# Patient Record
Sex: FEMALE | Race: WHITE | NOT HISPANIC OR LATINO | URBAN - METROPOLITAN AREA
[De-identification: names, ages, dates, MRNs, and addresses within clinical notes are randomized per-mention and may not be internally consistent; named-entity substitution may affect disease eponyms.]

---

## 2017-12-12 ENCOUNTER — TELEPHONE (OUTPATIENT)
Dept: ENDOSCOPY | Facility: HOSPITAL | Age: 72
End: 2017-12-12

## 2017-12-12 ENCOUNTER — TELEPHONE (OUTPATIENT)
Dept: GASTROENTEROLOGY | Facility: CLINIC | Age: 72
End: 2017-12-12

## 2017-12-12 DIAGNOSIS — R93.3 ABNORMAL FINDINGS ON DIAGNOSTIC IMAGING OF DIGESTIVE SYSTEM: Primary | ICD-10-CM

## 2017-12-12 RX ORDER — ALPRAZOLAM 0.5 MG/1
0.5 TABLET ORAL 3 TIMES DAILY PRN
COMMUNITY

## 2017-12-12 RX ORDER — FUROSEMIDE 20 MG/1
20 TABLET ORAL
COMMUNITY

## 2017-12-12 RX ORDER — HYDRALAZINE HYDROCHLORIDE 10 MG/1
10 TABLET, FILM COATED ORAL 3 TIMES DAILY
COMMUNITY

## 2017-12-12 RX ORDER — VERAPAMIL HYDROCHLORIDE 80 MG/1
80 TABLET ORAL DAILY
COMMUNITY

## 2017-12-12 RX ORDER — ASPIRIN 81 MG/1
81 TABLET ORAL DAILY
COMMUNITY

## 2017-12-12 RX ORDER — FLECAINIDE ACETATE 50 MG/1
50 TABLET ORAL 2 TIMES DAILY
COMMUNITY

## 2017-12-12 RX ORDER — CYCLOBENZAPRINE HCL 10 MG
10 TABLET ORAL 3 TIMES DAILY PRN
COMMUNITY

## 2017-12-12 RX ORDER — ESTRADIOL 0.5 MG/1
0.5 TABLET ORAL DAILY
COMMUNITY

## 2017-12-12 RX ORDER — IRBESARTAN 300 MG/1
150 TABLET ORAL 2 TIMES DAILY
COMMUNITY

## 2017-12-12 RX ORDER — MAGNESIUM 250 MG
1 TABLET ORAL 2 TIMES DAILY
COMMUNITY

## 2017-12-12 RX ORDER — OMEPRAZOLE 40 MG/1
40 CAPSULE, DELAYED RELEASE ORAL DAILY
COMMUNITY

## 2017-12-12 NOTE — TELEPHONE ENCOUNTER
Spoke with patient. EUS/ERCP scheduled for 12/14 for 11a. Reviewed prep instructions. Ms Lees verbalized understanding.

## 2017-12-12 NOTE — TELEPHONE ENCOUNTER
Spoke with patient about instructions for UEUS/ERCP scheduled 12/14/17 at 1100.  Instructions emailed.

## 2017-12-14 ENCOUNTER — ANESTHESIA EVENT (OUTPATIENT)
Dept: ENDOSCOPY | Facility: HOSPITAL | Age: 72
End: 2017-12-14
Payer: MEDICARE

## 2017-12-14 ENCOUNTER — HOSPITAL ENCOUNTER (OUTPATIENT)
Facility: HOSPITAL | Age: 72
Discharge: HOME | End: 2017-12-14
Attending: INTERNAL MEDICINE | Admitting: INTERNAL MEDICINE
Payer: MEDICARE

## 2017-12-14 ENCOUNTER — SURGERY (OUTPATIENT)
Age: 72
End: 2017-12-14

## 2017-12-14 ENCOUNTER — ANESTHESIA (OUTPATIENT)
Dept: ENDOSCOPY | Facility: HOSPITAL | Age: 72
End: 2017-12-14
Payer: MEDICARE

## 2017-12-14 VITALS
HEART RATE: 58 BPM | SYSTOLIC BLOOD PRESSURE: 155 MMHG | RESPIRATION RATE: 16 BRPM | TEMPERATURE: 98 F | BODY MASS INDEX: 29.06 KG/M2 | DIASTOLIC BLOOD PRESSURE: 60 MMHG | HEIGHT: 63 IN | OXYGEN SATURATION: 100 % | WEIGHT: 164 LBS

## 2017-12-14 DIAGNOSIS — K83.8 AMPULLA OF VATER MASS: Primary | ICD-10-CM

## 2017-12-14 PROCEDURE — 74328 X-RAY BILE DUCT ENDOSCOPY: CPT | Mod: 26,,, | Performed by: INTERNAL MEDICINE

## 2017-12-14 PROCEDURE — 27201012 HC FORCEPS, HOT/COLD, DISP: Performed by: INTERNAL MEDICINE

## 2017-12-14 PROCEDURE — 27202125 HC BALLOON, EXTRACTION (ANY): Performed by: INTERNAL MEDICINE

## 2017-12-14 PROCEDURE — D9220A PRA ANESTHESIA: Mod: CRNA,,, | Performed by: NURSE ANESTHETIST, CERTIFIED REGISTERED

## 2017-12-14 PROCEDURE — 43259 EGD US EXAM DUODENUM/JEJUNUM: CPT | Performed by: INTERNAL MEDICINE

## 2017-12-14 PROCEDURE — 37000008 HC ANESTHESIA 1ST 15 MINUTES: Performed by: INTERNAL MEDICINE

## 2017-12-14 PROCEDURE — 27201674 HC SPHINCTERTOME: Performed by: INTERNAL MEDICINE

## 2017-12-14 PROCEDURE — 27202304 HC CANNULA, ERCP: Performed by: INTERNAL MEDICINE

## 2017-12-14 PROCEDURE — 25000003 PHARM REV CODE 250: Performed by: NURSE ANESTHETIST, CERTIFIED REGISTERED

## 2017-12-14 PROCEDURE — 37000009 HC ANESTHESIA EA ADD 15 MINS: Performed by: INTERNAL MEDICINE

## 2017-12-14 PROCEDURE — 25000003 PHARM REV CODE 250: Performed by: INTERNAL MEDICINE

## 2017-12-14 PROCEDURE — C1769 GUIDE WIRE: HCPCS | Performed by: INTERNAL MEDICINE

## 2017-12-14 PROCEDURE — D9220A PRA ANESTHESIA: Mod: ANES,,, | Performed by: ANESTHESIOLOGY

## 2017-12-14 PROCEDURE — 43274 ERCP DUCT STENT PLACEMENT: CPT | Mod: ,,, | Performed by: INTERNAL MEDICINE

## 2017-12-14 PROCEDURE — 43274 ERCP DUCT STENT PLACEMENT: CPT | Performed by: INTERNAL MEDICINE

## 2017-12-14 PROCEDURE — 63600175 PHARM REV CODE 636 W HCPCS

## 2017-12-14 PROCEDURE — 63600175 PHARM REV CODE 636 W HCPCS: Performed by: NURSE ANESTHETIST, CERTIFIED REGISTERED

## 2017-12-14 PROCEDURE — 74328 X-RAY BILE DUCT ENDOSCOPY: CPT | Performed by: INTERNAL MEDICINE

## 2017-12-14 PROCEDURE — 43259 EGD US EXAM DUODENUM/JEJUNUM: CPT | Mod: ,,, | Performed by: INTERNAL MEDICINE

## 2017-12-14 PROCEDURE — 88305 TISSUE EXAM BY PATHOLOGIST: CPT | Performed by: PATHOLOGY

## 2017-12-14 PROCEDURE — 43261 ENDO CHOLANGIOPANCREATOGRAPH: CPT | Mod: 51,,, | Performed by: INTERNAL MEDICINE

## 2017-12-14 PROCEDURE — 43261 ENDO CHOLANGIOPANCREATOGRAPH: CPT | Performed by: INTERNAL MEDICINE

## 2017-12-14 RX ORDER — SODIUM CHLORIDE 9 MG/ML
INJECTION, SOLUTION INTRAVENOUS CONTINUOUS
Status: DISCONTINUED | OUTPATIENT
Start: 2017-12-14 | End: 2017-12-14 | Stop reason: HOSPADM

## 2017-12-14 RX ORDER — KETAMINE HCL IN 0.9 % NACL 50 MG/5 ML
SYRINGE (ML) INTRAVENOUS
Status: DISCONTINUED | OUTPATIENT
Start: 2017-12-14 | End: 2017-12-14

## 2017-12-14 RX ORDER — PROPOFOL 10 MG/ML
VIAL (ML) INTRAVENOUS CONTINUOUS PRN
Status: DISCONTINUED | OUTPATIENT
Start: 2017-12-14 | End: 2017-12-14

## 2017-12-14 RX ORDER — HYDRALAZINE HYDROCHLORIDE 20 MG/ML
INJECTION INTRAMUSCULAR; INTRAVENOUS
Status: COMPLETED
Start: 2017-12-14 | End: 2017-12-14

## 2017-12-14 RX ORDER — LIDOCAINE HCL/PF 100 MG/5ML
SYRINGE (ML) INTRAVENOUS
Status: DISCONTINUED | OUTPATIENT
Start: 2017-12-14 | End: 2017-12-14

## 2017-12-14 RX ORDER — SODIUM CHLORIDE 0.9 % (FLUSH) 0.9 %
3 SYRINGE (ML) INJECTION
Status: DISCONTINUED | OUTPATIENT
Start: 2017-12-14 | End: 2017-12-14 | Stop reason: HOSPADM

## 2017-12-14 RX ORDER — HYDRALAZINE HYDROCHLORIDE 20 MG/ML
10 INJECTION INTRAMUSCULAR; INTRAVENOUS ONCE
Status: COMPLETED | OUTPATIENT
Start: 2017-12-14 | End: 2017-12-14

## 2017-12-14 RX ORDER — INDOMETHACIN 50 MG/1
SUPPOSITORY RECTAL
Status: COMPLETED | OUTPATIENT
Start: 2017-12-14 | End: 2017-12-14

## 2017-12-14 RX ORDER — PROPOFOL 10 MG/ML
VIAL (ML) INTRAVENOUS
Status: DISCONTINUED | OUTPATIENT
Start: 2017-12-14 | End: 2017-12-14

## 2017-12-14 RX ADMIN — HYDRALAZINE HYDROCHLORIDE 10 MG: 20 INJECTION INTRAMUSCULAR; INTRAVENOUS at 02:12

## 2017-12-14 RX ADMIN — INDOMETHACIN 100 MG: 50 SUPPOSITORY RECTAL at 01:12

## 2017-12-14 RX ADMIN — PROPOFOL 200 MCG/KG/MIN: 10 INJECTION, EMULSION INTRAVENOUS at 12:12

## 2017-12-14 RX ADMIN — Medication 30 MG: at 12:12

## 2017-12-14 RX ADMIN — PROPOFOL 100 MG: 10 INJECTION, EMULSION INTRAVENOUS at 12:12

## 2017-12-14 RX ADMIN — SODIUM CHLORIDE: 0.9 INJECTION, SOLUTION INTRAVENOUS at 11:12

## 2017-12-14 RX ADMIN — PROPOFOL 300 MCG/KG/MIN: 10 INJECTION, EMULSION INTRAVENOUS at 12:12

## 2017-12-14 RX ADMIN — LIDOCAINE HYDROCHLORIDE 100 MG: 20 INJECTION, SOLUTION INTRAVENOUS at 12:12

## 2017-12-14 RX ADMIN — Medication 20 MG: at 12:12

## 2017-12-14 NOTE — ANESTHESIA PREPROCEDURE EVALUATION
12/14/2017  Vonnie Lees is a 72 y.o., female.    Anesthesia Evaluation    I have reviewed the Patient Summary Reports.     I have reviewed the Medications.     Review of Systems  Anesthesia Hx:  History of prior surgery of interest to airway management or planning:  Denies Personal Hx of Anesthesia complications.   Social:  Non-Smoker, No Alcohol Use    Hematology/Oncology:  Hematology Normal   Oncology Normal     EENT/Dental:EENT/Dental Normal   Cardiovascular:   Hypertension Dysrhythmias (controlled bigeminy)    Pulmonary:  Pulmonary Normal    Renal/:  Renal/ Normal     Hepatic/GI:   Biliary mass - occasional aftn nausea with meals, denies vomiting/fullness   Musculoskeletal:  Musculoskeletal Normal    Neurological:   TIA,    Dermatological:  Skin Normal    Psych:   anxiety          Physical Exam  General:  Well nourished    Airway/Jaw/Neck:  Airway Findings: Mouth Opening: Normal Tongue: Normal  General Airway Assessment: Adult, Good  Mallampati: III  TM Distance: Normal, at least 6 cm     Eyes/Ears/Nose:  EYES/EARS/NOSE FINDINGS: Normal   Dental:  Dental Findings: In tact   Chest/Lungs:  Chest/Lungs Findings: Clear to auscultation, Normal Respiratory Rate     Heart/Vascular:  Heart Findings: Rate: Normal  Rhythm: Regular Rhythm  Sounds: Normal  Heart murmur: negative       Mental Status:  Mental Status Findings:  Cooperative, Alert and Oriented         Anesthesia Plan  Type of Anesthesia, risks & benefits discussed:  Anesthesia Type:  general  Patient's Preference:   Intra-op Monitoring Plan:   Intra-op Monitoring Plan Comments:   Post Op Pain Control Plan:   Post Op Pain Control Plan Comments:   Induction:   IV  Beta Blocker:  Patient is not currently on a Beta-Blocker (No further documentation required).       Informed Consent: Patient understands risks and agrees with Anesthesia plan.   Questions answered. Anesthesia consent signed with patient.  ASA Score: 2     Day of Surgery Review of History & Physical:    H&P update referred to the provider.     Anesthesia Plan Notes:   72F HTN, anxiety, biliary mass for EUS/ERCP/poss stent under GA NC TIVA        Ready For Surgery From Anesthesia Perspective.

## 2017-12-14 NOTE — TRANSFER OF CARE
"Anesthesia Transfer of Care Note    Patient: Vonnie Lees    Procedure(s) Performed: Procedure(s) (LRB):  ULTRASOUND-ENDOSCOPIC-UPPER (N/A)  ERCP (N/A)    Patient location: PACU    Anesthesia Type: general    Transport from OR: Transported from OR on 2-3 L/min O2 by NC with adequate spontaneous ventilation    Post pain: adequate analgesia    Post assessment: no apparent anesthetic complications    Post vital signs: stable    Level of consciousness: sedated    Nausea/Vomiting: no nausea/vomiting    Complications: none    Transfer of care protocol was followed      Last vitals:   Visit Vitals  BP (!) 108/51 (BP Location: Left arm, Patient Position: Lying)   Pulse 61   Temp 36.4 °C (97.5 °F) (Temporal)   Resp 15   Ht 5' 3" (1.6 m)   Wt 74.4 kg (164 lb)   SpO2 97%   Breastfeeding? No   BMI 29.05 kg/m²     "

## 2017-12-14 NOTE — H&P
Short Stay Endoscopy History and Physical    PCP - Brenton Castro MD  Referring Physician - Freddy Turcios MD  [address] [place] [address]    Procedure - EUS/ERCP  ASA - per anesthesia  Mallampati - per anesthesia  History of Anesthesia problems - no  Family history Anesthesia problems -  no   Plan of anesthesia - General    HPI:  This is a 72 y.o. female here for evaluation of: ampullary mass    Reflux - no  Dysphagia - no  Abdominal pain - no  Diarrhea - no    ROS:  Constitutional: No fevers, chills, No weight loss  CV: No chest pain  Pulm: No cough, No shortness of breath  Ophtho: No vision changes  GI: see HPI  Derm: No rash    Medical History:  has a past medical history of Anxiety; Arrhythmia; Biliary obstruction; Elevated liver enzymes; Endometriosis; GERD (gastroesophageal reflux disease); Hepatitis; HTN (hypertension); IBS (irritable bowel syndrome); and TIA (transient ischemic attack).    Surgical History:  has a past surgical history that includes Colonoscopy; Cholecystectomy; Hysterectomy; Tonsillectomy; and Cataract extraction, extracapsular w/ intraocular lens implant (Bilateral).    Family History: family history is not on file..    Social History:  reports that she has never smoked. She has never used smokeless tobacco. She reports that she does not drink alcohol or use drugs.    Review of patient's allergies indicates:   Allergen Reactions    Aldoril d30 [methyldopa-hydrochlorothiazide] Other (See Comments)     Itching eyeball.    Flagyl [metronidazole hcl] Other (See Comments)     Yeast infection.    Hydrochlorothiazide Other (See Comments)     Itching eyeball.    Keflex [cephalexin] Diarrhea    Zithromax [azithromycin] Diarrhea       Medications:   Prescriptions Prior to Admission   Medication Sig Dispense Refill Last Dose    flecainide (TAMBOCOR) 50 MG Tab Take 50 mg by mouth 2 (two) times daily.    12/14/2017 at Unknown time    furosemide (LASIX) 20 MG tablet Take  20 mg by mouth every other day.   Past Month at Unknown time    hydrALAZINE (APRESOLINE) 10 MG tablet Take 10 mg by mouth 3 (three) times daily.   12/14/2017 at Unknown time    irbesartan (AVAPRO) 300 MG tablet Take 150 mg by mouth 2 (two) times daily.    12/13/2017 at Unknown time    magnesium 250 mg Tab Take 1 tablet by mouth 2 (two) times daily.    Past Week at Unknown time    omeprazole (PRILOSEC) 40 MG capsule Take 40 mg by mouth once daily.   Past Week at Unknown time    verapamil (CALAN) 80 MG tablet Take 80 mg by mouth once daily.   12/13/2017 at Unknown time    ALPRAZolam (XANAX) 0.5 MG tablet Take 0.5 mg by mouth 3 (three) times daily as needed for Anxiety.   More than a month at Unknown time    aspirin (ECOTRIN) 81 MG EC tablet Take 81 mg by mouth once daily.   More than a month at Unknown time    cyclobenzaprine (FLEXERIL) 10 MG tablet Take 10 mg by mouth 3 (three) times daily as needed for Muscle spasms.   More than a month at Unknown time    estradiol (ESTRACE) 0.5 MG tablet Take 0.5 mg by mouth once daily.   More than a month at Unknown time       Physical Exam:    Vital Signs:   Vitals:    12/14/17 1113   BP: (!) 168/71   Pulse: 68   Resp: 16   Temp: 98.2 °F (36.8 °C)       General Appearance: Well appearing in no acute distress  Eyes:    No scleral icterus  ENT: Neck supple  Lungs: CTA anteriorly  Heart:  Regular rate  Abdomen: Soft, non tender, non distended with normal bowel sounds.  Extremities: No edema  Skin: No rash    Labs:  No results found for: WBC, HGB, HCT, PLT, CHOL, TRIG, HDL, LDLDIRECT, ALT, AST, NA, K, CL, CREATININE, BUN, CO2, TSH, PSA, INR, GLUF, HGBA1C, MICROALBUR    I have explained the risks and benefits of this endoscopic procedure to the patient including but not limited to bleeding, inflammation, infection, perforation, and death.      Tesfaye Barraza MD

## 2017-12-14 NOTE — PROVATION PATIENT INSTRUCTIONS
Discharge Summary/Instructions after an Endoscopic Procedure  Patient Name: Vonnie Lees  Patient MRN: [id number]  Patient YOB: 1945 Thursday, December 14, 2017  Tesfaye Barraza MD  RESTRICTIONS:  During your procedure today, you received medications for sedation.  These   medications may affect your judgment, balance and coordination.  Therefore,   for 24 hours, you have the following restrictions:   - DO NOT drive a car, operate machinery, make legal/financial decisions,   sign important papers or drink alcohol.    ACTIVITY:  The following day: return to full activity including work, except no heavy   lifting, straining or running for 3 days if polyps were removed.  DIET:  Eat and drink normally unless instructed otherwise.     TREATMENT FOR COMMON SIDE EFFECTS:  - Mild abdominal pain, belching, bloating or excessive gas: rest, eat   lightly and use a heating pad.  - Sore Throat: treat with throat lozenges and/or gargle with warm salt   water.  SYMPTOMS TO WATCH FOR AND REPORT TO YOUR PHYSICIAN:  1. Abdominal pain or bloating, other than gas cramps.  2. Chest pain.  3. Back pain.  4. Chills or fever occurring within 24 hours after the procedure.  5. Rectal bleeding, which would show as bright red, maroon, or black stools.   (A tablespoon of blood from the rectum is not serious, especially if   hemorrhoids are present.)  6. Vomiting.  7. Weakness or dizziness.  8. Because air was used during the procedure, expelling large amounts of air   from your rectum or belching is normal.  9. If a bowel prep was taken, you may not have a bowel movement for 1-3   days.  This is normal.  GO DIRECTLY TO THE NEAREST EMERGENCY ROOM IF YOU HAVE ANY OF THE FOLLOWING:      Difficulty breathing  Chills and/or fever over 101 F   Persistent vomiting and/or vomiting blood   Severe abdominal pain   Severe chest pain   Black, tarry stools   Bleeding- more than one tablespoon   Any other symptom or condition that you may feel  needs urgent attention  Your doctor recommends these additional instructions:  If any biopsies were taken, your doctor s clinic will contact you in 1 to 2   weeks with any results.  You are being discharged to home.   Your physician has recommended an ERCP today.  For questions, problems or results please call your physician - Tesfaye Barraza MD at Work:  [phone].  [facility], EMERGENCY ROOM PHONE NUMBER: [phone]  IF A COMPLICATION OR EMERGENCY SITUATION ARISES AND YOU ARE UNABLE TO REACH   YOUR PHYSICIAN - GO DIRECTLY TO THE EMERGENCY ROOM.  Tesfaye Barraza MD  12/14/2017 1:50:24 PM  This report has been verified and signed electronically.

## 2017-12-14 NOTE — PROVATION PATIENT INSTRUCTIONS
Discharge Summary/Instructions after an Endoscopic Procedure  Patient Name: Vonnie Lees  Patient MRN: [id number]  Patient YOB: 1945 Thursday, December 14, 2017  Tesfaye Barraza MD  RESTRICTIONS:  During your procedure today, you received medications for sedation.  These   medications may affect your judgment, balance and coordination.  Therefore,   for 24 hours, you have the following restrictions:   - DO NOT drive a car, operate machinery, make legal/financial decisions,   sign important papers or drink alcohol.    ACTIVITY:  The following day: return to full activity including work, except no heavy   lifting, straining or running for 3 days if polyps were removed.  DIET:  Eat and drink normally unless instructed otherwise.     TREATMENT FOR COMMON SIDE EFFECTS:  - Mild abdominal pain, belching, bloating or excessive gas: rest, eat   lightly and use a heating pad.  - Sore Throat: treat with throat lozenges and/or gargle with warm salt   water.  SYMPTOMS TO WATCH FOR AND REPORT TO YOUR PHYSICIAN:  1. Abdominal pain or bloating, other than gas cramps.  2. Chest pain.  3. Back pain.  4. Chills or fever occurring within 24 hours after the procedure.  5. Rectal bleeding, which would show as bright red, maroon, or black stools.   (A tablespoon of blood from the rectum is not serious, especially if   hemorrhoids are present.)  6. Vomiting.  7. Weakness or dizziness.  8. Because air was used during the procedure, expelling large amounts of air   from your rectum or belching is normal.  9. If a bowel prep was taken, you may not have a bowel movement for 1-3   days.  This is normal.  GO DIRECTLY TO THE NEAREST EMERGENCY ROOM IF YOU HAVE ANY OF THE FOLLOWING:      Difficulty breathing  Chills and/or fever over 101 F   Persistent vomiting and/or vomiting blood   Severe abdominal pain   Severe chest pain   Black, tarry stools   Bleeding- more than one tablespoon   Any other symptom or condition that you may feel  needs urgent attention  Your doctor recommends these additional instructions:  If any biopsies were taken, your doctor s clinic will contact you in 1 to 2   weeks with any results.  You are being discharged to home.   We are waiting for pathology results.   An appointment has been made (or make an appointment) to see a surgeon at   appointment to be scheduled.   Return to your primary care physician as previously scheduled.  For questions, problems or results please call your physician - Tesfaye Barraza MD at Work:  [phone].  [facility], EMERGENCY ROOM PHONE NUMBER: [phone]  IF A COMPLICATION OR EMERGENCY SITUATION ARISES AND YOU ARE UNABLE TO REACH   YOUR PHYSICIAN - GO DIRECTLY TO THE EMERGENCY ROOM.  Tesfaye Barraza MD  12/14/2017 1:58:52 PM  This report has been verified and signed electronically.

## 2017-12-14 NOTE — PLAN OF CARE
Pt is AAOx4. VSS. NAD. IV discontinued. Discharge instructions given, verbalized  Understanding. Discharged home with family.

## 2017-12-15 NOTE — ANESTHESIA POSTPROCEDURE EVALUATION
"Anesthesia Post Evaluation    Patient: Vonnie Lees    Procedure(s) Performed: Procedure(s) (LRB):  ULTRASOUND-ENDOSCOPIC-UPPER (N/A)  ERCP (N/A)    Final Anesthesia Type: general  Patient location during evaluation: PACU  Patient participation: Yes- Able to Participate  Level of consciousness: awake and alert  Pain management: adequate  Airway patency: patent  PONV status at discharge: No PONV  Anesthetic complications: no      Cardiovascular status: blood pressure returned to baseline  Respiratory status: unassisted, spontaneous ventilation and room air  Hydration status: euvolemic  Follow-up not needed.        Visit Vitals  BP (!) 155/60   Pulse (!) 58   Temp 36.7 °C (98.1 °F) (Temporal)   Resp 16   Ht 5' 3" (1.6 m)   Wt 74.4 kg (164 lb)   SpO2 100%   Breastfeeding? No   BMI 29.05 kg/m²       Pain/Gilda Score: Pain Assessment Performed: Yes (12/14/2017  3:05 PM)  Presence of Pain: denies (12/14/2017  3:05 PM)  Gilda Score: 10 (12/14/2017  3:05 PM)  Modified Gilda Score: 19 (12/14/2017  3:05 PM)      "

## 2017-12-19 ENCOUNTER — TELEPHONE (OUTPATIENT)
Dept: SURGERY | Facility: CLINIC | Age: 72
End: 2017-12-19

## 2017-12-20 ENCOUNTER — TELEPHONE (OUTPATIENT)
Dept: SURGERY | Facility: CLINIC | Age: 72
End: 2017-12-20

## 2017-12-20 DIAGNOSIS — C80.1 ADENOCARCINOMA: Primary | ICD-10-CM

## 2017-12-20 NOTE — TELEPHONE ENCOUNTER
----- Message from Shelia Vivas RN sent at 12/20/2017  1:53 PM CST -----  Contact: self       ----- Message -----  From: Ilene Rod  Sent: 12/20/2017   1:30 PM  To: Eddi [facility] Staff    Vonnie States that she needs a call returned by a nurse in reference to needing an consultation, Please call Vonnie @ [phone] . Thanks :)

## 2017-12-22 ENCOUNTER — HOSPITAL ENCOUNTER (OUTPATIENT)
Dept: RADIOLOGY | Facility: HOSPITAL | Age: 72
Discharge: HOME | End: 2017-12-22
Attending: SURGERY
Payer: MEDICARE

## 2017-12-22 ENCOUNTER — INITIAL CONSULT (OUTPATIENT)
Dept: SURGERY | Facility: CLINIC | Age: 72
End: 2017-12-22
Payer: MEDICARE

## 2017-12-22 VITALS
BODY MASS INDEX: 28.66 KG/M2 | DIASTOLIC BLOOD PRESSURE: 78 MMHG | SYSTOLIC BLOOD PRESSURE: 166 MMHG | TEMPERATURE: 97 F | HEART RATE: 68 BPM | RESPIRATION RATE: 18 BRPM | WEIGHT: 161.81 LBS

## 2017-12-22 DIAGNOSIS — R63.4 WEIGHT LOSS: ICD-10-CM

## 2017-12-22 DIAGNOSIS — C80.1 ADENOCARCINOMA: ICD-10-CM

## 2017-12-22 DIAGNOSIS — K83.8 AMPULLA OF VATER MASS: Primary | ICD-10-CM

## 2017-12-22 DIAGNOSIS — E55.9 VITAMIN D INSUFFICIENCY: ICD-10-CM

## 2017-12-22 DIAGNOSIS — R73.9 HYPERGLYCEMIA: ICD-10-CM

## 2017-12-22 PROCEDURE — 99214 OFFICE O/P EST MOD 30 MIN: CPT | Mod: PBBFAC,25 | Performed by: NURSE PRACTITIONER

## 2017-12-22 PROCEDURE — 71260 CT THORAX DX C+: CPT | Mod: 26,,, | Performed by: RADIOLOGY

## 2017-12-22 PROCEDURE — 99999 PR PBB SHADOW E&M-EST. PATIENT-LVL IV: CPT | Mod: PBBFAC,,, | Performed by: NURSE PRACTITIONER

## 2017-12-22 PROCEDURE — 71260 CT THORAX DX C+: CPT | Mod: TC

## 2017-12-22 PROCEDURE — 99204 OFFICE O/P NEW MOD 45 MIN: CPT | Mod: S$PBB,,, | Performed by: NURSE PRACTITIONER

## 2017-12-22 PROCEDURE — 25500020 PHARM REV CODE 255: Performed by: SURGERY

## 2017-12-22 PROCEDURE — 74177 CT ABD & PELVIS W/CONTRAST: CPT | Mod: TC

## 2017-12-22 PROCEDURE — 74177 CT ABD & PELVIS W/CONTRAST: CPT | Mod: 26,GC,, | Performed by: RADIOLOGY

## 2017-12-22 RX ADMIN — IOHEXOL 75 ML: 350 INJECTION, SOLUTION INTRAVENOUS at 11:12

## 2017-12-22 NOTE — LETTER
December 27, 2017      Tesfaye Barraza MD  [address] [facility] [address]           [facility]  [address]  [place] [address]  Phone: [phone]          Patient: Vonnie Lees   MR Number: [id number]   YOB: 1945   Date of Visit: 12/22/2017       Dear Dr. Tesfaye Barraza:    Thank you for referring Vonnie Lees to Dr. Rangel and our team for evaluation. Attached you will find relevant portions of my assessment and plan of care. She will be undergoing a Whipple procedure on 11/15/18.     If you have questions, please do not hesitate to call me. I look forward to following Vonnie Lees along with you.    Sincerely,    Lety Muhammad, DNP, APRN, A-GNP-C  Nurse Practitioner, Program Development and Navigation,  Surgical Oncology and General Surgery,  [facility] [address] [place] [address]    Tel [phone]  Fax [phone]  Email: [email or web address]  Enclosure  CC:  No Recipients    If you would like to receive this communication electronically, please contact [email or web address] or [phone] to request more information on [employer] access.    For providers and/or their staff who would like to refer a patient to [facility], please contact us through our one-stop-shop provider referral line, [facility], at [phone].    If you feel you have received this communication in error or would no longer like to receive these types of communications, please e-mail [email or web address]

## 2017-12-26 DIAGNOSIS — C80.1 ADENOCARCINOMA: Primary | ICD-10-CM

## 2017-12-26 LAB
CREAT SERPL-MCNC: 0.8 MG/DL (ref 0.5–1.4)
SAMPLE: NORMAL

## 2017-12-29 ENCOUNTER — TELEPHONE (OUTPATIENT)
Dept: SURGERY | Facility: CLINIC | Age: 72
End: 2017-12-29

## 2018-01-10 ENCOUNTER — TELEPHONE (OUTPATIENT)
Dept: SURGERY | Facility: CLINIC | Age: 73
End: 2018-01-10

## 2018-01-11 ENCOUNTER — OFFICE VISIT (OUTPATIENT)
Dept: SURGERY | Facility: CLINIC | Age: 73
DRG: 406 | End: 2018-01-11
Payer: MEDICARE

## 2018-01-11 ENCOUNTER — ANESTHESIA EVENT (OUTPATIENT)
Dept: SURGERY | Facility: HOSPITAL | Age: 73
DRG: 406 | End: 2018-01-11
Payer: MEDICARE

## 2018-01-11 VITALS
BODY MASS INDEX: 28.35 KG/M2 | TEMPERATURE: 98 F | HEART RATE: 59 BPM | DIASTOLIC BLOOD PRESSURE: 74 MMHG | WEIGHT: 160.06 LBS | SYSTOLIC BLOOD PRESSURE: 127 MMHG

## 2018-01-11 DIAGNOSIS — C24.1 AMPULLARY CARCINOMA: Primary | ICD-10-CM

## 2018-01-11 PROCEDURE — 99999 PR PBB SHADOW E&M-EST. PATIENT-LVL III: CPT | Mod: PBBFAC,,, | Performed by: SURGERY

## 2018-01-11 PROCEDURE — 99213 OFFICE O/P EST LOW 20 MIN: CPT | Mod: PBBFAC | Performed by: SURGERY

## 2018-01-11 PROCEDURE — 99213 OFFICE O/P EST LOW 20 MIN: CPT | Mod: 57,S$PBB,, | Performed by: SURGERY

## 2018-01-11 RX ORDER — LIDOCAINE HYDROCHLORIDE 10 MG/ML
1 INJECTION, SOLUTION EPIDURAL; INFILTRATION; INTRACAUDAL; PERINEURAL ONCE
Status: CANCELLED | OUTPATIENT
Start: 2018-01-11 | End: 2018-01-11

## 2018-01-11 RX ORDER — ENOXAPARIN SODIUM 100 MG/ML
40 INJECTION SUBCUTANEOUS EVERY 24 HOURS
Status: CANCELLED | OUTPATIENT
Start: 2018-01-11

## 2018-01-11 RX ORDER — DIPHENHYDRAMINE HCL 25 MG
25 CAPSULE ORAL EVERY 6 HOURS PRN
COMMUNITY

## 2018-01-11 RX ORDER — SODIUM CHLORIDE 9 MG/ML
INJECTION, SOLUTION INTRAVENOUS CONTINUOUS
Status: CANCELLED | OUTPATIENT
Start: 2018-01-11

## 2018-01-11 NOTE — PROGRESS NOTES
History & Physical  Surgery      SUBJECTIVE:     Chief Complaint/Reason for Admission: adenocarcinoma of ampulla     History of Present Illness: Vonnie Lees is a 72 y.o. 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 Patient initially presented in late 2017 for mild RUQ pain and nausea. She also has noticed scleral icterus. She had elevated bilirubin and mildly elevated LFTs and was scheduled for abdominal US on 11/21. She was found to have intrahepatic biliary dilation and CBD dilatation. Since she has had MRI/MRCP on 12/2 revealing intra and extrahepatic biliary dilation to the level of the sphincter of oddi with no obvious masses. She underwent EUS/ERCP on 12/14 which showed dilated bile duct to 14mm, mass at the ampulla (17 mm x10 mm). Had stricture sphincterectomy and covered stent placed in the CBD. She has also had stent placed in duodenum. Since procedure patient reports decreased nausea. She no longer has scleral icterus and itching.   Denies fever, chills, fatigue, recent weight loss, chest pain, shortness of breath, vomiting, jaundice, change in bowel movements, diarrhea or constipation.  Has underwent cardiac clearance.   Had previous laparoscopic cholecystectomy and open hysterectomy with lower transverse incision.     Current Outpatient Prescriptions on File Prior to Visit   Medication Sig    ALPRAZolam (XANAX) 0.5 MG tablet Take 0.5 mg by mouth 3 (three) times daily as needed for Anxiety.    cyclobenzaprine (FLEXERIL) 10 MG tablet Take 10 mg by mouth 3 (three) times daily as needed for Muscle spasms.    flecainide (TAMBOCOR) 50 MG Tab Take 50 mg by mouth 2 (two) times daily.     furosemide (LASIX) 20 MG tablet Take 20 mg by mouth every other day.    hydrALAZINE (APRESOLINE) 10 MG tablet Take 10 mg by mouth 3 (three) times daily.    irbesartan (AVAPRO)  300 MG tablet Take 150 mg by mouth 2 (two) times daily.     lipase-protease-amylase 24,000-76,000-120,000 units (CREON) 24,000-76,000 -120,000 unit capsule Take 2 capsules by mouth 3 (three) times daily with meals. Take 1 capsule w snacks    magnesium 250 mg Tab Take 1 tablet by mouth 2 (two) times daily.     omeprazole (PRILOSEC) 40 MG capsule Take 40 mg by mouth once daily.    verapamil (CALAN) 80 MG tablet Take 80 mg by mouth once daily.    aspirin (ECOTRIN) 81 MG EC tablet Take 81 mg by mouth once daily.    estradiol (ESTRACE) 0.5 MG tablet Take 0.5 mg by mouth once daily.     No current facility-administered medications on file prior to visit.        Review of patient's allergies indicates:   Allergen Reactions    Aldoril d30 [methyldopa-hydrochlorothiazide] Other (See Comments)     Itching eyeball.    Flagyl [metronidazole hcl] Other (See Comments)     Yeast infection.    Hydrochlorothiazide Other (See Comments)     Itching eyeball.    Keflex [cephalexin] Diarrhea    Zithromax [azithromycin] Diarrhea       Past Medical History:   Diagnosis Date    Anxiety     Arrhythmia     paroxysmal venticular bigeminy    Biliary obstruction     with severe ductal dilation    Elevated liver enzymes     Endometriosis     GERD (gastroesophageal reflux disease)     Hepatitis     as a child    HTN (hypertension)     IBS (irritable bowel syndrome)     TIA (transient ischemic attack)      Past Surgical History:   Procedure Laterality Date    CATARACT EXTRACTION EXTRACAPSULAR W/ INTRAOCULAR LENS IMPLANTATION Bilateral     CHOLECYSTECTOMY      COLONOSCOPY      ERCP      HYSTERECTOMY      TONSILLECTOMY       No family history on file.  Social History   Substance Use Topics    Smoking status: Never Smoker    Smokeless tobacco: Never Used    Alcohol use No        Review of Systems  OBJECTIVE:     Vital Signs (Most Recent)  Temp: 97.5 °F (36.4 °C) (01/11/18 0954)  Pulse: (!) 59 (01/11/18 0954)  BP: 127/74  (01/11/18 3917)    Physical Exam     Diagnostic Results:    CT OF THE CHEST, ABDOMEN, AND PELVIS WITH IV CONTRAST: 12/22/18    Comparison: None.    Technique: Noncontrast CT images were initially obtained through the abdomen followed by CT images of the chest, abdomen and pelvis at 5-mm axial increments after the administration of 75 cc of Omnipaque 350 intravenous contrast material but no P.O. contrast material.  Coronal and sagittal reformatting was performed using original data.    Findings:     Soft tissues at the base of the neck are unremarkable.    There is a left-sided aortic arch with 3 branch vessels.  The thoracic aorta maintains normal course and caliber without significant atherosclerotic calcification throughout its course.    The mediastinum contains no abnormally enlarged lymph nodes or mass.      The heart is normal in size and shape and there is no pericardial effusion.      Trachea and bronchi are patent and the lungs are symmetric and well aerated.  Examination of the pulmonary parenchyma demonstrates bandlike opacity in lingula, suggestive of platelike atelectasis.  There is a calcified pulmonary granuloma in the posterior segment of the left upper lobe.  Otherwise, there is no evidence of focal consolidation or pleural effusion.    In this patient with newly diagnosed ampullary adenocarcinoma, status post duodenal stent placement, no definite enhancing soft tissue mass is seen within the duodenum.  There is evidence of pneumobilia without intrahepatic biliary ductal dilatation.  The liver is normal in size and demonstrates homogeneous enhancement.  There are punctate calcifications in the right hepatic lobe, likely related to prior granulomatous disease.  There is a subcentimeter hypodensity in hepatic segment III, too small to characterize.  The spleen is normal in size and demonstrates multiple punctate splenic granulomas.  Incidental note is made of a splenule.  The gallbladder is surgically  absent.  The stomach, pancreas and adrenal glands appear unremarkable.    The kidneys are normal in size and attenuation.  Both kidneys concentrate contrast material satisfactorily.  The ureters appear normal in course and caliber.  The urinary bladder appears unremarkable.  Patient is status post hysterectomy.    The bowel demonstrates no evidence of wall thickening, dilatation or surrounding inflammatory change.      There is no evidence of mesenteric or periaortic adenopathy on this exam.      The aorta is normal in course and caliber without significant atherosclerotic calcification throughout its course or branch vessels.      Osseous structures show no signs of fracture, lytic or blastic lesion.   Impression         In this patient with newly diagnosed ampullary adenocarcinoma, status post duodenal stent placement, no definite enhancing soft tissue mass is seen within the duodenum.    No evidence of metastatic disease in the chest, abdomen or pelvis.    Expected pneumobilia without intra-extrahepatic biliary ductal dilatation.    Subcentimeter hypodensity in the left hepatic lobe, too small to characterize.    Cholecystectomy and and hysterectomy.    Although the patient has a history of malignancy, no measurable lesions per the RECIST criteria are identified.      UPPER EUS  Findings:       Endosonographic Finding :       The esophagus, stomach and duodenum and adjacent structures were        visualized endosonographically.       Endosonographic imaging in the visualized portion of the liver        showed no abnormalities.       There was dilation in the main bile duct which measured up to 14 mm.        The outer margins were irregular. There was sonographic        evidence suggesting invasion into the bile duct (invasion was        directly visualized endosonographically).       There was no sign of significant endosonographic abnormality in the        entire pancreas.  Impression:           - There was  dilation in the entire main bile duct                         which measured up to 14 mm.                        - A mass was found in the ampulla.                        - There was no sign of significant pathology in the                         entire pancreas.                        - No specimens collected.  Recommendation:       - Discharge patient to home (ambulatory).                        - Resume previous diet; Discharge to home                         (ambulatory); Resume outpatient medications                        - Perform an ERCP today.  ERCP  Findings:       A medium-sized infiltrative mass was found at the major papilla. A        wire was passed into the biliary tree. The 3-4-5 taper-tip cannula        was passed over the guidewire and the bile duct was then deeply        cannulated. Contrast was injected. I personally interpreted the bile        duct images. Ductal flow of contrast was adequate. Image quality was        adequate. Contrast extended to the main bile duct. The lower third        of the main bile duct contained a single severe stenosis. An 8 mm        biliary sphincterotomy was made with a monofilament traction        (standard) sphincterotome using ERBE electrocautery. The        sphincterotomy oozed blood. One 10 mm by 4 cm covered metal stent        was placed into the common bile duct. Bile flowed through the stent.        The stent was in good position. The ampullary mass was biopsied with        a cold forceps for histology. The total fluoroscopy exposure time        was 1.5 minutes.  Impression:           - The major papilla appeared to have a mass.                        - A severe biliary stricture was found. The                         stricture was malignant appearing.                        - A biliary sphincterotomy was performed.                        - One covered metal stent was placed into the                         common bile duct.  Recommendation:       -  Discharge patient to home (ambulatory).                        - Resume previous diet; Discharge to home                         (ambulatory); Resume outpatient medications                        - Await path results.                        - Refer to a surgeon at appointment to be                         scheduled. Will arrange for surgical oncology                         referral.                        - Return to primary care physician as previously                         scheduled.  ASSESSMENT/PLAN:     A/P: Vonnie Lees is a 72 y.o. 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 Has been cleared by cardiology.     -OR tomorrow for Whipple   -Consented in office today for procedure and blood transfusion     Agree with note above by Dr. James, pt interviewed, examined, chart, labs, vitals, films reviewed.  I have reviewed and edited the note, the assessment/plan is my own.    Ampullary adenoca, imaging without distant disease.  Went over options (resection, palliative chemoRT) w pt and family.  They want to proceed with resection.  Questions asked/answered.    Risks and benefits of pancreaticoduodenectomy (Whipple) including death, bleeding, infection, scar, pain/numbness, margin positivity, discovery of additional disease, anastomotic leakage/fistula, damage to local structures, need for conversion to open procedure, need for additional procedures based on operative findings and imponderables were all reviewed.  She was given the opportunity to ask questions, which were all addressed.  She voiced understanding and wishes to proceed.      Jeovany Rangel MD, FACS  Surgical Oncology  [facility] [place], [place]  Office: [phone]  Fax: [phone]

## 2018-01-11 NOTE — PRE-PROCEDURE INSTRUCTIONS
Preop instructions: NPO after midnight or 8 hours prior to procedure time, shower instructions, directions, leave all valuables at home, medication instructions for PM prior & am of procedure explained. Patient stated an understanding.  Patient denies any side effects or issues with anesthesia or sedation.  Verified patient received instructions to drink 12 ounces Gatorade 2 hours before surgery time

## 2018-01-12 ENCOUNTER — ANESTHESIA (OUTPATIENT)
Dept: SURGERY | Facility: HOSPITAL | Age: 73
DRG: 406 | End: 2018-01-12
Payer: MEDICARE

## 2018-01-12 ENCOUNTER — HOSPITAL ENCOUNTER (INPATIENT)
Facility: HOSPITAL | Age: 73
LOS: 11 days | Discharge: HOME HEALTH | DRG: 406 | End: 2018-01-23
Attending: SURGERY | Admitting: SURGERY
Payer: MEDICARE

## 2018-01-12 DIAGNOSIS — C24.1 AMPULLARY CARCINOMA: ICD-10-CM

## 2018-01-12 LAB
ABO + RH BLD: NORMAL
ALBUMIN SERPL BCP-MCNC: 2.6 G/DL
ALP SERPL-CCNC: 59 U/L
ALT SERPL W/O P-5'-P-CCNC: 38 U/L
ANION GAP SERPL CALC-SCNC: 9 MMOL/L
AST SERPL-CCNC: 40 U/L
BASOPHILS # BLD AUTO: 0.03 K/UL
BASOPHILS # BLD AUTO: 0.03 K/UL
BASOPHILS NFR BLD: 0.1 %
BASOPHILS NFR BLD: 0.2 %
BILIRUB SERPL-MCNC: 0.9 MG/DL
BLD GP AB SCN CELLS X3 SERPL QL: NORMAL
BUN SERPL-MCNC: 7 MG/DL
CALCIUM SERPL-MCNC: 7.5 MG/DL
CHLORIDE SERPL-SCNC: 109 MMOL/L
CO2 SERPL-SCNC: 19 MMOL/L
CREAT SERPL-MCNC: 0.7 MG/DL
DIFFERENTIAL METHOD: ABNORMAL
DIFFERENTIAL METHOD: ABNORMAL
EOSINOPHIL # BLD AUTO: 0 K/UL
EOSINOPHIL # BLD AUTO: 0 K/UL
EOSINOPHIL NFR BLD: 0 %
EOSINOPHIL NFR BLD: 0 %
ERYTHROCYTE [DISTWIDTH] IN BLOOD BY AUTOMATED COUNT: 13.7 %
ERYTHROCYTE [DISTWIDTH] IN BLOOD BY AUTOMATED COUNT: 13.8 %
EST. GFR  (AFRICAN AMERICAN): >60 ML/MIN/1.73 M^2
EST. GFR  (NON AFRICAN AMERICAN): >60 ML/MIN/1.73 M^2
GLUCOSE SERPL-MCNC: 186 MG/DL
GRAM STN SPEC: NORMAL
GRAM STN SPEC: NORMAL
HCT VFR BLD AUTO: 27.7 %
HCT VFR BLD AUTO: 31.1 %
HGB BLD-MCNC: 10.2 G/DL
HGB BLD-MCNC: 9.4 G/DL
IMM GRANULOCYTES # BLD AUTO: 0.09 K/UL
IMM GRANULOCYTES # BLD AUTO: 0.11 K/UL
IMM GRANULOCYTES NFR BLD AUTO: 0.5 %
IMM GRANULOCYTES NFR BLD AUTO: 0.6 %
LYMPHOCYTES # BLD AUTO: 1.1 K/UL
LYMPHOCYTES # BLD AUTO: 1.1 K/UL
LYMPHOCYTES NFR BLD: 4.9 %
LYMPHOCYTES NFR BLD: 6.8 %
MAGNESIUM SERPL-MCNC: 1.9 MG/DL
MCH RBC QN AUTO: 29.3 PG
MCH RBC QN AUTO: 29.7 PG
MCHC RBC AUTO-ENTMCNC: 32.8 G/DL
MCHC RBC AUTO-ENTMCNC: 33.9 G/DL
MCV RBC AUTO: 87 FL
MCV RBC AUTO: 89 FL
MONOCYTES # BLD AUTO: 0.7 K/UL
MONOCYTES # BLD AUTO: 0.8 K/UL
MONOCYTES NFR BLD: 3.3 %
MONOCYTES NFR BLD: 5 %
NEUTROPHILS # BLD AUTO: 13.7 K/UL
NEUTROPHILS # BLD AUTO: 19.9 K/UL
NEUTROPHILS NFR BLD: 87.4 %
NEUTROPHILS NFR BLD: 91.2 %
NRBC BLD-RTO: 0 /100 WBC
NRBC BLD-RTO: 0 /100 WBC
PHOSPHATE SERPL-MCNC: 3.1 MG/DL
PLATELET # BLD AUTO: 168 K/UL
PLATELET # BLD AUTO: 229 K/UL
PMV BLD AUTO: 11.2 FL
PMV BLD AUTO: 11.4 FL
POTASSIUM SERPL-SCNC: 4 MMOL/L
PROT SERPL-MCNC: 4.2 G/DL
RBC # BLD AUTO: 3.17 M/UL
RBC # BLD AUTO: 3.48 M/UL
SODIUM SERPL-SCNC: 137 MMOL/L
WBC # BLD AUTO: 15.64 K/UL
WBC # BLD AUTO: 21.8 K/UL

## 2018-01-12 PROCEDURE — 47420 INCISION OF BILE DUCT: CPT | Mod: 51,GC,, | Performed by: SURGERY

## 2018-01-12 PROCEDURE — 86920 COMPATIBILITY TEST SPIN: CPT

## 2018-01-12 PROCEDURE — 44015 INSERT NEEDLE CATH BOWEL: CPT | Mod: GC,,, | Performed by: SURGERY

## 2018-01-12 PROCEDURE — 27201037 HC PRESSURE MONITORING SET UP

## 2018-01-12 PROCEDURE — 0FBG0ZZ EXCISION OF PANCREAS, OPEN APPROACH: ICD-10-PCS | Performed by: SURGERY

## 2018-01-12 PROCEDURE — 07BD0ZZ EXCISION OF AORTIC LYMPHATIC, OPEN APPROACH: ICD-10-PCS | Performed by: SURGERY

## 2018-01-12 PROCEDURE — 0DB90ZZ EXCISION OF DUODENUM, OPEN APPROACH: ICD-10-PCS | Performed by: SURGERY

## 2018-01-12 PROCEDURE — C1729 CATH, DRAINAGE: HCPCS | Performed by: SURGERY

## 2018-01-12 PROCEDURE — 87070 CULTURE OTHR SPECIMN AEROBIC: CPT

## 2018-01-12 PROCEDURE — 63600175 PHARM REV CODE 636 W HCPCS: Performed by: ANESTHESIOLOGY

## 2018-01-12 PROCEDURE — 84100 ASSAY OF PHOSPHORUS: CPT

## 2018-01-12 PROCEDURE — 83735 ASSAY OF MAGNESIUM: CPT

## 2018-01-12 PROCEDURE — 87075 CULTR BACTERIA EXCEPT BLOOD: CPT

## 2018-01-12 PROCEDURE — 63600175 PHARM REV CODE 636 W HCPCS: Performed by: SURGERY

## 2018-01-12 PROCEDURE — 88309 TISSUE EXAM BY PATHOLOGIST: CPT | Mod: 26,,, | Performed by: PATHOLOGY

## 2018-01-12 PROCEDURE — 25000003 PHARM REV CODE 250: Performed by: SURGERY

## 2018-01-12 PROCEDURE — 88331 PATH CONSLTJ SURG 1 BLK 1SPC: CPT | Mod: 26,,, | Performed by: PATHOLOGY

## 2018-01-12 PROCEDURE — 87205 SMEAR GRAM STAIN: CPT

## 2018-01-12 PROCEDURE — 27201423 OPTIME MED/SURG SUP & DEVICES STERILE SUPPLY: Performed by: SURGERY

## 2018-01-12 PROCEDURE — 27000221 HC OXYGEN, UP TO 24 HOURS

## 2018-01-12 PROCEDURE — 27201040 HC RC 50 FILTER

## 2018-01-12 PROCEDURE — 88305 TISSUE EXAM BY PATHOLOGIST: CPT | Performed by: PATHOLOGY

## 2018-01-12 PROCEDURE — 71000039 HC RECOVERY, EACH ADD'L HOUR: Performed by: SURGERY

## 2018-01-12 PROCEDURE — 94761 N-INVAS EAR/PLS OXIMETRY MLT: CPT

## 2018-01-12 PROCEDURE — 48150 PARTIAL REMOVAL OF PANCREAS: CPT | Mod: GC,,, | Performed by: SURGERY

## 2018-01-12 PROCEDURE — 86850 RBC ANTIBODY SCREEN: CPT

## 2018-01-12 PROCEDURE — 0DHA0UZ INSERTION OF FEEDING DEVICE INTO JEJUNUM, OPEN APPROACH: ICD-10-PCS | Performed by: SURGERY

## 2018-01-12 PROCEDURE — P9045 ALBUMIN (HUMAN), 5%, 250 ML: HCPCS | Mod: JG | Performed by: ANESTHESIOLOGY

## 2018-01-12 PROCEDURE — 94799 UNLISTED PULMONARY SVC/PX: CPT

## 2018-01-12 PROCEDURE — 0F170ZB BYPASS COMMON HEPATIC DUCT TO SMALL INTESTINE, OPEN APPROACH: ICD-10-PCS | Performed by: SURGERY

## 2018-01-12 PROCEDURE — 37000008 HC ANESTHESIA 1ST 15 MINUTES: Performed by: SURGERY

## 2018-01-12 PROCEDURE — 0F1G0ZB BYPASS PANCREAS TO SMALL INTESTINE, OPEN APPROACH: ICD-10-PCS | Performed by: SURGERY

## 2018-01-12 PROCEDURE — 87077 CULTURE AEROBIC IDENTIFY: CPT

## 2018-01-12 PROCEDURE — 20600001 HC STEP DOWN PRIVATE ROOM

## 2018-01-12 PROCEDURE — 87186 SC STD MICRODIL/AGAR DIL: CPT

## 2018-01-12 PROCEDURE — C9290 INJ, BUPIVACAINE LIPOSOME: HCPCS | Performed by: SURGERY

## 2018-01-12 PROCEDURE — D9220A PRA ANESTHESIA: Mod: CRNA,,, | Performed by: NURSE ANESTHETIST, CERTIFIED REGISTERED

## 2018-01-12 PROCEDURE — 25000003 PHARM REV CODE 250: Performed by: ANESTHESIOLOGY

## 2018-01-12 PROCEDURE — 88305 TISSUE EXAM BY PATHOLOGIST: CPT | Mod: 26,,, | Performed by: PATHOLOGY

## 2018-01-12 PROCEDURE — 85025 COMPLETE CBC W/AUTO DIFF WBC: CPT | Mod: 91

## 2018-01-12 PROCEDURE — 80053 COMPREHEN METABOLIC PANEL: CPT

## 2018-01-12 PROCEDURE — 36000709 HC OR TIME LEV III EA ADD 15 MIN: Performed by: SURGERY

## 2018-01-12 PROCEDURE — 71000033 HC RECOVERY, INTIAL HOUR: Performed by: SURGERY

## 2018-01-12 PROCEDURE — 36000708 HC OR TIME LEV III 1ST 15 MIN: Performed by: SURGERY

## 2018-01-12 PROCEDURE — 87102 FUNGUS ISOLATION CULTURE: CPT

## 2018-01-12 PROCEDURE — 0FB70ZZ EXCISION OF COMMON HEPATIC DUCT, OPEN APPROACH: ICD-10-PCS | Performed by: SURGERY

## 2018-01-12 PROCEDURE — 36620 INSERTION CATHETER ARTERY: CPT | Mod: 59,,, | Performed by: ANESTHESIOLOGY

## 2018-01-12 PROCEDURE — D9220A PRA ANESTHESIA: Mod: ANES,,, | Performed by: ANESTHESIOLOGY

## 2018-01-12 PROCEDURE — 0D160ZA BYPASS STOMACH TO JEJUNUM, OPEN APPROACH: ICD-10-PCS | Performed by: SURGERY

## 2018-01-12 PROCEDURE — 36415 COLL VENOUS BLD VENIPUNCTURE: CPT

## 2018-01-12 PROCEDURE — 37000009 HC ANESTHESIA EA ADD 15 MINS: Performed by: SURGERY

## 2018-01-12 RX ORDER — ONDANSETRON 2 MG/ML
INJECTION INTRAMUSCULAR; INTRAVENOUS
Status: DISCONTINUED | OUTPATIENT
Start: 2018-01-12 | End: 2018-01-12

## 2018-01-12 RX ORDER — IPRATROPIUM BROMIDE AND ALBUTEROL SULFATE 2.5; .5 MG/3ML; MG/3ML
3 SOLUTION RESPIRATORY (INHALATION)
Status: DISPENSED | OUTPATIENT
Start: 2018-01-13 | End: 2018-01-15

## 2018-01-12 RX ORDER — ENOXAPARIN SODIUM 100 MG/ML
40 INJECTION SUBCUTANEOUS EVERY 24 HOURS
Status: DISCONTINUED | OUTPATIENT
Start: 2018-01-12 | End: 2018-01-12

## 2018-01-12 RX ORDER — MIDAZOLAM HYDROCHLORIDE 1 MG/ML
INJECTION, SOLUTION INTRAMUSCULAR; INTRAVENOUS
Status: DISCONTINUED | OUTPATIENT
Start: 2018-01-12 | End: 2018-01-12

## 2018-01-12 RX ORDER — ALBUMIN HUMAN 50 G/1000ML
SOLUTION INTRAVENOUS CONTINUOUS PRN
Status: DISCONTINUED | OUTPATIENT
Start: 2018-01-12 | End: 2018-01-12

## 2018-01-12 RX ORDER — NALOXONE HCL 0.4 MG/ML
0.02 VIAL (ML) INJECTION
Status: DISCONTINUED | OUTPATIENT
Start: 2018-01-12 | End: 2018-01-14

## 2018-01-12 RX ORDER — PANTOPRAZOLE SODIUM 40 MG/10ML
40 INJECTION, POWDER, LYOPHILIZED, FOR SOLUTION INTRAVENOUS DAILY
Status: DISCONTINUED | OUTPATIENT
Start: 2018-01-13 | End: 2018-01-18

## 2018-01-12 RX ORDER — GLYCOPYRROLATE 0.2 MG/ML
INJECTION INTRAMUSCULAR; INTRAVENOUS
Status: DISCONTINUED | OUTPATIENT
Start: 2018-01-12 | End: 2018-01-12

## 2018-01-12 RX ORDER — FENTANYL CITRATE 50 UG/ML
25 INJECTION, SOLUTION INTRAMUSCULAR; INTRAVENOUS EVERY 5 MIN PRN
Status: DISCONTINUED | OUTPATIENT
Start: 2018-01-12 | End: 2018-01-12 | Stop reason: HOSPADM

## 2018-01-12 RX ORDER — LIDOCAINE HYDROCHLORIDE 10 MG/ML
1 INJECTION, SOLUTION EPIDURAL; INFILTRATION; INTRACAUDAL; PERINEURAL ONCE
Status: DISCONTINUED | OUTPATIENT
Start: 2018-01-12 | End: 2018-01-12

## 2018-01-12 RX ORDER — ROCURONIUM BROMIDE 10 MG/ML
INJECTION, SOLUTION INTRAVENOUS
Status: DISCONTINUED | OUTPATIENT
Start: 2018-01-12 | End: 2018-01-12

## 2018-01-12 RX ORDER — NEOSTIGMINE METHYLSULFATE 1 MG/ML
INJECTION, SOLUTION INTRAVENOUS
Status: DISCONTINUED | OUTPATIENT
Start: 2018-01-12 | End: 2018-01-12

## 2018-01-12 RX ORDER — SODIUM CHLORIDE 0.9 % (FLUSH) 0.9 %
3 SYRINGE (ML) INJECTION
Status: DISCONTINUED | OUTPATIENT
Start: 2018-01-12 | End: 2018-01-12

## 2018-01-12 RX ORDER — SODIUM CHLORIDE 9 MG/ML
INJECTION, SOLUTION INTRAVENOUS CONTINUOUS
Status: DISCONTINUED | OUTPATIENT
Start: 2018-01-12 | End: 2018-01-12

## 2018-01-12 RX ORDER — LIDOCAINE HCL/PF 100 MG/5ML
SYRINGE (ML) INTRAVENOUS
Status: DISCONTINUED | OUTPATIENT
Start: 2018-01-12 | End: 2018-01-12

## 2018-01-12 RX ORDER — ACETAMINOPHEN 10 MG/ML
INJECTION, SOLUTION INTRAVENOUS
Status: DISCONTINUED | OUTPATIENT
Start: 2018-01-12 | End: 2018-01-12

## 2018-01-12 RX ORDER — BUPIVACAINE HYDROCHLORIDE 2.5 MG/ML
INJECTION, SOLUTION EPIDURAL; INFILTRATION; INTRACAUDAL
Status: DISCONTINUED | OUTPATIENT
Start: 2018-01-12 | End: 2018-01-12 | Stop reason: HOSPADM

## 2018-01-12 RX ORDER — CIPROFLOXACIN 2 MG/ML
400 INJECTION, SOLUTION INTRAVENOUS
Status: COMPLETED | OUTPATIENT
Start: 2018-01-12 | End: 2018-01-12

## 2018-01-12 RX ORDER — SODIUM CHLORIDE 9 MG/ML
INJECTION, SOLUTION INTRAVENOUS CONTINUOUS
Status: DISCONTINUED | OUTPATIENT
Start: 2018-01-12 | End: 2018-01-14

## 2018-01-12 RX ORDER — HYDROMORPHONE HCL IN 0.9% NACL 6 MG/30 ML
PATIENT CONTROLLED ANALGESIA SYRINGE INTRAVENOUS CONTINUOUS
Status: DISCONTINUED | OUTPATIENT
Start: 2018-01-12 | End: 2018-01-14

## 2018-01-12 RX ORDER — PROPOFOL 10 MG/ML
VIAL (ML) INTRAVENOUS
Status: DISCONTINUED | OUTPATIENT
Start: 2018-01-12 | End: 2018-01-12

## 2018-01-12 RX ORDER — SODIUM CHLORIDE 9 MG/ML
INJECTION, SOLUTION INTRAVENOUS CONTINUOUS PRN
Status: DISCONTINUED | OUTPATIENT
Start: 2018-01-12 | End: 2018-01-12

## 2018-01-12 RX ORDER — PHENYLEPHRINE HYDROCHLORIDE 10 MG/ML
INJECTION INTRAVENOUS
Status: DISCONTINUED | OUTPATIENT
Start: 2018-01-12 | End: 2018-01-12

## 2018-01-12 RX ORDER — FENTANYL CITRATE 50 UG/ML
INJECTION, SOLUTION INTRAMUSCULAR; INTRAVENOUS
Status: DISCONTINUED | OUTPATIENT
Start: 2018-01-12 | End: 2018-01-12

## 2018-01-12 RX ORDER — HYDROMORPHONE HYDROCHLORIDE 2 MG/ML
0.2 INJECTION, SOLUTION INTRAMUSCULAR; INTRAVENOUS; SUBCUTANEOUS EVERY 5 MIN PRN
Status: DISCONTINUED | OUTPATIENT
Start: 2018-01-12 | End: 2018-01-12

## 2018-01-12 RX ORDER — KETAMINE HYDROCHLORIDE 100 MG/ML
INJECTION, SOLUTION INTRAMUSCULAR; INTRAVENOUS
Status: DISCONTINUED | OUTPATIENT
Start: 2018-01-12 | End: 2018-01-12

## 2018-01-12 RX ADMIN — ACETAMINOPHEN 1000 MG: 10 INJECTION, SOLUTION INTRAVENOUS at 12:01

## 2018-01-12 RX ADMIN — PHENYLEPHRINE HYDROCHLORIDE 200 MCG: 10 INJECTION INTRAVENOUS at 07:01

## 2018-01-12 RX ADMIN — ROCURONIUM BROMIDE 20 MG: 10 INJECTION, SOLUTION INTRAVENOUS at 09:01

## 2018-01-12 RX ADMIN — LIDOCAINE HYDROCHLORIDE 60 MG: 20 INJECTION, SOLUTION INTRAVENOUS at 07:01

## 2018-01-12 RX ADMIN — ROCURONIUM BROMIDE 20 MG: 10 INJECTION, SOLUTION INTRAVENOUS at 08:01

## 2018-01-12 RX ADMIN — VASOPRESSIN 2 UNITS: 20 INJECTION INTRAVENOUS at 08:01

## 2018-01-12 RX ADMIN — PROPOFOL 50 MG: 10 INJECTION, EMULSION INTRAVENOUS at 07:01

## 2018-01-12 RX ADMIN — VASOPRESSIN 0.04 UNITS/MIN: 20 INJECTION INTRAVENOUS at 08:01

## 2018-01-12 RX ADMIN — ROCURONIUM BROMIDE 10 MG: 10 INJECTION, SOLUTION INTRAVENOUS at 01:01

## 2018-01-12 RX ADMIN — SODIUM CHLORIDE, SODIUM GLUCONATE, SODIUM ACETATE, POTASSIUM CHLORIDE, MAGNESIUM CHLORIDE, SODIUM PHOSPHATE, DIBASIC, AND POTASSIUM PHOSPHATE: .53; .5; .37; .037; .03; .012; .00082 INJECTION, SOLUTION INTRAVENOUS at 10:01

## 2018-01-12 RX ADMIN — ROCURONIUM BROMIDE 10 MG: 10 INJECTION, SOLUTION INTRAVENOUS at 10:01

## 2018-01-12 RX ADMIN — SODIUM CHLORIDE, SODIUM GLUCONATE, SODIUM ACETATE, POTASSIUM CHLORIDE, MAGNESIUM CHLORIDE, SODIUM PHOSPHATE, DIBASIC, AND POTASSIUM PHOSPHATE: .53; .5; .37; .037; .03; .012; .00082 INJECTION, SOLUTION INTRAVENOUS at 07:01

## 2018-01-12 RX ADMIN — KETAMINE HYDROCHLORIDE 30 MG: 100 INJECTION, SOLUTION, CONCENTRATE INTRAMUSCULAR; INTRAVENOUS at 08:01

## 2018-01-12 RX ADMIN — ROCURONIUM BROMIDE 50 MG: 10 INJECTION, SOLUTION INTRAVENOUS at 07:01

## 2018-01-12 RX ADMIN — PHENYLEPHRINE HYDROCHLORIDE 100 MCG: 10 INJECTION INTRAVENOUS at 12:01

## 2018-01-12 RX ADMIN — ROCURONIUM BROMIDE 10 MG: 10 INJECTION, SOLUTION INTRAVENOUS at 12:01

## 2018-01-12 RX ADMIN — SODIUM CHLORIDE: 0.9 INJECTION, SOLUTION INTRAVENOUS at 03:01

## 2018-01-12 RX ADMIN — NEOSTIGMINE METHYLSULFATE 5 MG: 1 INJECTION INTRAVENOUS at 02:01

## 2018-01-12 RX ADMIN — ENOXAPARIN SODIUM 40 MG: 100 INJECTION SUBCUTANEOUS at 06:01

## 2018-01-12 RX ADMIN — KETAMINE HYDROCHLORIDE 30 MG: 100 INJECTION, SOLUTION, CONCENTRATE INTRAMUSCULAR; INTRAVENOUS at 01:01

## 2018-01-12 RX ADMIN — VANCOMYCIN 1000 MG: 1 INJECTION, SOLUTION INTRAVENOUS at 06:01

## 2018-01-12 RX ADMIN — GLYCOPYRROLATE 0.2 MG: 0.2 INJECTION, SOLUTION INTRAMUSCULAR; INTRAVENOUS at 08:01

## 2018-01-12 RX ADMIN — EPHEDRINE SULFATE 5 MG: 50 INJECTION, SOLUTION INTRAMUSCULAR; INTRAVENOUS; SUBCUTANEOUS at 01:01

## 2018-01-12 RX ADMIN — SODIUM CHLORIDE: 0.9 INJECTION, SOLUTION INTRAVENOUS at 06:01

## 2018-01-12 RX ADMIN — EPHEDRINE SULFATE 10 MG: 50 INJECTION, SOLUTION INTRAMUSCULAR; INTRAVENOUS; SUBCUTANEOUS at 07:01

## 2018-01-12 RX ADMIN — SODIUM CHLORIDE: 0.9 INJECTION, SOLUTION INTRAVENOUS at 02:01

## 2018-01-12 RX ADMIN — FENTANYL CITRATE 100 MCG: 50 INJECTION, SOLUTION INTRAMUSCULAR; INTRAVENOUS at 07:01

## 2018-01-12 RX ADMIN — Medication: at 03:01

## 2018-01-12 RX ADMIN — KETAMINE HYDROCHLORIDE 30 MG: 100 INJECTION, SOLUTION, CONCENTRATE INTRAMUSCULAR; INTRAVENOUS at 12:01

## 2018-01-12 RX ADMIN — ALBUMIN (HUMAN): 12.5 SOLUTION INTRAVENOUS at 08:01

## 2018-01-12 RX ADMIN — EPHEDRINE SULFATE 10 MG: 50 INJECTION, SOLUTION INTRAMUSCULAR; INTRAVENOUS; SUBCUTANEOUS at 08:01

## 2018-01-12 RX ADMIN — ALBUMIN (HUMAN): 12.5 SOLUTION INTRAVENOUS at 09:01

## 2018-01-12 RX ADMIN — CIPROFLOXACIN 400 MG: 2 INJECTION, SOLUTION INTRAVENOUS at 08:01

## 2018-01-12 RX ADMIN — EPHEDRINE SULFATE 10 MG: 50 INJECTION, SOLUTION INTRAMUSCULAR; INTRAVENOUS; SUBCUTANEOUS at 12:01

## 2018-01-12 RX ADMIN — EPHEDRINE SULFATE 10 MG: 50 INJECTION, SOLUTION INTRAMUSCULAR; INTRAVENOUS; SUBCUTANEOUS at 02:01

## 2018-01-12 RX ADMIN — EPHEDRINE SULFATE 10 MG: 50 INJECTION, SOLUTION INTRAMUSCULAR; INTRAVENOUS; SUBCUTANEOUS at 10:01

## 2018-01-12 RX ADMIN — FENTANYL CITRATE 50 MCG: 50 INJECTION, SOLUTION INTRAMUSCULAR; INTRAVENOUS at 02:01

## 2018-01-12 RX ADMIN — KETAMINE HYDROCHLORIDE 30 MG: 100 INJECTION, SOLUTION, CONCENTRATE INTRAMUSCULAR; INTRAVENOUS at 07:01

## 2018-01-12 RX ADMIN — SODIUM CHLORIDE, SODIUM GLUCONATE, SODIUM ACETATE, POTASSIUM CHLORIDE, MAGNESIUM CHLORIDE, SODIUM PHOSPHATE, DIBASIC, AND POTASSIUM PHOSPHATE: .53; .5; .37; .037; .03; .012; .00082 INJECTION, SOLUTION INTRAVENOUS at 01:01

## 2018-01-12 RX ADMIN — PHENYLEPHRINE HYDROCHLORIDE 100 MCG: 10 INJECTION INTRAVENOUS at 02:01

## 2018-01-12 RX ADMIN — ONDANSETRON 4 MG: 2 INJECTION INTRAMUSCULAR; INTRAVENOUS at 02:01

## 2018-01-12 RX ADMIN — ROCURONIUM BROMIDE 20 MG: 10 INJECTION, SOLUTION INTRAVENOUS at 12:01

## 2018-01-12 RX ADMIN — KETAMINE HYDROCHLORIDE 30 MG: 100 INJECTION, SOLUTION, CONCENTRATE INTRAMUSCULAR; INTRAVENOUS at 11:01

## 2018-01-12 RX ADMIN — KETAMINE HYDROCHLORIDE 30 MG: 100 INJECTION, SOLUTION, CONCENTRATE INTRAMUSCULAR; INTRAVENOUS at 10:01

## 2018-01-12 RX ADMIN — FENTANYL CITRATE 50 MCG: 50 INJECTION, SOLUTION INTRAMUSCULAR; INTRAVENOUS at 01:01

## 2018-01-12 RX ADMIN — ROCURONIUM BROMIDE 10 MG: 10 INJECTION, SOLUTION INTRAVENOUS at 11:01

## 2018-01-12 RX ADMIN — PROPOFOL 150 MG: 10 INJECTION, EMULSION INTRAVENOUS at 07:01

## 2018-01-12 RX ADMIN — MIDAZOLAM HYDROCHLORIDE 2 MG: 1 INJECTION, SOLUTION INTRAMUSCULAR; INTRAVENOUS at 06:01

## 2018-01-12 RX ADMIN — KETAMINE HYDROCHLORIDE 20 MG: 100 INJECTION, SOLUTION, CONCENTRATE INTRAMUSCULAR; INTRAVENOUS at 02:01

## 2018-01-12 RX ADMIN — PHENYLEPHRINE HYDROCHLORIDE 200 MCG: 10 INJECTION INTRAVENOUS at 03:01

## 2018-01-12 RX ADMIN — GLYCOPYRROLATE 0.6 MG: 0.2 INJECTION, SOLUTION INTRAMUSCULAR; INTRAVENOUS at 02:01

## 2018-01-12 RX ADMIN — VASOPRESSIN 1 UNITS: 20 INJECTION INTRAVENOUS at 07:01

## 2018-01-12 RX ADMIN — VASOPRESSIN 1 UNITS: 20 INJECTION INTRAVENOUS at 03:01

## 2018-01-12 RX ADMIN — SODIUM CHLORIDE, SODIUM GLUCONATE, SODIUM ACETATE, POTASSIUM CHLORIDE, MAGNESIUM CHLORIDE, SODIUM PHOSPHATE, DIBASIC, AND POTASSIUM PHOSPHATE: .53; .5; .37; .037; .03; .012; .00082 INJECTION, SOLUTION INTRAVENOUS at 09:01

## 2018-01-12 RX ADMIN — CALCIUM CHLORIDE 500 MG: 100 INJECTION, SOLUTION INTRAVENOUS at 11:01

## 2018-01-12 RX ADMIN — CALCIUM CHLORIDE 500 MG: 100 INJECTION, SOLUTION INTRAVENOUS at 10:01

## 2018-01-12 RX ADMIN — SODIUM CHLORIDE: 0.9 INJECTION, SOLUTION INTRAVENOUS at 08:01

## 2018-01-12 NOTE — ANESTHESIA PROCEDURE NOTES
Arterial    Diagnosis: Pancreatic mass    Patient location during procedure: done in OR  Procedure start time: 1/12/2018 7:21 AM  Timeout: 1/12/2018 7:20 AM  Procedure end time: 1/12/2018 7:22 AM  Staffing  Anesthesiologist: RENALDO RODRIGUEZ JR  Resident/CRNA: FRANK LUBIN  Performed: resident/CRNA   Anesthesiologist was present at the time of the procedure.  Preanesthetic Checklist  Completed: patient identified, site marked, surgical consent, pre-op evaluation, timeout performed, IV checked, risks and benefits discussed, monitors and equipment checked and anesthesia consent givenArterial  Skin Prep: chlorhexidine gluconate  Local Infiltration: none  Orientation: left  Location: radial  Catheter Size: 20 G  Catheter placement by Ultrasound guidance. Heme positive aspiration all ports.  Vessel Caliber: medium, patent, compressibility normal  Vascular Doppler:  not done  Needle advanced into vessel with real time Ultrasound guidance.  Guidewire confirmed in vessel.Insertion Attempts: 1  Assessment  Dressing: secured with tape and tegaderm  Patient: Tolerated well

## 2018-01-12 NOTE — PROGRESS NOTES
History & Physical    SUBJECTIVE:     History of Present Illness:    Ms Lees is a 72 y.o. female who is accompanied by her , son and daughter, after being referred to us by Dr. Barraza and Dr. Turcios for surgical evaluation of an ampullary adenocarcinoma. She first reports having elevated LFTS though blood work that was done Nov 10/2017. She had been losing weight unintentionally since sep 2017- a total of 13 lbs. She was then referred to have an abdominal US 11/21/17 and followed up w an MRCP, then ERCP w biopsy, EUS, CT CHEST/ABD/PELV. The results are as follows:    12/2/17 MRCP: intra and extrahepatic dilatation to the duodenal ampulla.     12/11/17 ERCP w biopsy: friable duodenal ampullary mass with significant dilatation of CBD    12/11/17 PATHO: tubulovillous adenoma with HGD and consistent w adenocarcinoma in situ.     12/14/17 EUS: There was dilation in the main bile duct which measured up to 14 mm.A hypoechoic mass was identified endosonographically in the ampulla. The mass measured 17 mm by 10 mm in maximal cross-sectional diameter. The outer margins were irregular. There was sonographic evidence suggesting invasion into the bile duct     12/20/17 CT CHEST/ABD/PELV: In this patient with newly diagnosed ampullary adenocarcinoma, status post duodenal stent placement, no definite enhancing soft tissue mass is seen within the duodenum. No evidence of metastatic disease in the chest, abdomen or pelvis.    We discussed that she has an early stage ampullary adenocarcinoma and that the recommended course of action is a Whipple procedure and possible adjuvant chemo. She is still a strong lady and has preserved much of her overall strength. We discussed preoperative PERT for the short term to improve her disgestive problems and improve fat soluble vitamin deficiencies. She is grateful that we were able to fit her in just before Christmas and we will schedule a resection at first available  opportunity.      Chief Complaint   Patient presents with    Consult     ampullary julia       Review of patient's allergies indicates:   Allergen Reactions    Aldoril d30 [methyldopa-hydrochlorothiazide]      Itching eyeball    Flagyl [metronidazole hcl] Other (See Comments)     Yeast infection.    Hydrochlorothiazide      Itching eyeball.    Keflex [cephalexin] Diarrhea    Zithromax [azithromycin] Diarrhea       Current Outpatient Prescriptions   Medication Sig Dispense Refill    ALPRAZolam (XANAX) 0.5 MG tablet Take 0.5 mg by mouth 3 (three) times daily as needed for Anxiety.      cyclobenzaprine (FLEXERIL) 10 MG tablet Take 10 mg by mouth 3 (three) times daily as needed for Muscle spasms.      flecainide (TAMBOCOR) 50 MG Tab Take 50 mg by mouth 2 (two) times daily.       furosemide (LASIX) 20 MG tablet Take 20 mg by mouth as needed.       hydrALAZINE (APRESOLINE) 10 MG tablet Take 10 mg by mouth 3 (three) times daily.      irbesartan (AVAPRO) 300 MG tablet Take 150 mg by mouth 2 (two) times daily.       magnesium 250 mg Tab Take 1 tablet by mouth 2 (two) times daily.       omeprazole (PRILOSEC) 40 MG capsule Take 40 mg by mouth once daily.      verapamil (CALAN) 80 MG tablet Take 80 mg by mouth once daily. Takes in the afternoon      aspirin (ECOTRIN) 81 MG EC tablet Take 81 mg by mouth once daily.      diphenhydrAMINE (BENADRYL) 25 mg capsule Take 25 mg by mouth every 6 (six) hours as needed for Allergies.      estradiol (ESTRACE) 0.5 MG tablet Take 0.5 mg by mouth once daily.      lipase-protease-amylase 24,000-76,000-120,000 units (CREON) 24,000-76,000 -120,000 unit capsule Take 2 capsules by mouth 3 (three) times daily with meals. Take 1 capsule w snacks 240 capsule 0     No current facility-administered medications for this visit.        Past Medical History:   Diagnosis Date    Anxiety     Arrhythmia     paroxysmal venticular bigeminy    Biliary obstruction     with severe ductal  dilation    Elevated liver enzymes     Endometriosis     GERD (gastroesophageal reflux disease)     Hepatitis     as a child    HTN (hypertension)     IBS (irritable bowel syndrome)     TIA (transient ischemic attack)      Past Surgical History:   Procedure Laterality Date    CATARACT EXTRACTION EXTRACAPSULAR W/ INTRAOCULAR LENS IMPLANTATION Bilateral     CHOLECYSTECTOMY      COLONOSCOPY      ERCP      HYSTERECTOMY      TONSILLECTOMY       Family History   Problem Relation Age of Onset    Lung cancer Father      Social History   Substance Use Topics    Smoking status: Never Smoker    Smokeless tobacco: Never Used    Alcohol use No        Review of Systems:  Review of Systems   Constitutional: Positive for fatigue and unexpected weight change.        13 lbs weight loss, unintentional weight loss since Sept.    HENT: Negative.    Eyes: Negative.    Respiratory: Negative.    Cardiovascular: Negative.    Gastrointestinal: Positive for abdominal pain, diarrhea and nausea. Negative for vomiting.        RUQ pain, occ diarrhea, has hx of irritable bowel syndrome, nausea, digestive complaints   Endocrine: Negative.    Genitourinary: Negative.    Musculoskeletal: Negative.    Skin: Positive for color change.        Scleral icterus   Neurological: Negative.    Hematological: Negative.    Psychiatric/Behavioral: Negative.        OBJECTIVE:     Vital Signs (Most Recent)  Temp: 96.8 °F (36 °C) (12/22/17 1300)  Pulse: 68 (12/22/17 1300)  Resp: 18 (12/22/17 1300)  BP: (!) 166/78 (12/22/17 1300)     73.4 kg (161 lb 13.1 oz)     Physical Exam:  Physical Exam   Constitutional: She is oriented to person, place, and time. She appears well-developed and well-nourished. She is cooperative. She does not appear ill.   Well groomed   HENT:   Head: Normocephalic and atraumatic.   Mouth/Throat: Uvula is midline and oropharynx is clear and moist.   Eyes: Conjunctivae and lids are normal. No scleral icterus.   Neck: Trachea  normal, normal range of motion, full passive range of motion without pain and phonation normal. Neck supple. No thyroid mass and no thyromegaly present.   Cardiovascular: Normal rate, regular rhythm, S1 normal, S2 normal and normal heart sounds.    Pulses:       Radial pulses are 2+ on the right side, and 2+ on the left side.   No edema   Pulmonary/Chest: Effort normal and breath sounds normal.   Abdominal: Soft. Bowel sounds are normal. There is no hepatosplenomegaly. There is tenderness in the right upper quadrant. No hernia.   Musculoskeletal:   ROM WNL, ambulates well   Lymphadenopathy:     She has no cervical adenopathy.        Right: No supraclavicular adenopathy present.        Left: No supraclavicular adenopathy present.   Neurological: She is alert and oriented to person, place, and time. She has normal strength. Coordination and gait normal.   Skin: Skin is warm, dry and intact. Capillary refill takes less than 2 seconds. No rash noted. She is not diaphoretic. No pallor.   Psychiatric: She has a normal mood and affect. Her speech is normal and behavior is normal. Judgment and thought content normal. Cognition and memory are normal.       Laboratory  Ordered and reviewed    Diagnostic Results:  CT scan image and report reviewed today    ASSESSMENT/PLAN:     IMPR: resectable ampullary julia    PLAN:    1. Labs today, review at next visit  2. Cardiac clearance  3. Plan for Whipple procedure  4. RTC for pre-op before resection  5. Note to Dr. Barraza and Dr. Turcios

## 2018-01-12 NOTE — BRIEF OP NOTE
[facility]  Surgery Department  Operative Note    SUMMARY     Date of Procedure: 1/12/2018     Procedure: Procedure(s) (LRB):  WHIPPLE (N/A)  EXPLORATION-COMMON BILE DUCT (N/A)  PLACEMENT-TUBE-JEJUNOSTOMY (N/A)     Surgeon(s) and Role:     * Jeovany Rangel MD - Primary     * Ross Olmedo MD - Resident - Assisting        Pre-Operative Diagnosis: Adenocarcinoma [C80.1]    Post-Operative Diagnosis: Post-Op Diagnosis Codes:     * Adenocarcinoma [C80.1]    Anesthesia: General/MAC      Description of the Findings of the Procedure: whipple procedure, ampulary carcinoma, nilson left in place, jejunostomy placed, joyce reconstruction      Complications: No    Estimated Blood Loss (EBL): 500 mL           Implants: * No implants in log *    Specimens:   Specimen (12h ago through future)    Start     Ordered    01/12/18 1501  Specimen to Pathology - Surgery  Once     Comments:  1) Common hepatic node - Permanent2) Whipple specimen, short stitch marks common bile duct, long stitch marks pancreatic margin, SMV green ink, retroperitoneal margin black ink - Frozen      01/12/18 1501                  Condition: Good    Disposition: PACU - hemodynamically stable.    Attestation: I was present and scrubbed for the entire procedure.

## 2018-01-12 NOTE — INTERVAL H&P NOTE
Day of surgery addendum     Current history and physical on file and completed as below.  There have been no significant clinical changes since the completion of the H&P below.  Patient identified by me and surgical site confirmed by patient and myself.    I have personally discussed the risks of surgery as detailed below, as well as the risks of anesthesia.  Vonnie Lees understands the risks, any and all questions were answered to her satisfaction.     Completed by:  Jeovany Rangel MD  on 1/12/2018 at 6:48 AM

## 2018-01-12 NOTE — H&P (VIEW-ONLY)
History & Physical  Surgery      SUBJECTIVE:     Chief Complaint/Reason for Admission: adenocarcinoma of ampulla     History of Present Illness: Vonnie Lees is a 72 y.o. 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 Patient initially presented in late 2017 for mild RUQ pain and nausea. She also has noticed scleral icterus. She had elevated bilirubin and mildly elevated LFTs and was scheduled for abdominal US on 11/21. She was found to have intrahepatic biliary dilation and CBD dilatation. Since she has had MRI/MRCP on 12/2 revealing intra and extrahepatic biliary dilation to the level of the sphincter of oddi with no obvious masses. She underwent EUS/ERCP on 12/14 which showed dilated bile duct to 14mm, mass at the ampulla (17 mm x10 mm). Had stricture sphincterectomy and covered stent placed in the CBD. She has also had stent placed in duodenum. Since procedure patient reports decreased nausea. She no longer has scleral icterus and itching.   Denies fever, chills, fatigue, recent weight loss, chest pain, shortness of breath, vomiting, jaundice, change in bowel movements, diarrhea or constipation.  Has underwent cardiac clearance.   Had previous laparoscopic cholecystectomy and open hysterectomy with lower transverse incision.     Current Outpatient Prescriptions on File Prior to Visit   Medication Sig    ALPRAZolam (XANAX) 0.5 MG tablet Take 0.5 mg by mouth 3 (three) times daily as needed for Anxiety.    cyclobenzaprine (FLEXERIL) 10 MG tablet Take 10 mg by mouth 3 (three) times daily as needed for Muscle spasms.    flecainide (TAMBOCOR) 50 MG Tab Take 50 mg by mouth 2 (two) times daily.     furosemide (LASIX) 20 MG tablet Take 20 mg by mouth every other day.    hydrALAZINE (APRESOLINE) 10 MG tablet Take 10 mg by mouth 3 (three) times daily.    irbesartan (AVAPRO)  300 MG tablet Take 150 mg by mouth 2 (two) times daily.     lipase-protease-amylase 24,000-76,000-120,000 units (CREON) 24,000-76,000 -120,000 unit capsule Take 2 capsules by mouth 3 (three) times daily with meals. Take 1 capsule w snacks    magnesium 250 mg Tab Take 1 tablet by mouth 2 (two) times daily.     omeprazole (PRILOSEC) 40 MG capsule Take 40 mg by mouth once daily.    verapamil (CALAN) 80 MG tablet Take 80 mg by mouth once daily.    aspirin (ECOTRIN) 81 MG EC tablet Take 81 mg by mouth once daily.    estradiol (ESTRACE) 0.5 MG tablet Take 0.5 mg by mouth once daily.     No current facility-administered medications on file prior to visit.        Review of patient's allergies indicates:   Allergen Reactions    Aldoril d30 [methyldopa-hydrochlorothiazide] Other (See Comments)     Itching eyeball.    Flagyl [metronidazole hcl] Other (See Comments)     Yeast infection.    Hydrochlorothiazide Other (See Comments)     Itching eyeball.    Keflex [cephalexin] Diarrhea    Zithromax [azithromycin] Diarrhea       Past Medical History:   Diagnosis Date    Anxiety     Arrhythmia     paroxysmal venticular bigeminy    Biliary obstruction     with severe ductal dilation    Elevated liver enzymes     Endometriosis     GERD (gastroesophageal reflux disease)     Hepatitis     as a child    HTN (hypertension)     IBS (irritable bowel syndrome)     TIA (transient ischemic attack)      Past Surgical History:   Procedure Laterality Date    CATARACT EXTRACTION EXTRACAPSULAR W/ INTRAOCULAR LENS IMPLANTATION Bilateral     CHOLECYSTECTOMY      COLONOSCOPY      ERCP      HYSTERECTOMY      TONSILLECTOMY       No family history on file.  Social History   Substance Use Topics    Smoking status: Never Smoker    Smokeless tobacco: Never Used    Alcohol use No        Review of Systems  OBJECTIVE:     Vital Signs (Most Recent)  Temp: 97.5 °F (36.4 °C) (01/11/18 0954)  Pulse: (!) 59 (01/11/18 0954)  BP: 127/74  (01/11/18 4177)    Physical Exam     Diagnostic Results:    CT OF THE CHEST, ABDOMEN, AND PELVIS WITH IV CONTRAST: 12/22/18    Comparison: None.    Technique: Noncontrast CT images were initially obtained through the abdomen followed by CT images of the chest, abdomen and pelvis at 5-mm axial increments after the administration of 75 cc of Omnipaque 350 intravenous contrast material but no P.O. contrast material.  Coronal and sagittal reformatting was performed using original data.    Findings:     Soft tissues at the base of the neck are unremarkable.    There is a left-sided aortic arch with 3 branch vessels.  The thoracic aorta maintains normal course and caliber without significant atherosclerotic calcification throughout its course.    The mediastinum contains no abnormally enlarged lymph nodes or mass.      The heart is normal in size and shape and there is no pericardial effusion.      Trachea and bronchi are patent and the lungs are symmetric and well aerated.  Examination of the pulmonary parenchyma demonstrates bandlike opacity in lingula, suggestive of platelike atelectasis.  There is a calcified pulmonary granuloma in the posterior segment of the left upper lobe.  Otherwise, there is no evidence of focal consolidation or pleural effusion.    In this patient with newly diagnosed ampullary adenocarcinoma, status post duodenal stent placement, no definite enhancing soft tissue mass is seen within the duodenum.  There is evidence of pneumobilia without intrahepatic biliary ductal dilatation.  The liver is normal in size and demonstrates homogeneous enhancement.  There are punctate calcifications in the right hepatic lobe, likely related to prior granulomatous disease.  There is a subcentimeter hypodensity in hepatic segment III, too small to characterize.  The spleen is normal in size and demonstrates multiple punctate splenic granulomas.  Incidental note is made of a splenule.  The gallbladder is surgically  absent.  The stomach, pancreas and adrenal glands appear unremarkable.    The kidneys are normal in size and attenuation.  Both kidneys concentrate contrast material satisfactorily.  The ureters appear normal in course and caliber.  The urinary bladder appears unremarkable.  Patient is status post hysterectomy.    The bowel demonstrates no evidence of wall thickening, dilatation or surrounding inflammatory change.      There is no evidence of mesenteric or periaortic adenopathy on this exam.      The aorta is normal in course and caliber without significant atherosclerotic calcification throughout its course or branch vessels.      Osseous structures show no signs of fracture, lytic or blastic lesion.   Impression         In this patient with newly diagnosed ampullary adenocarcinoma, status post duodenal stent placement, no definite enhancing soft tissue mass is seen within the duodenum.    No evidence of metastatic disease in the chest, abdomen or pelvis.    Expected pneumobilia without intra-extrahepatic biliary ductal dilatation.    Subcentimeter hypodensity in the left hepatic lobe, too small to characterize.    Cholecystectomy and and hysterectomy.    Although the patient has a history of malignancy, no measurable lesions per the RECIST criteria are identified.      UPPER EUS  Findings:       Endosonographic Finding :       The esophagus, stomach and duodenum and adjacent structures were        visualized endosonographically.       Endosonographic imaging in the visualized portion of the liver        showed no abnormalities.       There was dilation in the main bile duct which measured up to 14 mm.        The outer margins were irregular. There was sonographic        evidence suggesting invasion into the bile duct (invasion was        directly visualized endosonographically).       There was no sign of significant endosonographic abnormality in the        entire pancreas.  Impression:           - There was  dilation in the entire main bile duct                         which measured up to 14 mm.                        - A mass was found in the ampulla.                        - There was no sign of significant pathology in the                         entire pancreas.                        - No specimens collected.  Recommendation:       - Discharge patient to home (ambulatory).                        - Resume previous diet; Discharge to home                         (ambulatory); Resume outpatient medications                        - Perform an ERCP today.  ERCP  Findings:       A medium-sized infiltrative mass was found at the major papilla. A        wire was passed into the biliary tree. The 3-4-5 taper-tip cannula        was passed over the guidewire and the bile duct was then deeply        cannulated. Contrast was injected. I personally interpreted the bile        duct images. Ductal flow of contrast was adequate. Image quality was        adequate. Contrast extended to the main bile duct. The lower third        of the main bile duct contained a single severe stenosis. An 8 mm        biliary sphincterotomy was made with a monofilament traction        (standard) sphincterotome using ERBE electrocautery. The        sphincterotomy oozed blood. One 10 mm by 4 cm covered metal stent        was placed into the common bile duct. Bile flowed through the stent.        The stent was in good position. The ampullary mass was biopsied with        a cold forceps for histology. The total fluoroscopy exposure time        was 1.5 minutes.  Impression:           - The major papilla appeared to have a mass.                        - A severe biliary stricture was found. The                         stricture was malignant appearing.                        - A biliary sphincterotomy was performed.                        - One covered metal stent was placed into the                         common bile duct.  Recommendation:       -  Discharge patient to home (ambulatory).                        - Resume previous diet; Discharge to home                         (ambulatory); Resume outpatient medications                        - Await path results.                        - Refer to a surgeon at appointment to be                         scheduled. Will arrange for surgical oncology                         referral.                        - Return to primary care physician as previously                         scheduled.  ASSESSMENT/PLAN:     A/P: Vonnie Lees is a 72 y.o. 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 Has been cleared by cardiology.     -OR tomorrow for Whipple   -Consented in office today for procedure and blood transfusion     Agree with note above by Dr. James, pt interviewed, examined, chart, labs, vitals, films reviewed.  I have reviewed and edited the note, the assessment/plan is my own.    Ampullary adenoca, imaging without distant disease.  Went over options (resection, palliative chemoRT) w pt and family.  They want to proceed with resection.  Questions asked/answered.    Risks and benefits of pancreaticoduodenectomy (Whipple) including death, bleeding, infection, scar, pain/numbness, margin positivity, discovery of additional disease, anastomotic leakage/fistula, damage to local structures, need for conversion to open procedure, need for additional procedures based on operative findings and imponderables were all reviewed.  She was given the opportunity to ask questions, which were all addressed.  She voiced understanding and wishes to proceed.      Jeovany Rangel MD, FACS  Surgical Oncology  [facility] [place], [place]  Office: [phone]  Fax: [phone]

## 2018-01-12 NOTE — ANESTHESIA PREPROCEDURE EVALUATION
Past Medical History:   Diagnosis Date    Anxiety     Arrhythmia     paroxysmal venticular bigeminy    Biliary obstruction     with severe ductal dilation    Elevated liver enzymes     Endometriosis     GERD (gastroesophageal reflux disease)     Hepatitis     as a child    HTN (hypertension)     IBS (irritable bowel syndrome)     TIA (transient ischemic attack)      Past Surgical History:   Procedure Laterality Date    CATARACT EXTRACTION EXTRACAPSULAR W/ INTRAOCULAR LENS IMPLANTATION Bilateral     CHOLECYSTECTOMY      COLONOSCOPY      ERCP      HYSTERECTOMY      TONSILLECTOMY       Patient Active Problem List   Diagnosis    Ampulla of Vater mass    Ampullary carcinoma     Please See ROS/PMH and Active Problem List above                                                                                                             01/12/2018  Vonnie Lees is a 72 y.o., female.    Anesthesia Evaluation    I have reviewed the Patient Summary Reports.    I have reviewed the Nursing Notes.   I have reviewed the Medications.     Review of Systems  Anesthesia Hx:  No problems with previous Anesthesia  History of prior surgery of interest to airway management or planning:  Denies Personal Hx of Anesthesia complications.   Social:  Non-Smoker, No Alcohol Use    Hematology/Oncology:  Hematology Normal   Oncology Normal     EENT/Dental:EENT/Dental Normal   Cardiovascular:   Hypertension Dysrhythmias (controlled bigeminy)    Pulmonary:  Pulmonary Normal    Renal/:  Renal/ Normal     Hepatic/GI:   GERD Hepatitis Biliary mass - occasional aftn nausea with meals, denies vomiting/fullness   Musculoskeletal:  Musculoskeletal Normal    Neurological:   TIA,    Dermatological:  Skin Normal    Psych:   anxiety          Physical Exam  General:  Well nourished    Airway/Jaw/Neck:  Airway Findings: Mouth Opening: Normal General Airway Assessment: Adult  Mallampati: II  Improves to II with phonation.  TM Distance:  Normal, at least 6 cm  Jaw/Neck Findings:  Neck ROM: Normal ROM      Dental:  Dental Findings: In tact   Chest/Lungs:  Chest/Lungs Findings: Clear to auscultation, Normal Respiratory Rate     Heart/Vascular:  Heart Findings: Rate: Normal  Rhythm: Regular Rhythm        Mental Status:  Mental Status Findings:  Cooperative, Alert and Oriented         Anesthesia Plan  Type of Anesthesia, risks & benefits discussed:  Anesthesia Type:  general  Patient's Preference:   Intra-op Monitoring Plan:   Intra-op Monitoring Plan Comments:   Post Op Pain Control Plan:   Post Op Pain Control Plan Comments:   Induction:   IV  Beta Blocker:  Patient is not currently on a Beta-Blocker (No further documentation required).       Informed Consent: Patient understands risks and agrees with Anesthesia plan.  Questions answered. Anesthesia consent signed with patient.  ASA Score: 3     Day of Surgery Review of History & Physical:    H&P update referred to the surgeon.         Ready For Surgery From Anesthesia Perspective.

## 2018-01-12 NOTE — OP NOTE
[facility]  Surgery Department  Operative Note       Date of Procedure: 1/12/2018     Surgeon(s) and Role:     * Jeovany Rangel MD - Primary     * Ross Olmedo MD - Resident - Assisting      Pre-Operative Diagnosis: Ampullary Adenocarcinoma    Pre-Operative Variables:  Preoperative diagnosis: Ampullary adenoca  Going into surgery, the lesion was believed to be resectable  Chemotherapy within 90 Days: No  Radiation Therapy within 90 Days: No  Preoperative Obstructive Jaundice: Yes  Preoperative Biliary Stent: Yes, covered metal    Post-Operative Diagnosis:    1. Same    Anesthesia: General ETT    Operative Findings:   1. No evidence of distant intraperitoneal metastases   2. RO or R1 resection based on final pathology  3. Frozen sections of bile duct and pancreatic margins negative  4. Reconstruction via retrocolic joyce limb to PJ and HJ, antecolic gastro-J to proximal jejunum  5. Pancreas remnant: soft, duct: 2 mm  6. PD stent:  No  7. Bile duct:  12 mm, thick-walled, bile: clear    Procedures:  1. Pancreaticoduodenectomy (Whipple Procedure), non-pylorus preserving.  2. Open Common Bile Duct Exploration   3. Feeding Tube Jejunostomy    Estimated Blood Loss (EBL):  500 mL           Indications:  Vonnie Lees presents for the above procedures.  Risks and benefits were reviewed including bleeding, infection, pancreatic/biliary/entric leakage/fistula, damage to local structures, need for additional procedures, death, and imponderables.  She understands and gave informed consent to proceed.    Details: The patient was transported to the operating room and satisfactory anesthesia established. Preoperative antibiotics were administered. The patient was placed in the supine position and extremities were padded and protected throughout. A saini catheter was placed.      The operative field was prepped and draped in sterile fashion. A timeout was performed. A supraumbilical midline incision was  used to gain access to the peritoneal cavity. The round ligament was divided flush with the anterior abdominal wall between ties. The falciform ligament was divided up and over the liver and the round ligament/falciform tucked away for later use as a pedicled flap. The abdomen was explored. There was no evidence of hepatic, visceral, or intraperitoneal metastases. No ascites.  There were some pelvic omental adhesions that had to be released to restore normal anatomy.     The omentum was elevated and  from the transverse colon throughout its length. The lesser sac was entered. There was no evidence of involvement of the lesser sac. The right colon was mobilized medially and the hepatic flexure taken down. A wide Kocher maneuver was performed,  the duodenum from the retroperitoneum and exposing the IVC, left renal vein. The tumor did not appear to grossly involve the superior mesenteric artery. The middle colic vein was identified and traced to its junction with the right gastroepiploic vein and the gastrocolic trunk. The gastrocolic trunk was divided with silk ties, exposing the infrapancreatic SMV.      The antrum of the stomach was divided with a purple-loaded Endo JUAN stapler after ligating the gastric and gastroepiploic vessels at the stomach wall with silk. The right-sided omentum was divided at this same level and submitted with the specimen.     Attention was turned to the jony hepatis. The common hepatic artery lymph node was identified dissected circumferentially with electrocautery and submitted separately. This exposed the common hepatic artery, which was traced to the gastroduodenal artery. The right gastric artery was divided between ties. The gastroduodenal artery was test clamped, without any change in the hepatic artery pulse. The gastroduodenal artery was divided between ties with an additional suture ligature. This exposed the portal vein above the pancreas. The common bile duct  was divided. It was flushed and intrumented to remove debris. The stent was pushed into the specimen. The distal duct was oversewn. Lymph nodes in the hepatoduodenal ligament were dissected with the Ligasure, and swept onto the specimen.  A retropancreatic tunnel was created along the anterior aspect of the portal vein, joining with the previous infrapancreatic dissection.         The proximal jejunum was then divided just distal to the ligament of Treitz with a purple-loaded EndoGIA stapler. The ligament of Treitz was taken down with electrocautery. The Ligasure was used to divide the mesentery of the proximal jejunum and fourth portion of the duodenum, taking care to protect the superior mesenteric artery. The proximal jejunum was then passed under the mesenteric vessels to the patient's right side.     The SMA was identified and dissected away from the specimen from the right side.  The majority of this was done with the Ligasure, taking visible side branches to the specimen between clips or ties as appropriate. The PV/SMV was easily separable from the pancreatic head, leaving the specimen attached only by the neck.  The pancreatic neck was divided with electrocautery over the SMV/PV.     This freed the specimen, which was oriented and inked on the back table by me. Sections were obtained of the common hepatic duct and pancreatic margins. These would both return as nonmalignant.      The small bowel was examined. The defect at the ligament of Treitz was closed. The proximal jejunum was identified and divided 50 cm distal to create a Hernandez limb for the PJ & HJ. This was brought through a defect in the right transverse mesocolon. Reconstruction was performed with a retrocolic end to side, invaginating pancreaticojejunostomy, followed by an end to side hepaticojejunostomy on the same limb.  The other limb was an antecolic side to side stapled gastrojejunostomy between the posterior wall of the distal stomach and the  most proximal jejunum     The jejunal limb was brought through a mesocolic window to the right of the middle colic vessels. The pancreas was examined.  It was soft, the duct was miniscule, <2 mm.  I could barely get a 5Fr infant feeding tube into it and felt we were safest with an invaginating anastomosis.  2-0 prolene transpancreatic mattress stitches were placed between the pancreas and jejunum, forming the posterior layer.   A running 4-0 PDS sutures were used to approximate the pancreatic capsule and incorporating the duct to a small jejunotomy under loupe magnification. The previously placed transpancreatic sutures were used to fold the jejunum over the pancreas, completing the anterior layer.      About 10 cm distal to the pancreatico-jejunostomy, an end to side hepaticojejunostomy was performed with interrupted 5-0 PDS sutures, using the method of Husam and Raiza. There was no visible bile leakage.      An antecolic side to side gastrojejunostomy was performed between the posterior wall of the stomach and the most proximal jejunum, away from the distal gastric staple line using a 60 mm purple loaded Endo JUAN stapler. The anastomosis was examined from within. All limbs were patent. The staple line was secure and hemostatic. The enteroenterostomy was closed in 2 layers using 3-0 PDS suture.      Continuity was reestablished with a jejunojejunostomy between the HJ/PJ and GJ limbs using a 60 mm EndoGIA stapler. The limbs and staple line were examined from within and were in good order. The enteroenterostomy was closed in 2 layers using 3-0 PDS suture.      A feeding tube jejunostomy was brought in through the left upper quadrant, through a purse string of silk suture, into the jejunum distal to the enterostomy. A short Witzel tunnel was created and the jejunum tacked to the abdominal wall at the site of tube exit.      A 19 Irish round Raúl drain was brought in via a right upper quadrant stab incision,  placed behind hepaticojejunostomy and behind the pancreaticojejunostomy. The previously preserved omentum was placed under and around the pancreaticojejunostomy. The previously preserved round ligament and falciform were placed over the gastroduodenal artery stump, protecting and  it from the pancreatic anastomosis.    On release of the retractor we noted some bleeding from the left colon mesentery that required suture ligation.  The colon looked healthy and the spleen was fine.  The abdomen was irrigated throughout and inspected for hemostasis.       A separate sterile setup was used for closure.  Gowns and gloves were changed.  The abdomen was closed over a Homer visceral retractor with loop #1 PDS suture.  Skin was closed with monocryl.      The patient was extubated and taken to the recovery room in stable condition. All needle, lap, and sponge counts were reported as correct. I communicated the intraoperative findings with the family following the procedure.      Implants: * No implants in log *    Specimens:   Specimen (12h ago through future)    Start     Ordered    01/12/18 1501  Specimen to Pathology - Surgery  Once     Comments:  1) Common hepatic node - Permanent2) Whipple specimen, short stitch marks common bile duct, long stitch marks pancreatic margin, SMV green ink, retroperitoneal margin black ink - Frozen      01/12/18 1501                  Condition: Stable    Disposition: PACU - hemodynamically stable.    Attestation: I was present and scrubbed for the key portions of the procedure.

## 2018-01-12 NOTE — TRANSFER OF CARE
"Anesthesia Transfer of Care Note    Patient: Vonnie Lees    Procedure(s) Performed: Procedure(s) (LRB):  WHIPPLE (N/A)  EXPLORATION-COMMON BILE DUCT (N/A)  PLACEMENT-TUBE-JEJUNOSTOMY (N/A)    Patient location: PACU    Anesthesia Type: general    Transport from OR: Transported from OR on 6-10 L/min O2 by face mask with adequate spontaneous ventilation    Post pain: adequate analgesia    Post assessment: no apparent anesthetic complications and tolerated procedure well    Post vital signs: stable    Level of consciousness: awake    Nausea/Vomiting: no nausea/vomiting    Complications: none    Transfer of care protocol was followed      Last vitals:   Visit Vitals  BP (!) 149/69   Pulse 75   Temp 37 °C (98.6 °F) (Oral)   Resp 16   Ht 5' 3" (1.6 m)   Wt 73.5 kg (162 lb)   SpO2 99%   Breastfeeding? No   BMI 28.70 kg/m²     "

## 2018-01-13 LAB
ALBUMIN SERPL BCP-MCNC: 2.6 G/DL
ALP SERPL-CCNC: 56 U/L
ALT SERPL W/O P-5'-P-CCNC: 34 U/L
ANION GAP SERPL CALC-SCNC: 4 MMOL/L
AST SERPL-CCNC: 28 U/L
BACTERIA #/AREA URNS AUTO: ABNORMAL /HPF
BASOPHILS # BLD AUTO: 0.02 K/UL
BASOPHILS NFR BLD: 0.1 %
BILIRUB SERPL-MCNC: 0.5 MG/DL
BILIRUB UR QL STRIP: NEGATIVE
BUN SERPL-MCNC: 11 MG/DL
CA-I BLDV-SCNC: 1.2 MMOL/L
CALCIUM SERPL-MCNC: 7.6 MG/DL
CHLORIDE SERPL-SCNC: 114 MMOL/L
CLARITY UR REFRACT.AUTO: ABNORMAL
CO2 SERPL-SCNC: 20 MMOL/L
COLOR UR AUTO: YELLOW
CREAT SERPL-MCNC: 0.7 MG/DL
DIFFERENTIAL METHOD: ABNORMAL
EOSINOPHIL # BLD AUTO: 0 K/UL
EOSINOPHIL NFR BLD: 0 %
ERYTHROCYTE [DISTWIDTH] IN BLOOD BY AUTOMATED COUNT: 13.9 %
EST. GFR  (AFRICAN AMERICAN): >60 ML/MIN/1.73 M^2
EST. GFR  (NON AFRICAN AMERICAN): >60 ML/MIN/1.73 M^2
GLUCOSE SERPL-MCNC: 139 MG/DL (ref 70–110)
GLUCOSE SERPL-MCNC: 141 MG/DL
GLUCOSE SERPL-MCNC: 174 MG/DL (ref 70–110)
GLUCOSE SERPL-MCNC: 175 MG/DL (ref 70–110)
GLUCOSE SERPL-MCNC: 175 MG/DL (ref 70–110)
GLUCOSE UR QL STRIP: NEGATIVE
HCO3 UR-SCNC: 20.1 MMOL/L (ref 24–28)
HCO3 UR-SCNC: 21 MMOL/L (ref 24–28)
HCO3 UR-SCNC: 21.1 MMOL/L (ref 24–28)
HCO3 UR-SCNC: 21.1 MMOL/L (ref 24–28)
HCT VFR BLD AUTO: 28.7 %
HCT VFR BLD CALC: 22 %PCV (ref 36–54)
HCT VFR BLD CALC: 25 %PCV (ref 36–54)
HCT VFR BLD CALC: 25 %PCV (ref 36–54)
HCT VFR BLD CALC: 28 %PCV (ref 36–54)
HGB BLD-MCNC: 9.6 G/DL
HGB UR QL STRIP: ABNORMAL
HYALINE CASTS UR QL AUTO: 0 /LPF
IMM GRANULOCYTES # BLD AUTO: 0.09 K/UL
IMM GRANULOCYTES NFR BLD AUTO: 0.4 %
KETONES UR QL STRIP: NEGATIVE
LEUKOCYTE ESTERASE UR QL STRIP: NEGATIVE
LYMPHOCYTES # BLD AUTO: 1.6 K/UL
LYMPHOCYTES NFR BLD: 7.5 %
MAGNESIUM SERPL-MCNC: 1.7 MG/DL
MCH RBC QN AUTO: 29.2 PG
MCHC RBC AUTO-ENTMCNC: 33.4 G/DL
MCV RBC AUTO: 87 FL
MICROSCOPIC COMMENT: ABNORMAL
MONOCYTES # BLD AUTO: 1 K/UL
MONOCYTES NFR BLD: 4.8 %
NEUTROPHILS # BLD AUTO: 18.4 K/UL
NEUTROPHILS NFR BLD: 87.2 %
NITRITE UR QL STRIP: NEGATIVE
NRBC BLD-RTO: 0 /100 WBC
PCO2 BLDA: 35.5 MMHG (ref 35–45)
PCO2 BLDA: 36.8 MMHG (ref 35–45)
PCO2 BLDA: 39.8 MMHG (ref 35–45)
PCO2 BLDA: 39.8 MMHG (ref 35–45)
PH SMN: 7.33 [PH] (ref 7.35–7.45)
PH SMN: 7.33 [PH] (ref 7.35–7.45)
PH SMN: 7.34 [PH] (ref 7.35–7.45)
PH SMN: 7.38 [PH] (ref 7.35–7.45)
PH UR STRIP: 5 [PH] (ref 5–8)
PHOSPHATE SERPL-MCNC: 2.5 MG/DL
PLATELET # BLD AUTO: 158 K/UL
PMV BLD AUTO: 11.3 FL
PO2 BLDA: 180 MMHG (ref 80–100)
PO2 BLDA: 189 MMHG (ref 80–100)
PO2 BLDA: 201 MMHG (ref 80–100)
PO2 BLDA: 204 MMHG (ref 80–100)
POC BE: -4 MMOL/L
POC BE: -5 MMOL/L
POC BE: -5 MMOL/L
POC BE: -6 MMOL/L
POC IONIZED CALCIUM: 0.92 MMOL/L (ref 1.06–1.42)
POC IONIZED CALCIUM: 1.08 MMOL/L (ref 1.06–1.42)
POC IONIZED CALCIUM: 1.08 MMOL/L (ref 1.06–1.42)
POC IONIZED CALCIUM: 1.17 MMOL/L (ref 1.06–1.42)
POC SATURATED O2: 100 % (ref 95–100)
POC TCO2: 21 MMOL/L (ref 23–27)
POC TCO2: 22 MMOL/L (ref 23–27)
POTASSIUM BLD-SCNC: 3.1 MMOL/L (ref 3.5–5.1)
POTASSIUM BLD-SCNC: 3.1 MMOL/L (ref 3.5–5.1)
POTASSIUM BLD-SCNC: 3.4 MMOL/L (ref 3.5–5.1)
POTASSIUM BLD-SCNC: 3.5 MMOL/L (ref 3.5–5.1)
POTASSIUM SERPL-SCNC: 4.1 MMOL/L
PROT SERPL-MCNC: 4.6 G/DL
PROT UR QL STRIP: ABNORMAL
RBC # BLD AUTO: 3.29 M/UL
RBC #/AREA URNS AUTO: 28 /HPF (ref 0–4)
SAMPLE: ABNORMAL
SODIUM BLD-SCNC: 138 MMOL/L (ref 136–145)
SODIUM BLD-SCNC: 139 MMOL/L (ref 136–145)
SODIUM BLD-SCNC: 142 MMOL/L (ref 136–145)
SODIUM BLD-SCNC: 143 MMOL/L (ref 136–145)
SODIUM SERPL-SCNC: 138 MMOL/L
SP GR UR STRIP: 1.03 (ref 1–1.03)
URN SPEC COLLECT METH UR: ABNORMAL
UROBILINOGEN UR STRIP-ACNC: NEGATIVE EU/DL
WBC # BLD AUTO: 21.03 K/UL
WBC #/AREA URNS AUTO: 4 /HPF (ref 0–5)

## 2018-01-13 PROCEDURE — C9113 INJ PANTOPRAZOLE SODIUM, VIA: HCPCS | Performed by: SURGERY

## 2018-01-13 PROCEDURE — 97802 MEDICAL NUTRITION INDIV IN: CPT

## 2018-01-13 PROCEDURE — 63600175 PHARM REV CODE 636 W HCPCS: Performed by: SURGERY

## 2018-01-13 PROCEDURE — 97165 OT EVAL LOW COMPLEX 30 MIN: CPT

## 2018-01-13 PROCEDURE — 25000003 PHARM REV CODE 250: Performed by: SURGERY

## 2018-01-13 PROCEDURE — 25000003 PHARM REV CODE 250: Performed by: STUDENT IN AN ORGANIZED HEALTH CARE EDUCATION/TRAINING PROGRAM

## 2018-01-13 PROCEDURE — G8987 SELF CARE CURRENT STATUS: HCPCS | Mod: CL

## 2018-01-13 PROCEDURE — G8988 SELF CARE GOAL STATUS: HCPCS | Mod: CI

## 2018-01-13 PROCEDURE — 20600001 HC STEP DOWN PRIVATE ROOM

## 2018-01-13 PROCEDURE — 97161 PT EVAL LOW COMPLEX 20 MIN: CPT

## 2018-01-13 PROCEDURE — 80053 COMPREHEN METABOLIC PANEL: CPT

## 2018-01-13 PROCEDURE — 97530 THERAPEUTIC ACTIVITIES: CPT

## 2018-01-13 PROCEDURE — 94640 AIRWAY INHALATION TREATMENT: CPT

## 2018-01-13 PROCEDURE — 82330 ASSAY OF CALCIUM: CPT

## 2018-01-13 PROCEDURE — 97535 SELF CARE MNGMENT TRAINING: CPT

## 2018-01-13 PROCEDURE — 85025 COMPLETE CBC W/AUTO DIFF WBC: CPT

## 2018-01-13 PROCEDURE — 63600175 PHARM REV CODE 636 W HCPCS: Performed by: STUDENT IN AN ORGANIZED HEALTH CARE EDUCATION/TRAINING PROGRAM

## 2018-01-13 PROCEDURE — 25000242 PHARM REV CODE 250 ALT 637 W/ HCPCS: Performed by: NURSE PRACTITIONER

## 2018-01-13 PROCEDURE — 84100 ASSAY OF PHOSPHORUS: CPT

## 2018-01-13 PROCEDURE — 81001 URINALYSIS AUTO W/SCOPE: CPT

## 2018-01-13 PROCEDURE — 94761 N-INVAS EAR/PLS OXIMETRY MLT: CPT

## 2018-01-13 PROCEDURE — 83735 ASSAY OF MAGNESIUM: CPT

## 2018-01-13 RX ORDER — HYDRALAZINE HYDROCHLORIDE 20 MG/ML
10 INJECTION INTRAMUSCULAR; INTRAVENOUS EVERY 6 HOURS PRN
Status: DISCONTINUED | OUTPATIENT
Start: 2018-01-13 | End: 2018-01-13

## 2018-01-13 RX ORDER — ENOXAPARIN SODIUM 100 MG/ML
40 INJECTION SUBCUTANEOUS EVERY 24 HOURS
Status: DISCONTINUED | OUTPATIENT
Start: 2018-01-13 | End: 2018-01-23

## 2018-01-13 RX ORDER — DIPHENHYDRAMINE HYDROCHLORIDE 50 MG/ML
25 INJECTION INTRAMUSCULAR; INTRAVENOUS EVERY 6 HOURS PRN
Status: DISCONTINUED | OUTPATIENT
Start: 2018-01-13 | End: 2018-01-23 | Stop reason: HOSPADM

## 2018-01-13 RX ORDER — LABETALOL HYDROCHLORIDE 5 MG/ML
10 INJECTION, SOLUTION INTRAVENOUS EVERY 4 HOURS PRN
Status: DISCONTINUED | OUTPATIENT
Start: 2018-01-13 | End: 2018-01-14

## 2018-01-13 RX ORDER — ACETAMINOPHEN 10 MG/ML
1000 INJECTION, SOLUTION INTRAVENOUS ONCE
Status: COMPLETED | OUTPATIENT
Start: 2018-01-13 | End: 2018-01-13

## 2018-01-13 RX ORDER — ACETAMINOPHEN 10 MG/ML
1000 INJECTION, SOLUTION INTRAVENOUS ONCE
Status: DISCONTINUED | OUTPATIENT
Start: 2018-01-13 | End: 2018-01-13

## 2018-01-13 RX ORDER — ALPRAZOLAM 0.5 MG/1
0.5 TABLET ORAL 3 TIMES DAILY PRN
Status: DISCONTINUED | OUTPATIENT
Start: 2018-01-13 | End: 2018-01-13

## 2018-01-13 RX ADMIN — SODIUM CHLORIDE: 0.9 INJECTION, SOLUTION INTRAVENOUS at 03:01

## 2018-01-13 RX ADMIN — Medication: at 10:01

## 2018-01-13 RX ADMIN — PANTOPRAZOLE SODIUM 40 MG: 40 INJECTION, POWDER, FOR SOLUTION INTRAVENOUS at 08:01

## 2018-01-13 RX ADMIN — IPRATROPIUM BROMIDE AND ALBUTEROL SULFATE 3 ML: .5; 3 SOLUTION RESPIRATORY (INHALATION) at 07:01

## 2018-01-13 RX ADMIN — ACETAMINOPHEN 1000 MG: 10 INJECTION, SOLUTION INTRAVENOUS at 10:01

## 2018-01-13 RX ADMIN — SODIUM CHLORIDE 1000 ML: 0.9 INJECTION, SOLUTION INTRAVENOUS at 06:01

## 2018-01-13 RX ADMIN — POTASSIUM PHOSPHATE, MONOBASIC AND POTASSIUM PHOSPHATE, DIBASIC 30 MMOL: 224; 236 INJECTION, SOLUTION INTRAVENOUS at 10:01

## 2018-01-13 RX ADMIN — ENOXAPARIN SODIUM 40 MG: 100 INJECTION SUBCUTANEOUS at 05:01

## 2018-01-13 RX ADMIN — HYDRALAZINE HYDROCHLORIDE 10 MG: 20 INJECTION INTRAMUSCULAR; INTRAVENOUS at 09:01

## 2018-01-13 RX ADMIN — PIPERACILLIN SODIUM AND TAZOBACTAM SODIUM 4.5 G: 4; .5 INJECTION, POWDER, LYOPHILIZED, FOR SOLUTION INTRAVENOUS at 09:01

## 2018-01-13 RX ADMIN — PIPERACILLIN SODIUM AND TAZOBACTAM SODIUM 4.5 G: 4; .5 INJECTION, POWDER, LYOPHILIZED, FOR SOLUTION INTRAVENOUS at 12:01

## 2018-01-13 RX ADMIN — DIPHENHYDRAMINE HYDROCHLORIDE 25 MG: 50 INJECTION, SOLUTION INTRAMUSCULAR; INTRAVENOUS at 10:01

## 2018-01-13 RX ADMIN — ALPRAZOLAM 0.5 MG: 0.5 TABLET ORAL at 03:01

## 2018-01-13 RX ADMIN — SODIUM CHLORIDE 1000 ML: 0.9 INJECTION, SOLUTION INTRAVENOUS at 09:01

## 2018-01-13 RX ADMIN — SODIUM CHLORIDE 1000 ML: 0.9 INJECTION, SOLUTION INTRAVENOUS at 02:01

## 2018-01-13 NOTE — ANESTHESIA POSTPROCEDURE EVALUATION
"Anesthesia Post Evaluation    Patient: Vonnie Lees    Procedure(s) Performed: Procedure(s) (LRB):  WHIPPLE (N/A)  EXPLORATION-COMMON BILE DUCT (N/A)  PLACEMENT-TUBE-JEJUNOSTOMY (N/A)    Final Anesthesia Type: general  Patient location during evaluation: PACU  Patient participation: Yes- Able to Participate  Level of consciousness: awake and alert and oriented  Post-procedure vital signs: reviewed and stable  Pain management: adequate  Airway patency: patent  PONV status at discharge: No PONV  Anesthetic complications: no      Cardiovascular status: blood pressure returned to baseline  Respiratory status: unassisted and spontaneous ventilation  Hydration status: euvolemic  Follow-up not needed.        Visit Vitals  /66   Pulse 78   Temp 36.5 °C (97.7 °F) (Oral)   Resp 17   Ht 5' 3" (1.6 m)   Wt 73.5 kg (162 lb)   SpO2 99%   Breastfeeding? No   BMI 28.70 kg/m²       Pain/Gilda Score: Pain Assessment Performed: Yes (1/12/2018  8:00 PM)  Presence of Pain: complains of pain/discomfort (1/12/2018  8:00 PM)  Pain Rating Prior to Med Admin: 0 (1/12/2018  3:53 PM)  Gilda Score: 9 (1/12/2018  8:00 PM)      "

## 2018-01-13 NOTE — PROGRESS NOTES
[facility]  General Surgery  Progress Note    Subjective:     History of Present Illness:  No notes on file    Post-Op Info:  Procedure(s) (LRB):  WHIPPLE (N/A)  EXPLORATION-COMMON BILE DUCT (N/A)  PLACEMENT-TUBE-JEJUNOSTOMY (N/A)   1 Day Post-Op     Interval History: POD #1 whipple, common bile duct exploration and j-tube placement.   Had oozing around her Jtube site. Site was c/d/i when examined on rounds this morning. Also reported mild nausea overnight controlled with medication.   Good UOP with saini in place.   NPO  Pain controlled on PCA.  Medications:  Continuous Infusions:   sodium chloride 0.9% 125 mL/hr at 01/12/18 1553    hydromorphone in 0.9 % NaCl 6 mg/30 ml       Scheduled Meds:   albuterol-ipratropium 2.5mg-0.5mg/3mL  3 mL Nebulization Q6H WAKE    enoxaparin  40 mg Subcutaneous Daily    pantoprazole  40 mg Intravenous Daily    potassium phosphate IVPB  30 mmol Intravenous Once    sodium chloride 0.9%  1,000 mL Intravenous Once     PRN Meds:naloxone     Review of patient's allergies indicates:   Allergen Reactions    Aldoril d30 [methyldopa-hydrochlorothiazide]      Itching eyeball    Flagyl [metronidazole hcl] Other (See Comments)     Yeast infection.    Hydrochlorothiazide      Itching eyeball.    Keflex [cephalexin] Diarrhea    Zithromax [azithromycin] Diarrhea     Objective:     Vital Signs (Most Recent):  Temp: 97.9 °F (36.6 °C) (01/13/18 0739)  Pulse: 78 (01/13/18 0739)  Resp: 15 (01/13/18 0739)  BP: 132/63 (01/13/18 0739)  SpO2: 96 % (01/13/18 0739) Vital Signs (24h Range):  Temp:  [97.3 °F (36.3 °C)-97.9 °F (36.6 °C)] 97.9 °F (36.6 °C)  Pulse:  [50-81] 78  Resp:  [10-21] 15  SpO2:  [96 %-100 %] 96 %  BP: ()/(54-97) 132/63  Arterial Line BP: ()/(50-93) 138/67     Weight: 73.5 kg (162 lb)  Body mass index is 28.7 kg/m².    Intake/Output - Last 3 Shifts       01/11 0700 - 01/12 0659 01/12 0700 - 01/13 0659 01/13 0700 - 01/14 0659    P.O.  0     I.V.  (mL/kg)  9103.2 (123.9)     Total Intake(mL/kg)  9103.2 (123.9)     Urine (mL/kg/hr)  3325 (1.9)     Emesis/NG output  0 (0)     Drains  225 (0.1)     Stool  0 (0)     Blood  500 (0.3)     Total Output   4050      Net   +5053.2             Stool Occurrence  0 x           Physical Exam   Constitutional: She is oriented to person, place, and time. She appears well-developed and well-nourished. No distress.   HENT:   Head: Normocephalic and atraumatic.   Eyes: EOM are normal. Pupils are equal, round, and reactive to light.   Neck: Normal range of motion. Neck supple.   Cardiovascular: Normal rate, regular rhythm and intact distal pulses.    Pulmonary/Chest: Effort normal. No respiratory distress.   Abdominal: Soft. She exhibits no distension. There is tenderness (at incision site).   Midline incision with bandage in place- c/d/i. Jtube in place with no bleeding or oozing from around site. JANET in place.    Genitourinary:   Genitourinary Comments: Pierre in place   Musculoskeletal: Normal range of motion.   Neurological: She is alert and oriented to person, place, and time.   Skin: Skin is warm and dry. She is not diaphoretic.     Significant Labs:  CBC:   Recent Labs  Lab 01/13/18 0430   WBC 21.03*   RBC 3.29*   HGB 9.6*   HCT 28.7*      MCV 87   MCH 29.2   MCHC 33.4     BMP:   Recent Labs  Lab 01/13/18  0430   *      K 4.1   *   CO2 20*   BUN 11   CREATININE 0.7   CALCIUM 7.6*   MG 1.7     CMP:   Recent Labs  Lab 01/13/18  0430   *   CALCIUM 7.6*   ALBUMIN 2.6*   PROT 4.6*      K 4.1   CO2 20*   *   BUN 11   CREATININE 0.7   ALKPHOS 56   ALT 34   AST 28   BILITOT 0.5     LFTs:   Recent Labs  Lab 01/13/18  0430   ALT 34   AST 28   ALKPHOS 56   BILITOT 0.5   PROT 4.6*   ALBUMIN 2.6*     Coagulation: No results for input(s): LABPROT, INR, APTT in the last 168 hours.      Assessment/Plan:     Ampullary carcinoma    71 yo male s/p whipple, common bile duct exploration and j-tube  placement. On pathway.     -daily labs.   -replete lytes as needed  -pulm toilet- IS, duonebs   -PT/OT. Encourage ambulation and OOBTC today   -decreased UOP overnight- 1L bolus going. Will keep saini in place. Strict I/Os   -mIVF @ 80cc/hr   -NPO  -Nutrition consult for post operative whipple TF   -PCA pain control  -dvt ppx             Niki James MD  General Surgery  [facility]

## 2018-01-13 NOTE — PLAN OF CARE
Problem: Physical Therapy Goal  Goal: Physical Therapy Goal  Goals to be met by: 2018     Patient will increase functional independence with mobility by performin. Supine to sit with Modified [place]  2. Rolling to Right with Modified [place].  3. Sit to stand transfer with Supervision  4. Gait  x 50 feet with Supervision    Outcome: Ongoing (interventions implemented as appropriate)  Initial evaluation completed and POC established    Ryan Holguin DPT  2018

## 2018-01-13 NOTE — NURSING TRANSFER
Nursing Transfer Note      1/12/2018     Transfer to 603    Transfer via stretcher    Transfer with portable tele/iv pole    Transported by DAVID Espinal, ALICIA    Medicines sent: Hydromorphone pca    Chart send with patient: yes    Notified:daughter    Patient reassessed at: 1/12/18 @ 2885

## 2018-01-13 NOTE — SUBJECTIVE & OBJECTIVE
Interval History: POD #1 whipple, common bile duct exploration and j-tube placement.   Had oozing around her Jtube site. Site was c/d/i when examined on rounds this morning. Also reported mild nausea overnight controlled with medication.   Good UOP with saini in place.   NPO  Pain controlled on PCA.  Medications:  Continuous Infusions:   sodium chloride 0.9% 125 mL/hr at 01/12/18 1553    hydromorphone in 0.9 % NaCl 6 mg/30 ml       Scheduled Meds:   albuterol-ipratropium 2.5mg-0.5mg/3mL  3 mL Nebulization Q6H WAKE    enoxaparin  40 mg Subcutaneous Daily    pantoprazole  40 mg Intravenous Daily    potassium phosphate IVPB  30 mmol Intravenous Once    sodium chloride 0.9%  1,000 mL Intravenous Once     PRN Meds:naloxone     Review of patient's allergies indicates:   Allergen Reactions    Aldoril d30 [methyldopa-hydrochlorothiazide]      Itching eyeball    Flagyl [metronidazole hcl] Other (See Comments)     Yeast infection.    Hydrochlorothiazide      Itching eyeball.    Keflex [cephalexin] Diarrhea    Zithromax [azithromycin] Diarrhea     Objective:     Vital Signs (Most Recent):  Temp: 97.9 °F (36.6 °C) (01/13/18 0739)  Pulse: 78 (01/13/18 0739)  Resp: 15 (01/13/18 0739)  BP: 132/63 (01/13/18 0739)  SpO2: 96 % (01/13/18 0739) Vital Signs (24h Range):  Temp:  [97.3 °F (36.3 °C)-97.9 °F (36.6 °C)] 97.9 °F (36.6 °C)  Pulse:  [50-81] 78  Resp:  [10-21] 15  SpO2:  [96 %-100 %] 96 %  BP: ()/(54-97) 132/63  Arterial Line BP: ()/(50-93) 138/67     Weight: 73.5 kg (162 lb)  Body mass index is 28.7 kg/m².    Intake/Output - Last 3 Shifts       01/11 0700 - 01/12 0659 01/12 0700 - 01/13 0659 01/13 0700 - 01/14 0659    P.O.  0     I.V. (mL/kg)  9103.2 (123.9)     Total Intake(mL/kg)  9103.2 (123.9)     Urine (mL/kg/hr)  3325 (1.9)     Emesis/NG output  0 (0)     Drains  225 (0.1)     Stool  0 (0)     Blood  500 (0.3)     Total Output   4050      Net   +5053.2             Stool Occurrence  0 x            Physical Exam   Constitutional: She is oriented to person, place, and time. She appears well-developed and well-nourished. No distress.   HENT:   Head: Normocephalic and atraumatic.   Eyes: EOM are normal. Pupils are equal, round, and reactive to light.   Neck: Normal range of motion. Neck supple.   Cardiovascular: Normal rate, regular rhythm and intact distal pulses.    Pulmonary/Chest: Effort normal. No respiratory distress.   Abdominal: Soft. She exhibits no distension. There is tenderness (at incision site).   Midline incision with bandage in place- c/d/i. Jtube in place with no bleeding or oozing from around site. JANET in place.    Genitourinary:   Genitourinary Comments: Pierre in place   Musculoskeletal: Normal range of motion.   Neurological: She is alert and oriented to person, place, and time.   Skin: Skin is warm and dry. She is not diaphoretic.     Significant Labs:  CBC:   Recent Labs  Lab 01/13/18 0430   WBC 21.03*   RBC 3.29*   HGB 9.6*   HCT 28.7*      MCV 87   MCH 29.2   MCHC 33.4     BMP:   Recent Labs  Lab 01/13/18  0430   *      K 4.1   *   CO2 20*   BUN 11   CREATININE 0.7   CALCIUM 7.6*   MG 1.7     CMP:   Recent Labs  Lab 01/13/18  0430   *   CALCIUM 7.6*   ALBUMIN 2.6*   PROT 4.6*      K 4.1   CO2 20*   *   BUN 11   CREATININE 0.7   ALKPHOS 56   ALT 34   AST 28   BILITOT 0.5     LFTs:   Recent Labs  Lab 01/13/18  0430   ALT 34   AST 28   ALKPHOS 56   BILITOT 0.5   PROT 4.6*   ALBUMIN 2.6*     Coagulation: No results for input(s): LABPROT, INR, APTT in the last 168 hours.

## 2018-01-13 NOTE — PLAN OF CARE
Problem: Patient Care Overview  Goal: Plan of Care Review  Outcome: Ongoing (interventions implemented as appropriate)  Vital signs stable. Afebrile. Drowsy, oriented and following commands. Pain controlled with PCA. JANET and saini remain intact and draining. J-tube clamped. Scant dressing to midline abdominal incision.  Family at bedside multiple times throughout shift and updated regarding POC. Will continue to monitor.

## 2018-01-13 NOTE — PT/OT/SLP EVAL
"Physical Therapy Evaluation    Patient Name:  Vonnie Lees   MRN:  [id number]    Recommendations:     Discharge Recommendations:  home with home health   Discharge Equipment Recommendations: walker, rolling   Barriers to discharge: None    Assessment:     Vonnie Lees is a 72 y.o. female admitted with a medical diagnosis of <principal problem not specified>.  She presents with the following impairments/functional limitations:  weakness, impaired functional mobilty, gait instability, impaired endurance, impaired balance, impaired self care skills, decreased lower extremity function, pain.  Pt demonstrates decreased functional mobility and tolerance for activity secondary to fatigue, weakness and pain.  Pt performed all bed mobility c min A, transfer c min A, and gait c CGA no AD.  Pt able to take 3 side steps towards HOB c no AD, CGA.  Pt would benefit from continued skilled acute PT 4x/wk while admitted to improve functional mobility.  Pt would be able to go home c home health upon d/c from hospital once medically cleared.    Rehab Prognosis:  good; patient would benefit from acute skilled PT services to address these deficits and reach maximum level of function.      Recent Surgery: Procedure(s) (LRB):  WHIPPLE (N/A)  EXPLORATION-COMMON BILE DUCT (N/A)  PLACEMENT-TUBE-JEJUNOSTOMY (N/A) 1 Day Post-Op    Plan:     During this hospitalization, patient to be seen 4 x/week to address the above listed problems via gait training, therapeutic activities, therapeutic exercises, neuromuscular re-education  · Plan of Care Expires:  02/13/18   Plan of Care Reviewed with: patient    Subjective     Communicated with nursing and OT prior to session.  Patient found supine and  in room upon PT entry to room, agreeable to evaluation.      Chief Complaint: decreased functional mobility  Patient comments/goals: "i'm dizzy"  Pain/Comfort:  · Pain Rating 1: 0/10  · Location - Side 1: Bilateral  · Location - Orientation 1: " generalized  · Location 1: abdomen  · Pain Addressed 1: Reposition, Distraction  · Pain Rating Post-Intervention 1: 5/10    Patients cultural, spiritual, Temple conflicts given the current situation: none stated    Living Environment:  Pt lives c  in [facility] c 2STE at back door.    Prior to admission, patients level of function was independent c all functional mobility and ADLs including driving.  Patient has the following equipment: none.  DME owned (not currently used): none.  Upon discharge, patient will have assistance from  as needed.    Objective:     Patient found with: peripheral IV, JAENT drain     General Precautions: Standard,     Orthopedic Precautions:N/A   Braces: N/A     Exams:  · Cognitive Exam:  Patient is oriented to Person, Place, Time and Situation and follows 100% of all commands   · Gross Motor Coordination:  WFL  · Sensation:    · -       Intact  · RLE ROM: WFL  · RLE Strength: 3/5 - not formally tested d/t pt's incision and increase in pain  · LLE ROM: WFL  · LLE Strength: 3/5 - not formally tested d/t pt's incision and increase in pain    Functional Mobility:  · Bed Mobility:     · Rolling Right: minimum assistance  · Scooting: minimum assistance  · Supine to Sit: minimum assistance  · Sit to Supine: minimum assistance  · Transfers:     · Sit to Stand:  minimum assistance with no AD  · Gait: 3 side steps R towards HOB no AD, CGA  · Balance: Good static/dynamic sitting (S); Good static standing (S); Fair-Good dynamic standing (CGA)    AM-PAC 6 CLICK MOBILITY  Total Score:17       Therapeutic Activities and Exercises:   Educated pt on effects of prolonged bedrest, benefits of activity, POC, BLE exercises to perform while in bed and up in chair (AP, LAQ, Hip Flex)  Sat EOB 10 min S; performed functional reach 2x5 BUE in all directions    Patient left HOB elevated with all lines intact, call button in reach, RN notified and  present.    GOALS:    Physical Therapy Goals         Problem: Physical Therapy Goal    Goal Priority Disciplines Outcome Goal Variances Interventions   Physical Therapy Goal     PT/OT, PT Ongoing (interventions implemented as appropriate)     Description:  Goals to be met by: 2018     Patient will increase functional independence with mobility by performin. Supine to sit with Modified [place]  2. Rolling to Right with Modified [place].  3. Sit to stand transfer with Supervision  4. Gait  x 50 feet with Supervision                      History:     Past Medical History:   Diagnosis Date    Anxiety     Arrhythmia     paroxysmal venticular bigeminy    Biliary obstruction     with severe ductal dilation    Elevated liver enzymes     Endometriosis     GERD (gastroesophageal reflux disease)     Hepatitis     as a child    HTN (hypertension)     IBS (irritable bowel syndrome)     TIA (transient ischemic attack)        Past Surgical History:   Procedure Laterality Date    CATARACT EXTRACTION EXTRACAPSULAR W/ INTRAOCULAR LENS IMPLANTATION Bilateral     CHOLECYSTECTOMY      COLONOSCOPY      ERCP      HYSTERECTOMY      TONSILLECTOMY         Clinical Decision Making:     History  Co-morbidities and personal factors that may impact the plan of care Examination  Body Structures and Functions, activity limitations and participation restrictions that may impact the plan of care Clinical Presentation   Decision Making/ Complexity Score   Co-morbidities:   [] Time since onset of injury / illness / exacerbation  [] Status of current condition  []Patient's cognitive status and safety concerns    [] Multiple Medical Problems (see med hx)  Personal Factors:   [] Patient's age  [] Prior Level of function   [] Patient's home situation (environment and family support)  [] Patient's level of motivation  [] Expected progression of patient      HISTORY:(criteria)    [x] 90005 - no personal factors/history    [] 07435 - has 1-2 personal factor/comorbidity      [] 49180 - has >3 personal factor/comorbidity     Body Regions:  [] Objective examination findings  [] Head     []  Neck  [] Trunk   [] Upper Extremity  [] Lower Extremity    Body Systems:  [] For communication ability, affect, cognition, language, and learning style: the assessment of the ability to make needs known, consciousness, orientation (person, place, and time), expected emotional /behavioral responses, and learning preferences (eg, learning barriers, education  needs)  [] For the neuromuscular system: a general assessment of gross coordinated movement (eg, balance, gait, locomotion, transfers, and transitions) and motor function  (motor control and motor learning)  [] For the musculoskeletal system: the assessment of gross symmetry, gross range of motion, gross strength, height, and weight  [] For the integumentary system: the assessment of pliability(texture), presence of scar formation, skin color, and skin integrity  [] For cardiovascular/pulmonary system: the assessment of heart rate, respiratory rate, blood pressure, and edema     Activity limitations:    [] Patient's cognitive status and saf ety concerns          [] Status of current condition      [] Weight bearing restriction  [] Cardiopulmunary Restriction    Participation Restrictions:   [] Goals and goal agreement with the patient     [] Rehab potential (prognosis) and probable outcome      Examination of Body System: (criteria)    [x] 78600 - addressing 1-2 elements    [] 66935 - addressing a total of 3 or more elements     [] 84842 -  Addressing a total of 4 or more elements         Clinical Presentation: (criteria)  Stable - 46599     On examination of body system using standardized tests and measures patient presents with 1-2 elements from any of the following: body structures and functions, activity limitations, and/or participation restrictions.  Leading to a clinical presentation that is considered stable and/or  uncomplicated                              Clinical Decision Making  (Eval Complexity):  Low- 51470     Time Tracking:     PT Received On: 01/13/18  PT Start Time: 0932     PT Stop Time: 0956  PT Total Time (min): 24 min     Billable Minutes: Evaluation 14 mins and Therapeutic Activity 10 mins      Ryan Holguin, PT  01/13/2018

## 2018-01-13 NOTE — CONSULTS
"  [facility]  Adult Nutrition  Consult Note    SUMMARY     Recommendations    Recommendation/Intervention:   1. Once medically able, initiate j-tube feeds. Recommend goal of Isosource 1.5 at 100mL/hr X 12hrs.    -Will provide 1800kcal, 81g protein, and 917mL fluid.   2. Once able, initiate clear liquids and advance as tolerated to Regular diet. Will monitor.   Goals: Pt to receive nutrition by RD follow-up.   Nutrition Goal Status: new  Communication of RD Recs: reviewed with RN    Reason for Assessment    Reason for Assessment: physician consult  Diagnosis: other (see comments) (s/p Whipple)  Relevent Medical History: adenocarcinoma of ampulla     Nutrition Discharge Planning: D/c with TF, see recs above.     Nutrition Prescription Ordered    Current Diet Order: NPO    Evaluation of Received Nutrients/Fluid Intake    % Intake of Estimated Energy Needs: 0 - 25 %  % Meal Intake: NPO     Nutrition/Diet History     Typical Food/Fluid Intake: Pt s/p whipple and had j-tube placed   Factors Affecting Nutritional Intake: NPO    Labs/Tests/Procedures/Meds     Pertinent Labs Reviewed: reviewed  Pertinent Labs Comments: Ca 7.6, P 2.5, Alb 2.6  Pertinent Medications Reviewed: reviewed     Physical Findings    Overall Physical Appearance: overweight  Tubes: jejunostomy tube  Oral/Mouth Cavity: WDL  Skin: incision (and drain in abd - 225mL output)    Anthropometrics    Temp: 97.9 °F (36.6 °C)  Height: 5' 3" (160 cm)  Weight Method: Stated  Weight: 73.5 kg (162 lb)  Ideal Body Weight (IBW), Female: 115 lb  % Ideal Body Weight, Female (lb): 140.87   BMI (Calculated): 28.8  BMI Grade: 25 - 29.9 - overweight     Estimated/Assessed Needs    Weight Used For Calorie Calculations: 73.5 kg (162 lb 0.6 oz)   Energy Calorie Requirements (kcal): 8297-1587  Energy Need Method: Kcal/kg (25-30)  Weight Used For Protein Calculations: 73.5 kg (162 lb 0.6 oz)  Protein Requirements: 88-110g (1.2-1.5g/kg)  Fluid Requirements " (mL): Per MD    Assessment and Plan    Nutrition Problem  Inadequate energy intake    Related to (etiology):   Decreased ability to consume adequate energy    Signs and Symptoms (as evidenced by):   NPO status, no form of nutrition at this time     Interventions/Recommendations (treatment strategy):  See recs above    Nutrition Diagnosis Status:   New    Monitor and Evaluation    Food and Nutrient Intake: energy intake  Food and Nutrient Adminstration: diet order, enteral and parenteral nutrition administration  Anthropometric Measurements: weight, weight change  Biochemical Data, Medical Tests and Procedures: other (specify) (All labs)  Nutrition-Focused Physical Findings: overall appearance    Nutrition Risk    Level of Risk: other (see comments) (2X/week)    Nutrition Follow-Up    RD Follow-up?: Yes

## 2018-01-13 NOTE — PT/OT/SLP EVAL
Occupational Therapy   Evaluation/Treatment    Name: Vonnie Lees  MRN: [id number]  Admitting Diagnosis:  <principal problem not specified> 1 Day Post-Op    Recommendations:     Discharge Recommendations: home with home health  Discharge Equipment Recommendations:  walker, rolling  Barriers to discharge:  None    History:     Occupational Profile:  Living Environment: Pt lives with  in [facility] with 9 OC front door, 2STE back door and walk-in shower with built in shower chair.   Previous level of function: PTA, pt reports being (I) with all ADLs and functional mobility   Equipment Owned:  none  Assistance upon Discharge: Pt will have assistance from her  upon d/c.     Past Medical History:   Diagnosis Date    Anxiety     Arrhythmia     paroxysmal venticular bigeminy    Biliary obstruction     with severe ductal dilation    Elevated liver enzymes     Endometriosis     GERD (gastroesophageal reflux disease)     Hepatitis     as a child    HTN (hypertension)     IBS (irritable bowel syndrome)     TIA (transient ischemic attack)        Past Surgical History:   Procedure Laterality Date    CATARACT EXTRACTION EXTRACAPSULAR W/ INTRAOCULAR LENS IMPLANTATION Bilateral     CHOLECYSTECTOMY      COLONOSCOPY      ERCP      HYSTERECTOMY      TONSILLECTOMY         Subjective     Chief Complaint: dizzy  Patient/Family stated goals: to return to [facility]  Communicated with: RN prior to session.  Pain/Comfort:  · Pain Rating 1: 5/10  · Location - Orientation 1: generalized  · Location 1: abdomen  · Pain Addressed 1: Reposition, Distraction  · Pain Rating Post-Intervention 1: 5/10    Objective:     Patient found with: peripheral IV, JANET drain, telemetry, pulse ox     General Precautions: Standard, fall   Orthopedic Precautions:N/A   Braces: N/A     Occupational Performance:    Bed Mobility:    · Patient completed Rolling/Turning to Right with minimum assistance  · Patient completed Scooting/Bridging with minimum  assistance  · Patient completed Supine to Sit with minimum assistance  · Patient completed Sit to Supine with minimum assistance    Functional Mobility/Transfers:  · Patient completed Sit <> Stand Transfer with minimum assistance  with  no assistive device     Activities of Daily Living:  · UB Dressing: maximal assistance doff/don gown sitting EOB    Cognitive/Visual Perceptual:  Cognitive/Psychosocial Skills:     -       Oriented to: Person, Place and Time   -       Follows Commands/attention:Follows multistep  commands  -       Communication: clear/fluent    Physical Exam:  Balance:    -       Pt demonstrates good overall sitting balance and fair dynamic standing balance as she side stepped ~4-5 steps to HOB  Upper Extremity Range of Motion:     -       Right Upper Extremity: WFL  -       Left Upper Extremity: WFL  Upper Extremity Strength:    -       Right Upper Extremity: WFL  -       Left Upper Extremity: WFL   Strength:    -       Right Upper Extremity: WFL  -       Left Upper Extremity: WFL  Fine Motor Coordination:    -       Intact    Patient left supine with all lines intact, call button in reach and   present    AMPA 6 Click:  AMPA Total Score: 13    Treatment & Education:  OT eval complete. Pt educated on importance of OOB activity with staff assist and safety with functional mobility. Pt reports significant fatigue, weakness, and feeling dizzy. Following sitting EOB ~10 minutes, pt's dizziness subsided. All assist levels listed above for ADLs and functional mobility. ADL tasks limited 2/2 increased fatigue as session progressed.   Education:    Assessment:     Vonnie Lees is a 72 y.o. female with a medical diagnosis of <principal problem not specified>.  She presents with decreased activity tolerance 2/2 fatigue, weakness, and dizziness. She tolerated overall session well demonstrating good functional mobility this date with safe functional transfer but w/ noted increased fatigue w/  "movements.  Performance deficits affecting function are weakness, impaired endurance, impaired self care skills, impaired functional mobilty, gait instability, impaired balance, pain.      Rehab Prognosis:  Good; patient would benefit from acute skilled OT services to address these deficits and reach maximum level of function.         Clinical Decision Makin.  OT Low:  "Pt evaluation falls under low complexity for evaluation coding due to performance deficits noted in 1-3 areas as stated above and 0 co-morbities affecting current functional status. Data obtained from problem focused assessments. No modifications or assistance was required for completion of evaluation. Only brief occupational profile and history review completed."     Plan:     Patient to be seen 4 x/week to address the above listed problems via self-care/home management, therapeutic activities, therapeutic exercises  · Plan of Care Expires: 18  · Plan of Care Reviewed with: patient, spouse    This Plan of care has been discussed with the patient who was involved in its development and understands and is in agreement with the identified goals and treatment plan    GOALS:    Occupational Therapy Goals        Problem: Occupational Therapy Goal    Goal Priority Disciplines Outcome Interventions   Occupational Therapy Goal     OT, PT/OT Ongoing (interventions implemented as appropriate)    Description:  Goals to be met by: 7 days      Patient will increase functional independence with ADLs by performing:    UE Dressing with Supervision.  LE Dressing with Supervision.  Grooming while standing with Supervision.  Toileting from toilet with Supervision for hygiene and clothing management.   Supine to sit with Supervision.  Stand pivot transfers with Supervision.  Toilet transfer to toilet with Supervision.                      Time Tracking:     OT Date of Treatment: 18  OT Start Time: 931  OT Stop Time: 957  OT Total Time (min): 26 " min    Billable Minutes:Evaluation 13  Self Care/Home Management 13    Ilene Charlton, OT  1/13/2018

## 2018-01-13 NOTE — PLAN OF CARE
Problem: Patient Care Overview  Goal: Plan of Care Review  Outcome: Ongoing (interventions implemented as appropriate)  POC reviewed with pt. Who verbalized understanding. AAOx 4. Remains free of falls and injuries. VSS. NPO, denies nausea. Pain controlled with PCA. Assist with activity. LEONARD-telfa. JANET-serosanleigh ann. J tube- clamped-dressing saturated and changed overnight. Yolo dampened. Pierre. Telemetry-SR. . No acute events. No distress noted. RONNY.

## 2018-01-13 NOTE — PLAN OF CARE
Problem: Occupational Therapy Goal  Goal: Occupational Therapy Goal  Goals to be met by: 7 days      Patient will increase functional independence with ADLs by performing:    UE Dressing with Supervision.  LE Dressing with Supervision.  Grooming while standing with Supervision.  Toileting from toilet with Supervision for hygiene and clothing management.   Supine to sit with Supervision.  Stand pivot transfers with Supervision.  Toilet transfer to toilet with Supervision.    Outcome: Ongoing (interventions implemented as appropriate)  Pt goals established today based on her presenting functional level     Comments: Cont OT POC

## 2018-01-13 NOTE — PROGRESS NOTES
2100- Urine output was 100cc  0200- Urine output was 125cc.  Notified Dr Brown, ordered to monitor patient.

## 2018-01-13 NOTE — ASSESSMENT & PLAN NOTE
73 yo male s/p whipple, common bile duct exploration and j-tube placement. On pathway.     -daily labs.   -replete lytes as needed  -pulm toilet- IS, duonebs   -PT/OT. Encourage ambulation and OOBTC today   -decreased UOP overnight- 1L bolus going. Will keep saini in place. Strict I/Os   -mIVF @ 80cc/hr   -NPO  -Nutrition consult for post operative whipple TF   -PCA pain control  -dvt ppx

## 2018-01-14 LAB
ALBUMIN SERPL BCP-MCNC: 2.3 G/DL
ALP SERPL-CCNC: 78 U/L
ALT SERPL W/O P-5'-P-CCNC: 25 U/L
ANION GAP SERPL CALC-SCNC: 5 MMOL/L
AST SERPL-CCNC: 27 U/L
BASOPHILS # BLD AUTO: 0.04 K/UL
BASOPHILS NFR BLD: 0.2 %
BILIRUB SERPL-MCNC: 0.5 MG/DL
BUN SERPL-MCNC: 13 MG/DL
CA-I BLDV-SCNC: 1.02 MMOL/L
CALCIUM SERPL-MCNC: 7.7 MG/DL
CHLORIDE SERPL-SCNC: 116 MMOL/L
CO2 SERPL-SCNC: 17 MMOL/L
CREAT SERPL-MCNC: 0.7 MG/DL
DIFFERENTIAL METHOD: ABNORMAL
EOSINOPHIL # BLD AUTO: 0 K/UL
EOSINOPHIL NFR BLD: 0 %
ERYTHROCYTE [DISTWIDTH] IN BLOOD BY AUTOMATED COUNT: 14.4 %
EST. GFR  (AFRICAN AMERICAN): >60 ML/MIN/1.73 M^2
EST. GFR  (NON AFRICAN AMERICAN): >60 ML/MIN/1.73 M^2
GLUCOSE SERPL-MCNC: 74 MG/DL
HCT VFR BLD AUTO: 26.3 %
HGB BLD-MCNC: 8.3 G/DL
IMM GRANULOCYTES # BLD AUTO: 0.23 K/UL
IMM GRANULOCYTES NFR BLD AUTO: 1.1 %
LYMPHOCYTES # BLD AUTO: 1.3 K/UL
LYMPHOCYTES NFR BLD: 6.2 %
MAGNESIUM SERPL-MCNC: 1.9 MG/DL
MCH RBC QN AUTO: 29.4 PG
MCHC RBC AUTO-ENTMCNC: 31.6 G/DL
MCV RBC AUTO: 93 FL
MONOCYTES # BLD AUTO: 1 K/UL
MONOCYTES NFR BLD: 4.5 %
NEUTROPHILS # BLD AUTO: 18.9 K/UL
NEUTROPHILS NFR BLD: 88 %
NRBC BLD-RTO: 0 /100 WBC
PHOSPHATE SERPL-MCNC: 2.3 MG/DL
PLATELET # BLD AUTO: 150 K/UL
PMV BLD AUTO: 10.5 FL
POTASSIUM SERPL-SCNC: 4.3 MMOL/L
PROT SERPL-MCNC: 4.5 G/DL
RBC # BLD AUTO: 2.82 M/UL
SODIUM SERPL-SCNC: 138 MMOL/L
WBC # BLD AUTO: 21.43 K/UL

## 2018-01-14 PROCEDURE — 82330 ASSAY OF CALCIUM: CPT

## 2018-01-14 PROCEDURE — 83735 ASSAY OF MAGNESIUM: CPT

## 2018-01-14 PROCEDURE — 94640 AIRWAY INHALATION TREATMENT: CPT

## 2018-01-14 PROCEDURE — 25000003 PHARM REV CODE 250: Performed by: SURGERY

## 2018-01-14 PROCEDURE — 80053 COMPREHEN METABOLIC PANEL: CPT

## 2018-01-14 PROCEDURE — 25000242 PHARM REV CODE 250 ALT 637 W/ HCPCS: Performed by: NURSE PRACTITIONER

## 2018-01-14 PROCEDURE — 85025 COMPLETE CBC W/AUTO DIFF WBC: CPT

## 2018-01-14 PROCEDURE — 25000003 PHARM REV CODE 250: Performed by: STUDENT IN AN ORGANIZED HEALTH CARE EDUCATION/TRAINING PROGRAM

## 2018-01-14 PROCEDURE — 63600175 PHARM REV CODE 636 W HCPCS: Performed by: SURGERY

## 2018-01-14 PROCEDURE — 63600175 PHARM REV CODE 636 W HCPCS: Performed by: STUDENT IN AN ORGANIZED HEALTH CARE EDUCATION/TRAINING PROGRAM

## 2018-01-14 PROCEDURE — 84100 ASSAY OF PHOSPHORUS: CPT

## 2018-01-14 PROCEDURE — C9113 INJ PANTOPRAZOLE SODIUM, VIA: HCPCS | Performed by: SURGERY

## 2018-01-14 PROCEDURE — 94799 UNLISTED PULMONARY SVC/PX: CPT

## 2018-01-14 PROCEDURE — 99900035 HC TECH TIME PER 15 MIN (STAT)

## 2018-01-14 PROCEDURE — 36415 COLL VENOUS BLD VENIPUNCTURE: CPT

## 2018-01-14 PROCEDURE — S5010 5% DEXTROSE AND 0.45% SALINE: HCPCS | Performed by: STUDENT IN AN ORGANIZED HEALTH CARE EDUCATION/TRAINING PROGRAM

## 2018-01-14 PROCEDURE — 20600001 HC STEP DOWN PRIVATE ROOM

## 2018-01-14 PROCEDURE — 94770 HC EXHALED C02 TEST: CPT

## 2018-01-14 RX ORDER — VERAPAMIL HYDROCHLORIDE 40 MG/1
80 TABLET ORAL ONCE
Status: COMPLETED | OUTPATIENT
Start: 2018-01-14 | End: 2018-01-14

## 2018-01-14 RX ORDER — FLECAINIDE ACETATE 50 MG/1
50 TABLET ORAL 2 TIMES DAILY
Status: DISCONTINUED | OUTPATIENT
Start: 2018-01-14 | End: 2018-01-23 | Stop reason: HOSPADM

## 2018-01-14 RX ORDER — HYDRALAZINE HYDROCHLORIDE 10 MG/1
10 TABLET, FILM COATED ORAL 3 TIMES DAILY
Status: DISCONTINUED | OUTPATIENT
Start: 2018-01-14 | End: 2018-01-23 | Stop reason: HOSPADM

## 2018-01-14 RX ORDER — ALPRAZOLAM 0.5 MG/1
0.5 TABLET ORAL 3 TIMES DAILY PRN
Status: DISCONTINUED | OUTPATIENT
Start: 2018-01-14 | End: 2018-01-23 | Stop reason: HOSPADM

## 2018-01-14 RX ORDER — LABETALOL HYDROCHLORIDE 5 MG/ML
10 INJECTION, SOLUTION INTRAVENOUS EVERY 4 HOURS PRN
Status: DISCONTINUED | OUTPATIENT
Start: 2018-01-14 | End: 2018-01-15

## 2018-01-14 RX ORDER — KETOROLAC TROMETHAMINE 15 MG/ML
30 INJECTION, SOLUTION INTRAMUSCULAR; INTRAVENOUS EVERY 8 HOURS
Status: COMPLETED | OUTPATIENT
Start: 2018-01-14 | End: 2018-01-17

## 2018-01-14 RX ORDER — HYDROCODONE BITARTRATE AND ACETAMINOPHEN 7.5; 325 MG/15ML; MG/15ML
15 SOLUTION ORAL EVERY 4 HOURS PRN
Status: DISCONTINUED | OUTPATIENT
Start: 2018-01-14 | End: 2018-01-15

## 2018-01-14 RX ORDER — DEXTROSE MONOHYDRATE AND SODIUM CHLORIDE 5; .45 G/100ML; G/100ML
INJECTION, SOLUTION INTRAVENOUS CONTINUOUS
Status: DISCONTINUED | OUTPATIENT
Start: 2018-01-14 | End: 2018-01-16

## 2018-01-14 RX ADMIN — PIPERACILLIN SODIUM AND TAZOBACTAM SODIUM 4.5 G: 4; .5 INJECTION, POWDER, LYOPHILIZED, FOR SOLUTION INTRAVENOUS at 04:01

## 2018-01-14 RX ADMIN — DEXTROSE AND SODIUM CHLORIDE: 5; .45 INJECTION, SOLUTION INTRAVENOUS at 10:01

## 2018-01-14 RX ADMIN — HYDROCODONE BITARTRATE AND ACETAMINOPHEN 15 ML: 7.5; 325 SOLUTION ORAL at 08:01

## 2018-01-14 RX ADMIN — ENOXAPARIN SODIUM 40 MG: 100 INJECTION SUBCUTANEOUS at 05:01

## 2018-01-14 RX ADMIN — IPRATROPIUM BROMIDE AND ALBUTEROL SULFATE 3 ML: .5; 3 SOLUTION RESPIRATORY (INHALATION) at 08:01

## 2018-01-14 RX ADMIN — HYDROCODONE BITARTRATE AND ACETAMINOPHEN 15 ML: 7.5; 325 SOLUTION ORAL at 09:01

## 2018-01-14 RX ADMIN — SODIUM CHLORIDE 500 ML: 900 INJECTION, SOLUTION INTRAVENOUS at 08:01

## 2018-01-14 RX ADMIN — MINERAL OIL AND WHITE PETROLATUM: 150; 830 OINTMENT OPHTHALMIC at 08:01

## 2018-01-14 RX ADMIN — PIPERACILLIN SODIUM AND TAZOBACTAM SODIUM 4.5 G: 4; .5 INJECTION, POWDER, LYOPHILIZED, FOR SOLUTION INTRAVENOUS at 08:01

## 2018-01-14 RX ADMIN — ALPRAZOLAM 0.5 MG: 0.5 TABLET ORAL at 01:01

## 2018-01-14 RX ADMIN — FLECAINIDE ACETATE 50 MG: 50 TABLET ORAL at 08:01

## 2018-01-14 RX ADMIN — KETOROLAC TROMETHAMINE 30 MG: 15 INJECTION, SOLUTION INTRAMUSCULAR; INTRAVENOUS at 10:01

## 2018-01-14 RX ADMIN — LABETALOL HYDROCHLORIDE 10 MG: 5 INJECTION, SOLUTION INTRAVENOUS at 04:01

## 2018-01-14 RX ADMIN — IPRATROPIUM BROMIDE AND ALBUTEROL SULFATE 3 ML: .5; 3 SOLUTION RESPIRATORY (INHALATION) at 01:01

## 2018-01-14 RX ADMIN — VERAPAMIL HYDROCHLORIDE 80 MG: 40 TABLET ORAL at 10:01

## 2018-01-14 RX ADMIN — ALPRAZOLAM 0.5 MG: 0.5 TABLET ORAL at 09:01

## 2018-01-14 RX ADMIN — FLECAINIDE ACETATE 50 MG: 50 TABLET ORAL at 10:01

## 2018-01-14 RX ADMIN — HYDROCODONE BITARTRATE AND ACETAMINOPHEN 15 ML: 7.5; 325 SOLUTION ORAL at 04:01

## 2018-01-14 RX ADMIN — KETOROLAC TROMETHAMINE 30 MG: 15 INJECTION, SOLUTION INTRAMUSCULAR; INTRAVENOUS at 01:01

## 2018-01-14 RX ADMIN — HYDRALAZINE HYDROCHLORIDE 10 MG: 10 TABLET, FILM COATED ORAL at 10:01

## 2018-01-14 RX ADMIN — PANTOPRAZOLE SODIUM 40 MG: 40 INJECTION, POWDER, FOR SOLUTION INTRAVENOUS at 09:01

## 2018-01-14 RX ADMIN — IPRATROPIUM BROMIDE AND ALBUTEROL SULFATE 3 ML: .5; 3 SOLUTION RESPIRATORY (INHALATION) at 07:01

## 2018-01-14 RX ADMIN — PIPERACILLIN SODIUM AND TAZOBACTAM SODIUM 4.5 G: 4; .5 INJECTION, POWDER, LYOPHILIZED, FOR SOLUTION INTRAVENOUS at 12:01

## 2018-01-14 RX ADMIN — HYDRALAZINE HYDROCHLORIDE 10 MG: 10 TABLET, FILM COATED ORAL at 01:01

## 2018-01-14 NOTE — ASSESSMENT & PLAN NOTE
73 yo male s/p whipple, common bile duct exploration and j-tube placement. On pathway.     -daily labs  -replete lytes as needed  -pulm toilet- IS, michaelbs   -PT/OT. Encourage ambulation and OOBTC today   -Decreased UOP during day shift- 3L bolus going. Strict I/Os   -mIVF @ 50cc/hr   -Sips and chips today- will discuss TF starting tomorrow    -d/c PCA - patient not using due to her concern for nausea and throat swelling. Will switch to Toradol and hycet through Jtube  -restart home medications  -dvt ppx   -CRP tomorrow

## 2018-01-14 NOTE — PROGRESS NOTES
Patient complained of uncontrolled pain, SOB, and throat swelling. Elevated BP sys 170's. Patient has a Dilaudid PCA and not pressing button. Dr. James notified and came to bedside to assess. Doctor ordered IV hydralazine, Tylenol, and benadryl. Nurse will continue to monitor.

## 2018-01-14 NOTE — PLAN OF CARE
Problem: Patient Care Overview  Goal: Plan of Care Review  Outcome: Ongoing (interventions implemented as appropriate)  Plan of care discussed with pt. Pt verbalizes understanding. Pt remained NPO except for sips with meds. Pain somewhat managed with dilaudid pca. Pt on telemetry, normal sinus rhythm. Pt positions independently. Vital signs stable on 2L NC. Three 1L NS bolus given for low urine output. Pt remains free of falls. Will continue to monitor.

## 2018-01-14 NOTE — SUBJECTIVE & OBJECTIVE
Interval History: POD #2 whipple, common bile duct exploration and j-tube placement.   Reports sensation of throat swelling overnight, relieved with benadryl administration. No trouble breathing. O2 sats upper 90s. Reports increased abdominal pain at incision site not fully relieved with the PCA. Ambulated small amount yest. No n/v. Marginal UOP- given 3 L boluses during day shift.     Medications:  Continuous Infusions:   sodium chloride 0.9% 125 mL/hr at 01/13/18 1551    hydromorphone in 0.9 % NaCl 6 mg/30 ml       Scheduled Meds:   albuterol-ipratropium 2.5mg-0.5mg/3mL  3 mL Nebulization Q6H WAKE    enoxaparin  40 mg Subcutaneous Daily    pantoprazole  40 mg Intravenous Daily    piperacillin-tazobactam (ZOSYN) IVPB  4.5 g Intravenous Q8H    sodium chloride 0.9%  500 mL Intravenous Once     PRN Meds:diphenhydrAMINE, labetalol, naloxone     Review of patient's allergies indicates:   Allergen Reactions    Aldoril d30 [methyldopa-hydrochlorothiazide]      Itching eyeball    Flagyl [metronidazole hcl] Other (See Comments)     Yeast infection.    Hydrochlorothiazide      Itching eyeball.    Keflex [cephalexin] Diarrhea    Zithromax [azithromycin] Diarrhea     Objective:     Vital Signs (Most Recent):  Temp: 98.7 °F (37.1 °C) (01/14/18 0408)  Pulse: 82 (01/14/18 0703)  Resp: 20 (01/14/18 0415)  BP: 125/60 (01/14/18 0458)  SpO2: 96 % (01/14/18 0703) Vital Signs (24h Range):  Temp:  [96.3 °F (35.7 °C)-98.7 °F (37.1 °C)] 98.7 °F (37.1 °C)  Pulse:  [] 82  Resp:  [12-22] 20  SpO2:  [93 %-97 %] 96 %  BP: (109-179)/(51-74) 125/60     Weight: 73.5 kg (162 lb)  Body mass index is 28.7 kg/m².    Intake/Output - Last 3 Shifts       01/12 0700 - 01/13 0659 01/13 0700 - 01/14 0659 01/14 0700 - 01/15 0659    P.O. 0 30     I.V. (mL/kg) 9103.2 (123.9) 3268.8 (44.5)     NG/GT  80     IV Piggyback  2600     Total Intake(mL/kg) 9103.2 (123.9) 5978.8 (81.3)     Urine (mL/kg/hr) 3325 (1.9) 885 (0.5)     Emesis/NG output 0  (0)      Drains 225 (0.1) 38 (0)     Stool 0 (0)      Blood 500 (0.3)      Total Output 4050 923      Net +5053.2 +5055.8             Stool Occurrence 0 x          Physical Exam   Constitutional: She is oriented to person, place, and time. She appears well-developed and well-nourished. No distress.   HENT:   Head: Normocephalic and atraumatic.   Eyes: EOM are normal. Pupils are equal, round, and reactive to light.   Neck: Normal range of motion. Neck supple.   Cardiovascular: Normal rate, regular rhythm and intact distal pulses.    Pulmonary/Chest: Effort normal. No respiratory distress.   Abdominal: Soft. She exhibits no distension. There is tenderness (at incision site).   Midline incision c/d/i. Jtube in place with no bleeding or oozing from around site. JANET in place.    Musculoskeletal: Normal range of motion.   Neurological: She is alert and oriented to person, place, and time.   Skin: Skin is warm and dry. She is not diaphoretic.     Significant Labs:  CBC:     Recent Labs  Lab 01/14/18  0433   WBC 21.43*   RBC 2.82*   HGB 8.3*   HCT 26.3*      MCV 93   MCH 29.4   MCHC 31.6*     BMP:     Recent Labs  Lab 01/14/18  0433   GLU 74      K 4.3   *   CO2 17*   BUN 13   CREATININE 0.7   CALCIUM 7.7*   MG 1.9     CMP:     Recent Labs  Lab 01/14/18  0433   GLU 74   CALCIUM 7.7*   ALBUMIN 2.3*   PROT 4.5*      K 4.3   CO2 17*   *   BUN 13   CREATININE 0.7   ALKPHOS 78   ALT 25   AST 27   BILITOT 0.5     LFTs:     Recent Labs  Lab 01/14/18  0433   ALT 25   AST 27   ALKPHOS 78   BILITOT 0.5   PROT 4.5*   ALBUMIN 2.3*     Coagulation: No results for input(s): LABPROT, INR, APTT in the last 168 hours.

## 2018-01-14 NOTE — PROGRESS NOTES
[facility]  General Surgery  Progress Note    Subjective:     History of Present Illness:  No notes on file    Post-Op Info:  Procedure(s) (LRB):  WHIPPLE (N/A)  EXPLORATION-COMMON BILE DUCT (N/A)  PLACEMENT-TUBE-JEJUNOSTOMY (N/A)   2 Days Post-Op     Interval History: POD #2 whipple, common bile duct exploration and j-tube placement.   Reports sensation of throat swelling overnight, relieved with benadryl administration. No trouble breathing. O2 sats upper 90s. Reports increased abdominal pain at incision site not fully relieved with the PCA. Ambulated small amount yest. No n/v. Marginal UOP- given 3 L boluses during day shift.     Medications:  Continuous Infusions:   sodium chloride 0.9% 125 mL/hr at 01/13/18 1551    hydromorphone in 0.9 % NaCl 6 mg/30 ml       Scheduled Meds:   albuterol-ipratropium 2.5mg-0.5mg/3mL  3 mL Nebulization Q6H WAKE    enoxaparin  40 mg Subcutaneous Daily    pantoprazole  40 mg Intravenous Daily    piperacillin-tazobactam (ZOSYN) IVPB  4.5 g Intravenous Q8H    sodium chloride 0.9%  500 mL Intravenous Once     PRN Meds:diphenhydrAMINE, labetalol, naloxone     Review of patient's allergies indicates:   Allergen Reactions    Aldoril d30 [methyldopa-hydrochlorothiazide]      Itching eyeball    Flagyl [metronidazole hcl] Other (See Comments)     Yeast infection.    Hydrochlorothiazide      Itching eyeball.    Keflex [cephalexin] Diarrhea    Zithromax [azithromycin] Diarrhea     Objective:     Vital Signs (Most Recent):  Temp: 98.7 °F (37.1 °C) (01/14/18 0408)  Pulse: 82 (01/14/18 0703)  Resp: 20 (01/14/18 0415)  BP: 125/60 (01/14/18 0458)  SpO2: 96 % (01/14/18 0703) Vital Signs (24h Range):  Temp:  [96.3 °F (35.7 °C)-98.7 °F (37.1 °C)] 98.7 °F (37.1 °C)  Pulse:  [] 82  Resp:  [12-22] 20  SpO2:  [93 %-97 %] 96 %  BP: (109-179)/(51-74) 125/60     Weight: 73.5 kg (162 lb)  Body mass index is 28.7 kg/m².    Intake/Output - Last 3 Shifts       01/12 0700 -  01/13 0659 01/13 0700 - 01/14 0659 01/14 0700 - 01/15 0659    P.O. 0 30     I.V. (mL/kg) 9103.2 (123.9) 3268.8 (44.5)     NG/GT  80     IV Piggyback  2600     Total Intake(mL/kg) 9103.2 (123.9) 5978.8 (81.3)     Urine (mL/kg/hr) 3325 (1.9) 885 (0.5)     Emesis/NG output 0 (0)      Drains 225 (0.1) 38 (0)     Stool 0 (0)      Blood 500 (0.3)      Total Output 4050 923      Net +5053.2 +5055.8             Stool Occurrence 0 x          Physical Exam   Constitutional: She is oriented to person, place, and time. She appears well-developed and well-nourished. No distress.   HENT:   Head: Normocephalic and atraumatic.   Eyes: EOM are normal. Pupils are equal, round, and reactive to light.   Neck: Normal range of motion. Neck supple.   Cardiovascular: Normal rate, regular rhythm and intact distal pulses.    Pulmonary/Chest: Effort normal. No respiratory distress.   Abdominal: Soft. She exhibits no distension. There is tenderness (at incision site).   Midline incision c/d/i. Jtube in place with no bleeding or oozing from around site. JANET in place.    Musculoskeletal: Normal range of motion.   Neurological: She is alert and oriented to person, place, and time.   Skin: Skin is warm and dry. She is not diaphoretic.     Significant Labs:  CBC:     Recent Labs  Lab 01/14/18  0433   WBC 21.43*   RBC 2.82*   HGB 8.3*   HCT 26.3*      MCV 93   MCH 29.4   MCHC 31.6*     BMP:     Recent Labs  Lab 01/14/18  0433   GLU 74      K 4.3   *   CO2 17*   BUN 13   CREATININE 0.7   CALCIUM 7.7*   MG 1.9     CMP:     Recent Labs  Lab 01/14/18  0433   GLU 74   CALCIUM 7.7*   ALBUMIN 2.3*   PROT 4.5*      K 4.3   CO2 17*   *   BUN 13   CREATININE 0.7   ALKPHOS 78   ALT 25   AST 27   BILITOT 0.5     LFTs:     Recent Labs  Lab 01/14/18  0433   ALT 25   AST 27   ALKPHOS 78   BILITOT 0.5   PROT 4.5*   ALBUMIN 2.3*     Coagulation: No results for input(s): LABPROT, INR, APTT in the last 168 hours.      Assessment/Plan:      Ampullary carcinoma    71 yo male s/p whipple, common bile duct exploration and j-tube placement. On pathway.     -daily labs  -replete lytes as needed  -pulm toilet- IS, duonebs   -PT/OT. Encourage ambulation and OOBTC today   -Decreased UOP during day shift- 3L bolus going. Strict I/Os   -mIVF @ 50cc/hr   -Sips and chips today- will discuss TF starting tomorrow    -d/c PCA - patient not using due to her concern for nausea and throat swelling. Will switch to Toradol and hycet through Jtube  -restart home medications  -dvt ppx   -CRP tomorrow             Niki James MD  General Surgery  [facility]

## 2018-01-14 NOTE — PLAN OF CARE
Problem: Patient Care Overview  Goal: Plan of Care Review  Outcome: Ongoing (interventions implemented as appropriate)  Patient is awake and oriented. Complains of pain and weakness. Dilaudid PCA. Elevated BP overnight. IV hydralazine and labetalol administered. Telemetry monitoring. Pierre to gravity. Per Dr. James do not remove overnight due to LUOP. J- tube clamped and flushed q8hrs. Mitchell drain to abdomen. Free of injuries and falls. Plan of care discussed with patient and daughter. Verbalized understanding. Nurse will continue to monitor.

## 2018-01-14 NOTE — PLAN OF CARE
Problem: Patient Care Overview  Goal: Plan of Care Review  Outcome: Ongoing (interventions implemented as appropriate)  Plan of care discussed with pt. Pt verbalizes understanding. Pt remained NPO except for sips with meds and some ice. Pain better managed with prn hydrocodone and scheduled toradol. Pt on telemetry, normal sinus rhythm. Pt positions independently. Vital signs stable on 1L NC. J tube intact. JANET drain intact, serosang drainage. Pierre removed this AM; pt DTV. Pt remains free of falls. Will continue to monitor.

## 2018-01-15 LAB
ALBUMIN SERPL BCP-MCNC: 2.4 G/DL
ALP SERPL-CCNC: 133 U/L
ALT SERPL W/O P-5'-P-CCNC: 23 U/L
AMYLASE, BODY FLUID: 431 U/L
ANION GAP SERPL CALC-SCNC: 6 MMOL/L
AST SERPL-CCNC: 28 U/L
BASOPHILS # BLD AUTO: 0.05 K/UL
BASOPHILS NFR BLD: 0.3 %
BILIRUB SERPL-MCNC: 0.5 MG/DL
BODY FLUID SOURCE AMYLASE: NORMAL
BUN SERPL-MCNC: 9 MG/DL
CA-I BLDV-SCNC: 1.11 MMOL/L
CALCIUM SERPL-MCNC: 8.1 MG/DL
CHLORIDE SERPL-SCNC: 114 MMOL/L
CO2 SERPL-SCNC: 18 MMOL/L
CREAT SERPL-MCNC: 0.7 MG/DL
CRP SERPL-MCNC: 221.52 MG/L
DIFFERENTIAL METHOD: ABNORMAL
EOSINOPHIL # BLD AUTO: 0.1 K/UL
EOSINOPHIL NFR BLD: 0.7 %
ERYTHROCYTE [DISTWIDTH] IN BLOOD BY AUTOMATED COUNT: 14.3 %
EST. GFR  (AFRICAN AMERICAN): >60 ML/MIN/1.73 M^2
EST. GFR  (NON AFRICAN AMERICAN): >60 ML/MIN/1.73 M^2
GLUCOSE SERPL-MCNC: 78 MG/DL
HCT VFR BLD AUTO: 24 %
HGB BLD-MCNC: 7.9 G/DL
IMM GRANULOCYTES # BLD AUTO: 0.16 K/UL
IMM GRANULOCYTES NFR BLD AUTO: 0.8 %
LYMPHOCYTES # BLD AUTO: 1.3 K/UL
LYMPHOCYTES NFR BLD: 6.6 %
MAGNESIUM SERPL-MCNC: 2.1 MG/DL
MCH RBC QN AUTO: 29.6 PG
MCHC RBC AUTO-ENTMCNC: 32.9 G/DL
MCV RBC AUTO: 90 FL
MONOCYTES # BLD AUTO: 0.8 K/UL
MONOCYTES NFR BLD: 3.9 %
NEUTROPHILS # BLD AUTO: 16.9 K/UL
NEUTROPHILS NFR BLD: 87.7 %
NRBC BLD-RTO: 0 /100 WBC
PHOSPHATE SERPL-MCNC: 1.6 MG/DL
PLATELET # BLD AUTO: 147 K/UL
PMV BLD AUTO: 11 FL
POTASSIUM SERPL-SCNC: 3.8 MMOL/L
PROT SERPL-MCNC: 5.1 G/DL
RBC # BLD AUTO: 2.67 M/UL
SODIUM SERPL-SCNC: 138 MMOL/L
WBC # BLD AUTO: 19.26 K/UL

## 2018-01-15 PROCEDURE — 99900035 HC TECH TIME PER 15 MIN (STAT)

## 2018-01-15 PROCEDURE — 85025 COMPLETE CBC W/AUTO DIFF WBC: CPT

## 2018-01-15 PROCEDURE — 84100 ASSAY OF PHOSPHORUS: CPT

## 2018-01-15 PROCEDURE — 86141 C-REACTIVE PROTEIN HS: CPT

## 2018-01-15 PROCEDURE — 25000003 PHARM REV CODE 250: Performed by: SURGERY

## 2018-01-15 PROCEDURE — 63600175 PHARM REV CODE 636 W HCPCS: Performed by: SURGERY

## 2018-01-15 PROCEDURE — 25000242 PHARM REV CODE 250 ALT 637 W/ HCPCS: Performed by: NURSE PRACTITIONER

## 2018-01-15 PROCEDURE — 63600175 PHARM REV CODE 636 W HCPCS: Performed by: STUDENT IN AN ORGANIZED HEALTH CARE EDUCATION/TRAINING PROGRAM

## 2018-01-15 PROCEDURE — 94640 AIRWAY INHALATION TREATMENT: CPT

## 2018-01-15 PROCEDURE — 80053 COMPREHEN METABOLIC PANEL: CPT

## 2018-01-15 PROCEDURE — 82150 ASSAY OF AMYLASE: CPT

## 2018-01-15 PROCEDURE — S5010 5% DEXTROSE AND 0.45% SALINE: HCPCS | Performed by: STUDENT IN AN ORGANIZED HEALTH CARE EDUCATION/TRAINING PROGRAM

## 2018-01-15 PROCEDURE — 27000221 HC OXYGEN, UP TO 24 HOURS

## 2018-01-15 PROCEDURE — 94761 N-INVAS EAR/PLS OXIMETRY MLT: CPT

## 2018-01-15 PROCEDURE — 25000003 PHARM REV CODE 250: Performed by: NURSE PRACTITIONER

## 2018-01-15 PROCEDURE — 20600001 HC STEP DOWN PRIVATE ROOM

## 2018-01-15 PROCEDURE — 82330 ASSAY OF CALCIUM: CPT

## 2018-01-15 PROCEDURE — 25000003 PHARM REV CODE 250: Performed by: STUDENT IN AN ORGANIZED HEALTH CARE EDUCATION/TRAINING PROGRAM

## 2018-01-15 PROCEDURE — 83735 ASSAY OF MAGNESIUM: CPT

## 2018-01-15 PROCEDURE — C9113 INJ PANTOPRAZOLE SODIUM, VIA: HCPCS | Performed by: SURGERY

## 2018-01-15 PROCEDURE — 36415 COLL VENOUS BLD VENIPUNCTURE: CPT

## 2018-01-15 RX ORDER — HYDROCODONE BITARTRATE AND ACETAMINOPHEN 7.5; 325 MG/15ML; MG/15ML
30 SOLUTION ORAL EVERY 4 HOURS PRN
Status: DISCONTINUED | OUTPATIENT
Start: 2018-01-15 | End: 2018-01-19

## 2018-01-15 RX ORDER — HYDRALAZINE HYDROCHLORIDE 20 MG/ML
10 INJECTION INTRAMUSCULAR; INTRAVENOUS EVERY 8 HOURS PRN
Status: DISCONTINUED | OUTPATIENT
Start: 2018-01-15 | End: 2018-01-23 | Stop reason: HOSPADM

## 2018-01-15 RX ORDER — IRBESARTAN 75 MG/1
150 TABLET ORAL 2 TIMES DAILY
Status: DISCONTINUED | OUTPATIENT
Start: 2018-01-15 | End: 2018-01-23 | Stop reason: HOSPADM

## 2018-01-15 RX ORDER — ESTRADIOL 0.5 MG/1
0.5 TABLET ORAL DAILY
Status: DISCONTINUED | OUTPATIENT
Start: 2018-01-15 | End: 2018-01-23 | Stop reason: HOSPADM

## 2018-01-15 RX ORDER — IPRATROPIUM BROMIDE AND ALBUTEROL SULFATE 2.5; .5 MG/3ML; MG/3ML
3 SOLUTION RESPIRATORY (INHALATION)
Status: COMPLETED | OUTPATIENT
Start: 2018-01-15 | End: 2018-01-17

## 2018-01-15 RX ORDER — HYDROCODONE BITARTRATE AND ACETAMINOPHEN 7.5; 325 MG/15ML; MG/15ML
15 SOLUTION ORAL EVERY 4 HOURS PRN
Status: DISCONTINUED | OUTPATIENT
Start: 2018-01-15 | End: 2018-01-19

## 2018-01-15 RX ORDER — LABETALOL HYDROCHLORIDE 5 MG/ML
10 INJECTION, SOLUTION INTRAVENOUS EVERY 4 HOURS PRN
Status: DISCONTINUED | OUTPATIENT
Start: 2018-01-15 | End: 2018-01-23 | Stop reason: HOSPADM

## 2018-01-15 RX ADMIN — HYDRALAZINE HYDROCHLORIDE 10 MG: 10 TABLET, FILM COATED ORAL at 09:01

## 2018-01-15 RX ADMIN — KETOROLAC TROMETHAMINE 30 MG: 15 INJECTION, SOLUTION INTRAMUSCULAR; INTRAVENOUS at 06:01

## 2018-01-15 RX ADMIN — LABETALOL HYDROCHLORIDE 10 MG: 5 INJECTION, SOLUTION INTRAVENOUS at 01:01

## 2018-01-15 RX ADMIN — HYDRALAZINE HYDROCHLORIDE 10 MG: 10 TABLET, FILM COATED ORAL at 02:01

## 2018-01-15 RX ADMIN — IPRATROPIUM BROMIDE AND ALBUTEROL SULFATE 3 ML: .5; 3 SOLUTION RESPIRATORY (INHALATION) at 07:01

## 2018-01-15 RX ADMIN — HYDROCODONE BITARTRATE AND ACETAMINOPHEN 15 ML: 7.5; 325 SOLUTION ORAL at 11:01

## 2018-01-15 RX ADMIN — FLECAINIDE ACETATE 50 MG: 50 TABLET ORAL at 08:01

## 2018-01-15 RX ADMIN — KETOROLAC TROMETHAMINE 30 MG: 15 INJECTION, SOLUTION INTRAMUSCULAR; INTRAVENOUS at 09:01

## 2018-01-15 RX ADMIN — IRBESARTAN 150 MG: 75 TABLET ORAL at 12:01

## 2018-01-15 RX ADMIN — PANTOPRAZOLE SODIUM 40 MG: 40 INJECTION, POWDER, FOR SOLUTION INTRAVENOUS at 08:01

## 2018-01-15 RX ADMIN — ENOXAPARIN SODIUM 40 MG: 100 INJECTION SUBCUTANEOUS at 05:01

## 2018-01-15 RX ADMIN — IPRATROPIUM BROMIDE AND ALBUTEROL SULFATE 3 ML: .5; 3 SOLUTION RESPIRATORY (INHALATION) at 01:01

## 2018-01-15 RX ADMIN — PIPERACILLIN SODIUM AND TAZOBACTAM SODIUM 4.5 G: 4; .5 INJECTION, POWDER, LYOPHILIZED, FOR SOLUTION INTRAVENOUS at 09:01

## 2018-01-15 RX ADMIN — POTASSIUM PHOSPHATE, MONOBASIC AND POTASSIUM PHOSPHATE, DIBASIC 40 MMOL: 224; 236 INJECTION, SOLUTION INTRAVENOUS at 07:01

## 2018-01-15 RX ADMIN — HYDROCODONE BITARTRATE AND ACETAMINOPHEN 15 ML: 7.5; 325 SOLUTION ORAL at 12:01

## 2018-01-15 RX ADMIN — IRBESARTAN 150 MG: 75 TABLET ORAL at 09:01

## 2018-01-15 RX ADMIN — PIPERACILLIN SODIUM AND TAZOBACTAM SODIUM 4.5 G: 4; .5 INJECTION, POWDER, LYOPHILIZED, FOR SOLUTION INTRAVENOUS at 04:01

## 2018-01-15 RX ADMIN — MINERAL OIL AND WHITE PETROLATUM: 150; 830 OINTMENT OPHTHALMIC at 09:01

## 2018-01-15 RX ADMIN — PIPERACILLIN SODIUM AND TAZOBACTAM SODIUM 4.5 G: 4; .5 INJECTION, POWDER, LYOPHILIZED, FOR SOLUTION INTRAVENOUS at 01:01

## 2018-01-15 RX ADMIN — HYDRALAZINE HYDROCHLORIDE 10 MG: 10 TABLET, FILM COATED ORAL at 05:01

## 2018-01-15 RX ADMIN — FLECAINIDE ACETATE 50 MG: 50 TABLET ORAL at 09:01

## 2018-01-15 RX ADMIN — KETOROLAC TROMETHAMINE 30 MG: 15 INJECTION, SOLUTION INTRAMUSCULAR; INTRAVENOUS at 02:01

## 2018-01-15 RX ADMIN — HYDROCODONE BITARTRATE AND ACETAMINOPHEN 30 ML: 7.5; 325 SOLUTION ORAL at 06:01

## 2018-01-15 RX ADMIN — HYDROCODONE BITARTRATE AND ACETAMINOPHEN 15 ML: 7.5; 325 SOLUTION ORAL at 04:01

## 2018-01-15 RX ADMIN — LABETALOL HYDROCHLORIDE 10 MG: 5 INJECTION, SOLUTION INTRAVENOUS at 12:01

## 2018-01-15 RX ADMIN — DEXTROSE AND SODIUM CHLORIDE: 5; .45 INJECTION, SOLUTION INTRAVENOUS at 08:01

## 2018-01-15 NOTE — PROGRESS NOTES
[facility]  General Surgery  Progress Note    \  Subjective:     History of Present Illness:  No notes on file    Post-Op Info:  Procedure(s) (LRB):  WHIPPLE (N/A)  EXPLORATION-COMMON BILE DUCT (N/A)  PLACEMENT-TUBE-JEJUNOSTOMY (N/A)   3 Days Post-Op     Interval History: POD #3 whipple, common bile duct exploration and j-tube placement.   No acute events overnight. Vitals stable. Afebrile. Reports hiccups and belching. Tolerate sips/chips without nausea or vomiting. No flatus or bowel movement yet. PT did not work with patient yesterday, minimal ambulation.    Medications:  Continuous Infusions:   dextrose 5 % and 0.45 % NaCl 50 mL/hr at 01/14/18 1029     Scheduled Meds:   albuterol-ipratropium 2.5mg-0.5mg/3mL  3 mL Nebulization Q6H WAKE    enoxaparin  40 mg Subcutaneous Daily    flecainide  50 mg Oral BID    hydrALAZINE  10 mg Oral TID    ketorolac  30 mg Intravenous Q8H    pantoprazole  40 mg Intravenous Daily    piperacillin-tazobactam (ZOSYN) IVPB  4.5 g Intravenous Q8H    potassium phosphate IVPB  40 mmol Intravenous Once    white petrolatum-mineral oil   Both Eyes QHS     PRN Meds:ALPRAZolam, diphenhydrAMINE, hydrocodone-apap 7.5-325 MG/15 ML, labetalol     Review of patient's allergies indicates:   Allergen Reactions    Aldoril d30 [methyldopa-hydrochlorothiazide]      Itching eyeball    Flagyl [metronidazole hcl] Other (See Comments)     Yeast infection.    Hydrochlorothiazide      Itching eyeball.    Keflex [cephalexin] Diarrhea    Zithromax [azithromycin] Diarrhea     Objective:     Vital Signs (Most Recent):  Temp: 98.2 °F (36.8 °C) (01/15/18 0423)  Pulse: 65 (01/15/18 0423)  Resp: 19 (01/15/18 0423)  BP: (!) 164/72 (01/15/18 0423)  SpO2: 100 % (01/15/18 0423) Vital Signs (24h Range):  Temp:  [97.7 °F (36.5 °C)-98.3 °F (36.8 °C)] 98.2 °F (36.8 °C)  Pulse:  [] 65  Resp:  [18-24] 19  SpO2:  [94 %-100 %] 100 %  BP: (117-186)/(56-83) 164/72     Weight: 73.5 kg (162  lb)  Body mass index is 28.7 kg/m².    Intake/Output - Last 3 Shifts       01/13 0700 - 01/14 0659 01/14 0700 - 01/15 0659 01/15 0700 - 01/16 0659    P.O. 30 135     I.V. (mL/kg) 3268.8 (44.5) 575.8 (7.8)     NG/GT 80 160     IV Piggyback 2600      Total Intake(mL/kg) 5978.8 (81.3) 870.8 (11.8)     Urine (mL/kg/hr) 885 (0.5) 1800 (1)     Emesis/NG output       Drains 38 (0) 115 (0.1)     Stool       Blood       Total Output 923 1915      Net +5055.8 -1044.2                 Physical Exam   Constitutional: She is oriented to person, place, and time. She appears well-developed and well-nourished. No distress.   HENT:   Head: Normocephalic and atraumatic.   Eyes: EOM are normal. Pupils are equal, round, and reactive to light.   Neck: Normal range of motion. Neck supple.   Cardiovascular: Normal rate, regular rhythm and intact distal pulses.    Pulmonary/Chest: Effort normal. No respiratory distress.   Abdominal: Soft. She exhibits no distension. There is tenderness (at incision site).   Midline incision c/d/i. Jtube in place with no bleeding or oozing from around site. JANET in place.    Musculoskeletal: Normal range of motion.   Neurological: She is alert and oriented to person, place, and time.   Skin: Skin is warm and dry. She is not diaphoretic.     Significant Labs:  CBC:     Recent Labs  Lab 01/15/18  0355   WBC 19.26*   RBC 2.67*   HGB 7.9*   HCT 24.0*   *   MCV 90   MCH 29.6   MCHC 32.9     BMP:     Recent Labs  Lab 01/15/18  0355   GLU 78      K 3.8   *   CO2 18*   BUN 9   CREATININE 0.7   CALCIUM 8.1*   MG 2.1     CMP:     Recent Labs  Lab 01/15/18  0355   GLU 78   CALCIUM 8.1*   ALBUMIN 2.4*   PROT 5.1*      K 3.8   CO2 18*   *   BUN 9   CREATININE 0.7   ALKPHOS 133   ALT 23   AST 28   BILITOT 0.5     LFTs:     Recent Labs  Lab 01/15/18  0355   ALT 23   AST 28   ALKPHOS 133   BILITOT 0.5   PROT 5.1*   ALBUMIN 2.4*     Coagulation: No results for input(s): LABPROT, INR, APTT in the  last 168 hours.      Assessment/Plan:     * Ampullary carcinoma    71 yo male s/p whipple, common bile duct exploration and j-tube placement. On pathway.     -daily labs   -replete lytes as needed  -pulm toilet- IS, duonebs   -PT/OT to work with patient today. Encourage ambulation and OOBTC today   - Strict I/Os   -Keep mIVF @ 50cc/hr   -Sips and chips today.   -Consult dietary for TF recs  -Start TF tonight @ 20cc from 8pto8a  - will trend  -Continue Toradol and hycet through Jtube  -dvt ppx              Niki James MD  General Surgery  [facility]

## 2018-01-15 NOTE — PT/OT/SLP PROGRESS
Physical Therapy Treatment    Patient Name:  Vonnie Lees   MRN:  [id number]    Recommendations:     Discharge Recommendations:  home with home health   Discharge Equipment Recommendations: walker, rolling   Barriers to discharge: None    Assessment:     Vonnie Lees is a 72 y.o. female admitted with a medical diagnosis of Ampullary carcinoma.  She presents with the following impairments/functional limitations:  weakness, impaired endurance, impaired self care skills, gait instability, impaired functional mobilty, pain, impaired balance . Pt Progressing with PT Intervention. Pt with min c/o of fatigue following gait. Pt would continue to benefit from skilled PT to address overall functional mobility and goals. Goals remain appropriate      Rehab Prognosis:  good; patient would benefit from acute skilled PT services to address these deficits and reach maximum level of function.      Recent Surgery: Procedure(s) (LRB):  WHIPPLE (N/A)  EXPLORATION-COMMON BILE DUCT (N/A)  PLACEMENT-TUBE-JEJUNOSTOMY (N/A) 3 Days Post-Op    Plan:     During this hospitalization, patient to be seen 4 x/week to address the above listed problems via gait training, therapeutic activities, therapeutic exercises, neuromuscular re-education  · Plan of Care Expires:  02/13/18   Plan of Care Reviewed with: patient    Subjective     Communicated with RN prior to session.  Patient found supine upon PT entry to room, agreeable to treatment.    I will try and walk  Pain/Comfort:  · Pain Rating 1: 5/10  · Location - Side 1: Bilateral  · Location - Orientation 1: generalized  · Location 1: abdomen  · Pain Addressed 1: Reposition, Distraction  · Pain Rating Post-Intervention 1: 5/10    Patients cultural, spiritual, Rastafarian conflicts given the current situation: none noted    Objective:     Patient found with: peripheral IV, JANET drain, telemetry, PCA, pulse ox (continuous)     General Precautions: Standard, fall   Orthopedic Precautions:N/A    Braces:       Functional Mobility:  · Bed Mobility:     · Rolling Right: contact guard assistance  · Scooting: contact guard assistance  · Supine to Sit: minimum assistance vcs for log rolling technique  · Transfers:     · Sit to Stand:  contact guard assistance with rolling walker vcs for hand palcement  · Gait: pt ambulated 90 ft with Rw with CGA with vcs for upright posture and safety      AM-PAC 6 CLICK MOBILITY  Turning over in bed (including adjusting bedclothes, sheets and blankets)?: 3  Sitting down on and standing up from a chair with arms (e.g., wheelchair, bedside commode, etc.): 3  Moving from lying on back to sitting on the side of the bed?: 3  Moving to and from a bed to a chair (including a wheelchair)?: 3  Need to walk in hospital room?: 3  Climbing 3-5 steps with a railing?: 2  Total Score: 17       Therapeutic Activities and Exercises:   Discussed/educated patient on progress, safety,  d/c, PT POC   White board updated   Donned an extra gown   Pt encouraged to ambulate in hallways 3x/day with nursing or family assistance to improve endurance.   Pt safe to ambulate in hallway with RN or PCT assistance   performance of bilateral ankle pumps to improve BLE circulation and prevent DVT- pt demonstrated understanding by performing 10 reps at bedside in sitting and in supine     Patient left up in chair with all lines intact, call button in reach and nsg notified..    GOALS:    Physical Therapy Goals        Problem: Physical Therapy Goal    Goal Priority Disciplines Outcome Goal Variances Interventions   Physical Therapy Goal     PT/OT, PT Ongoing (interventions implemented as appropriate)     Description:  Goals to be met by: 2018     Patient will increase functional independence with mobility by performin. Supine to sit with Modified [place]-not met  2. Rolling to Right with Modified [place].-not met  3. Sit to stand transfer with Supervision-not met  4. Gait  x 50 feet with  Supervision-not met                       Time Tracking:     PT Received On: 01/15/18  PT Start Time: 1032     PT Stop Time: 1056  PT Total Time (min): 24 min     Billable Minutes: Gait Training 14 and Therapeutic Activity 10    Treatment Type: Treatment  PT/PTA: PTA     PTA Visit Number: 1     Wilian Ugalde, PTA  01/15/2018

## 2018-01-15 NOTE — PLAN OF CARE
[facility]       HOME  HEALTH ORDERS                                    FACE TO FACE ENCOUNTER      Patient Name: Vonnie Lees  YOB: 1945    PCP: Brenton Castro MD   PCP Address: [address] [facility] / [place]*  PCP Phone Number: [phone]  PCP Fax: [phone]    Encounter Date: 1/23/2018  Admit to Home Health    Diagnoses:  Active Hospital Problems    Diagnosis  POA    *Ampullary carcinoma [C24.1]  Yes      Resolved Hospital Problems    Diagnosis Date Resolved POA   No resolved problems to display.     I have seen and examined this patient face to face today. My clinical findings that support the need for the home health skilled services and home bound status are the following:  Weakness/numbness causing balance and gait disturbance due to Surgery making it taxing to leave home.    Allergies:  Review of patient's allergies indicates:   Allergen Reactions    Aldoril d30 [methyldopa-hydrochlorothiazide]      Itching eyeball    Flagyl [metronidazole hcl] Other (See Comments)     Yeast infection.    Hydrochlorothiazide      Itching eyeball.    Keflex [cephalexin] Diarrhea    Zithromax [azithromycin] Diarrhea       Diet: Regular    Activities: activity as tolerated    Nursing:   SN to complete comprehensive assessment including routine vital signs. Instruct on disease process and s/s of complications to report to MD. Review/verify medication list sent home with the patient at time of discharge  and instruct patient/caregiver as needed. Frequency may be adjusted depending on start of care date.    Notify MD if SBP > 160 or < 90; DBP > 90 or < 50; HR > 120 or < 50; Temp > 101       CONSULTS:    Physical Therapy to evaluate and treat. Evaluate for home safety and equipment needs; Establish/upgrade home exercise program. Perform / instruct on therapeutic exercises, gait training, transfer training, and  Range of Motion.  Occupational Therapy to evaluate and treat. Evaluate home environment for safety and equipment needs. Perform/Instruct on transfers, ADL training, ROM, and therapeutic exercises.   to evaluate for community resources/long-range planning.  Aide to provide assistance with personal care, ADLs, and vital signs.    Medications: Review discharge medications with patient and family and provide education.      Current Discharge Medication List      START taking these medications    Details   enoxaparin (LOVENOX) 40 mg/0.4 mL Syrg Inject 0.4 mLs (40 mg total) into the skin once daily.  Qty: 5.6 mL, Refills: 0    Comments: Please deliver to room 645a      hydrocodone-acetaminophen (HYCET) solution 7.5-325 mg/15mL 15 mLs by Per J Tube route every 6 (six) hours as needed.  Qty: 473 mL, Refills: 0      metoclopramide HCl (REGLAN) 10 MG tablet Take 1 tablet (10 mg total) by mouth 3 (three) times daily before meals.  Qty: 15 tablet, Refills: 0      psyllium husk, aspartame, 3.4 gram PwPk Take 1 packet by mouth once daily. May take over the counter.  Qty: 10 packet, Refills: 0         CONTINUE these medications which have NOT CHANGED    Details   ALPRAZolam (XANAX) 0.5 MG tablet Take 0.5 mg by mouth 3 (three) times daily as needed for Anxiety.      cyclobenzaprine (FLEXERIL) 10 MG tablet Take 10 mg by mouth 3 (three) times daily as needed for Muscle spasms.      diphenhydrAMINE (BENADRYL) 25 mg capsule Take 25 mg by mouth every 6 (six) hours as needed for Allergies.      flecainide (TAMBOCOR) 50 MG Tab Take 50 mg by mouth 2 (two) times daily.       furosemide (LASIX) 20 MG tablet Take 20 mg by mouth as needed.       hydrALAZINE (APRESOLINE) 10 MG tablet Take 10 mg by mouth 3 (three) times daily.      irbesartan (AVAPRO) 300 MG tablet Take 150 mg by mouth 2 (two) times daily.       lipase-protease-amylase 24,000-76,000-120,000 units (CREON) 24,000-76,000 -120,000 unit capsule Take 2 capsules by mouth 3  (three) times daily with meals. Take 1 capsule w snacks  Qty: 240 capsule, Refills: 0      magnesium 250 mg Tab Take 1 tablet by mouth 2 (two) times daily.       omeprazole (PRILOSEC) 40 MG capsule Take 40 mg by mouth once daily.      verapamil (CALAN) 80 MG tablet Take 80 mg by mouth once daily. Takes in the afternoon      aspirin (ECOTRIN) 81 MG EC tablet Take 81 mg by mouth once daily.      estradiol (ESTRACE) 0.5 MG tablet Take 0.5 mg by mouth once daily.             Disciplines requested: Nursing Services to provide:   Other: S/P Whipple   Flush jejunostomy tube with 20cc water Q 8 hours   Empty gaviota drain every 12 hours and record output   Open gastrostomy tube for nausea or vomiting   Free water flushes of 200cc via jejunostomy tube TID while awake and free water infusion @ 30cc/hour while tube feeds are infusing.     Labs: CBC, CMP, Mag, Phos Q Monday x 2 weeks; pre-albumin x 1 on Monday CBC, BMP Q Thursday x 2 weeks.  If it is a holiday or the patient is not available for labs then they can be drawn the next day.     Monitor abdomen for redness, oozing from staple site, abdominal distension, or pain not controlled by pain medication.     TUBE FEEDS via jejunostomy tube: Peptamen 1.5 (or equivalent) @ 70cc/hour from 8p-8a.      Vitals signs daily. Call MD with Temperature > 101, SBP > 180, HR < 50.   Physician to follow patient's care (the person listed here will be responsible for signing ongoing orders): Other: Dr. SARAVANAN Montague, Dr. MARTIN Rangel [phone] office [phone] fax Requested Start of Care Date: 1/15/2018    I certify that this patient is confined to her home and needs intermittent skilled nursing care, physical therapy and occupational therapy.    Electronically Signed  _________________________________  Jenn Durand NP  1/23/2018

## 2018-01-15 NOTE — PLAN OF CARE
Problem: Physical Therapy Goal  Goal: Physical Therapy Goal  Goals to be met by: 2018     Patient will increase functional independence with mobility by performin. Supine to sit with Modified [place]-not met  2. Rolling to Right with Modified [place].-not met  3. Sit to stand transfer with Supervision-not met  4. Gait  x 50 feet with Supervision-not met     Pt progressing towards goals. continue with PT POC.Goals remain appropriate.  Wilian Ugalde PTA  1/15/2018

## 2018-01-15 NOTE — PLAN OF CARE
Problem: Patient Care Overview  Goal: Plan of Care Review  Outcome: Ongoing (interventions implemented as appropriate)  Patient is awake and oriented. Complains of pain and weakness. Prn Lortab elixir for pain  Elevated BP overnight. IV labetalol administered. Telemetry monitoring. J- tube clamped and flushed q8hrs. Mitchell drain to abdomen. Free of injuries and falls. Voids with bedside commode. Plan of care discussed with patient and daughter. Verbalized understanding. Nurse will continue to monitor.

## 2018-01-15 NOTE — CONSULTS
[facility]  Adult Nutrition  Consult Note    RD received consult regarding TF starting s/p whipple. RD completed full assessment 1/13, recommendations copied below.     Recommendations  1. Once medically able, initiate j-tube feeds of Isosource 1.5 at 100 mL/hr 8p-8a.               -Will provide 1800kcal, 81g protein, and 917mL fluid.   2. Once appropriate, initiate clear liquids and ADAT to Regular diet.   RD will continue to monitor and follow-up as previously scheduled.

## 2018-01-15 NOTE — PLAN OF CARE
Patient is a 72 year old female admitted from home and underwent Whipple, J tube and gaviota drain placement 1/12/2018.  Patient is expected to discharge home with home health and tube feeds +/- 1/19/2018.    Patient lives in a one story home with her , Avila, who will drive her home, care for her and obtain anything she needs after discharge.  Initial education on Whipple booklet, activity, nutrition, IS and pain control done and patient given [facility] with understanding verbalized.  Will continue to follow for needs.    PCP  Brenton Castro MD  [address] [facility] / [place]*  [phone]      [facility] [address] [address]  Phone: [phone] Fax: [phone]    [facility] [address] [address]  Phone: [phone] Fax: [phone]      Extended Emergency Contact Information  Primary Emergency Contact: LeesAvila   [facility]  Home Phone: [phone]  Mobile Phone: [phone]  Relation: Spouse  Secondary Emergency Contact: NabeelLazaro   [place]  Mobile Phone: [phone]  Relation: Son       01/15/18 1401   Discharge Assessment   Assessment Type Discharge Planning Reassessment   Confirmed/corrected address and phone number on facesheet? Yes   Assessment information obtained from? Patient;Medical Record   Expected Length of Stay (days) 7   Communicated expected length of stay with patient/caregiver yes   Prior to hospitilization cognitive status: Alert/Oriented   Prior to hospitalization functional status: Independent   Current cognitive status: Alert/Oriented   Current Functional Status: Independent;Assistive Equipment;Needs Assistance   Lives With spouse   Able to Return to Prior Arrangements yes   Is patient able to care for self after  discharge? Yes   Who are your caregiver(s) and their phone number(s)?    Patient's perception of discharge disposition home or selfcare;home health   Equipment Currently Used at Home none   Do you have any problems affording any of your prescribed medications? No   Is the patient taking medications as prescribed? yes   Does the patient have transportation home? Yes   Transportation Available family or friend will provide   Discharge Plan A Home with family;Home Health   Discharge Plan B Home with family   Patient/Family In Agreement With Plan yes

## 2018-01-15 NOTE — PLAN OF CARE
Problem: Patient Care Overview  Goal: Plan of Care Review  Outcome: Ongoing (interventions implemented as appropriate)  Plan of care discussed with pt. Pt verbalizes understanding. Pt remained NPO except for sips with meds and some ice. Pain managed with prn hydrocodone and scheduled toradol. Pt on telemetry, normal sinus rhythm. Elevated BP this shift. WNL now after IV labetalol given. All other VSS on 1L NC. Pt ambulated in lowery once this shift. Pt up in chair for 1 hour this shift. Pt up to bedside commode with 1 person assist. J tube intact. JANET drain intact, serosang drainage. Pt remains free of falls. Will continue to monitor.

## 2018-01-15 NOTE — ASSESSMENT & PLAN NOTE
71 yo male s/p whipple, common bile duct exploration and j-tube placement. On pathway.     -daily labs   -replete lytes as needed  -pulm toilet- IS, ramses   -PT/OT to work with patient today. Encourage ambulation and OOBTC today   - Strict I/Os   -Keep mIVF @ 50cc/hr   -Sips and chips today.   -Consult dietary for TF recs  -Start TF tonight @ 20cc from 8pto8a  - will trend  -Continue Toradol and hycet through Jtube  -dvt ppx

## 2018-01-15 NOTE — SUBJECTIVE & OBJECTIVE
Interval History: POD #3 whipple, common bile duct exploration and j-tube placement.   No acute events overnight. Vitals stable. Afebrile. Reports hiccups and belching. Tolerate sips/chips without nausea or vomiting. No flatus or bowel movement yet. PT did not work with patient yesterday, minimal ambulation.    Medications:  Continuous Infusions:   dextrose 5 % and 0.45 % NaCl 50 mL/hr at 01/14/18 1029     Scheduled Meds:   albuterol-ipratropium 2.5mg-0.5mg/3mL  3 mL Nebulization Q6H WAKE    enoxaparin  40 mg Subcutaneous Daily    flecainide  50 mg Oral BID    hydrALAZINE  10 mg Oral TID    ketorolac  30 mg Intravenous Q8H    pantoprazole  40 mg Intravenous Daily    piperacillin-tazobactam (ZOSYN) IVPB  4.5 g Intravenous Q8H    potassium phosphate IVPB  40 mmol Intravenous Once    white petrolatum-mineral oil   Both Eyes QHS     PRN Meds:ALPRAZolam, diphenhydrAMINE, hydrocodone-apap 7.5-325 MG/15 ML, labetalol     Review of patient's allergies indicates:   Allergen Reactions    Aldoril d30 [methyldopa-hydrochlorothiazide]      Itching eyeball    Flagyl [metronidazole hcl] Other (See Comments)     Yeast infection.    Hydrochlorothiazide      Itching eyeball.    Keflex [cephalexin] Diarrhea    Zithromax [azithromycin] Diarrhea     Objective:     Vital Signs (Most Recent):  Temp: 98.2 °F (36.8 °C) (01/15/18 0423)  Pulse: 65 (01/15/18 0423)  Resp: 19 (01/15/18 0423)  BP: (!) 164/72 (01/15/18 0423)  SpO2: 100 % (01/15/18 0423) Vital Signs (24h Range):  Temp:  [97.7 °F (36.5 °C)-98.3 °F (36.8 °C)] 98.2 °F (36.8 °C)  Pulse:  [] 65  Resp:  [18-24] 19  SpO2:  [94 %-100 %] 100 %  BP: (117-186)/(56-83) 164/72     Weight: 73.5 kg (162 lb)  Body mass index is 28.7 kg/m².    Intake/Output - Last 3 Shifts       01/13 0700 - 01/14 0659 01/14 0700 - 01/15 0659 01/15 0700 - 01/16 0659    P.O. 30 135     I.V. (mL/kg) 3268.8 (44.5) 575.8 (7.8)     NG/GT 80 160     IV Piggyback 2600      Total Intake(mL/kg) 5978.8  (81.3) 870.8 (11.8)     Urine (mL/kg/hr) 885 (0.5) 1800 (1)     Emesis/NG output       Drains 38 (0) 115 (0.1)     Stool       Blood       Total Output 923 1915      Net +5055.8 -1044.2                 Physical Exam   Constitutional: She is oriented to person, place, and time. She appears well-developed and well-nourished. No distress.   HENT:   Head: Normocephalic and atraumatic.   Eyes: EOM are normal. Pupils are equal, round, and reactive to light.   Neck: Normal range of motion. Neck supple.   Cardiovascular: Normal rate, regular rhythm and intact distal pulses.    Pulmonary/Chest: Effort normal. No respiratory distress.   Abdominal: Soft. She exhibits no distension. There is tenderness (at incision site).   Midline incision c/d/i. Jtube in place with no bleeding or oozing from around site. JANET in place.    Musculoskeletal: Normal range of motion.   Neurological: She is alert and oriented to person, place, and time.   Skin: Skin is warm and dry. She is not diaphoretic.     Significant Labs:  CBC:     Recent Labs  Lab 01/15/18  0355   WBC 19.26*   RBC 2.67*   HGB 7.9*   HCT 24.0*   *   MCV 90   MCH 29.6   MCHC 32.9     BMP:     Recent Labs  Lab 01/15/18  0355   GLU 78      K 3.8   *   CO2 18*   BUN 9   CREATININE 0.7   CALCIUM 8.1*   MG 2.1     CMP:     Recent Labs  Lab 01/15/18  0355   GLU 78   CALCIUM 8.1*   ALBUMIN 2.4*   PROT 5.1*      K 3.8   CO2 18*   *   BUN 9   CREATININE 0.7   ALKPHOS 133   ALT 23   AST 28   BILITOT 0.5     LFTs:     Recent Labs  Lab 01/15/18  0355   ALT 23   AST 28   ALKPHOS 133   BILITOT 0.5   PROT 5.1*   ALBUMIN 2.4*     Coagulation: No results for input(s): LABPROT, INR, APTT in the last 168 hours.

## 2018-01-16 LAB
ALBUMIN SERPL BCP-MCNC: 2.2 G/DL
ALP SERPL-CCNC: 105 U/L
ALT SERPL W/O P-5'-P-CCNC: 18 U/L
ANION GAP SERPL CALC-SCNC: 8 MMOL/L
AST SERPL-CCNC: 17 U/L
BACTERIA SPEC AEROBE CULT: NORMAL
BASOPHILS # BLD AUTO: 0.03 K/UL
BASOPHILS NFR BLD: 0.3 %
BILIRUB SERPL-MCNC: 0.5 MG/DL
BLD PROD TYP BPU: NORMAL
BLOOD UNIT EXPIRATION DATE: NORMAL
BLOOD UNIT TYPE CODE: 600
BLOOD UNIT TYPE: NORMAL
BUN SERPL-MCNC: 8 MG/DL
CA-I BLDV-SCNC: 1.07 MMOL/L
CALCIUM SERPL-MCNC: 7.9 MG/DL
CHLORIDE SERPL-SCNC: 112 MMOL/L
CO2 SERPL-SCNC: 20 MMOL/L
CODING SYSTEM: NORMAL
CREAT SERPL-MCNC: 0.7 MG/DL
DIFFERENTIAL METHOD: ABNORMAL
DISPENSE STATUS: NORMAL
EOSINOPHIL # BLD AUTO: 0.5 K/UL
EOSINOPHIL NFR BLD: 4.7 %
ERYTHROCYTE [DISTWIDTH] IN BLOOD BY AUTOMATED COUNT: 14.4 %
EST. GFR  (AFRICAN AMERICAN): >60 ML/MIN/1.73 M^2
EST. GFR  (NON AFRICAN AMERICAN): >60 ML/MIN/1.73 M^2
GLUCOSE SERPL-MCNC: 100 MG/DL
HCT VFR BLD AUTO: 23 %
HGB BLD-MCNC: 7.5 G/DL
IMM GRANULOCYTES # BLD AUTO: 0.07 K/UL
IMM GRANULOCYTES NFR BLD AUTO: 0.6 %
LYMPHOCYTES # BLD AUTO: 1.2 K/UL
LYMPHOCYTES NFR BLD: 10.2 %
MAGNESIUM SERPL-MCNC: 1.9 MG/DL
MCH RBC QN AUTO: 29.4 PG
MCHC RBC AUTO-ENTMCNC: 32.6 G/DL
MCV RBC AUTO: 90 FL
MONOCYTES # BLD AUTO: 0.6 K/UL
MONOCYTES NFR BLD: 5.6 %
NEUTROPHILS # BLD AUTO: 8.9 K/UL
NEUTROPHILS NFR BLD: 78.6 %
NRBC BLD-RTO: 0 /100 WBC
NUM UNITS TRANS PACKED RBC: NORMAL
NUM UNITS TRANS PACKED RBC: NORMAL
PHOSPHATE SERPL-MCNC: 2.6 MG/DL
PLATELET # BLD AUTO: 152 K/UL
PMV BLD AUTO: 10.6 FL
POTASSIUM SERPL-SCNC: 3.2 MMOL/L
PROT SERPL-MCNC: 5 G/DL
RBC # BLD AUTO: 2.55 M/UL
SODIUM SERPL-SCNC: 140 MMOL/L
TRANS ERYTHROCYTES VOL PATIENT: NORMAL ML
TRANS ERYTHROCYTES VOL PATIENT: NORMAL ML
WBC # BLD AUTO: 11.33 K/UL

## 2018-01-16 PROCEDURE — 20600001 HC STEP DOWN PRIVATE ROOM

## 2018-01-16 PROCEDURE — 80053 COMPREHEN METABOLIC PANEL: CPT

## 2018-01-16 PROCEDURE — 27000221 HC OXYGEN, UP TO 24 HOURS

## 2018-01-16 PROCEDURE — 94761 N-INVAS EAR/PLS OXIMETRY MLT: CPT

## 2018-01-16 PROCEDURE — 36415 COLL VENOUS BLD VENIPUNCTURE: CPT

## 2018-01-16 PROCEDURE — 99900035 HC TECH TIME PER 15 MIN (STAT)

## 2018-01-16 PROCEDURE — 63600175 PHARM REV CODE 636 W HCPCS: Performed by: SURGERY

## 2018-01-16 PROCEDURE — 63600175 PHARM REV CODE 636 W HCPCS: Performed by: STUDENT IN AN ORGANIZED HEALTH CARE EDUCATION/TRAINING PROGRAM

## 2018-01-16 PROCEDURE — 94799 UNLISTED PULMONARY SVC/PX: CPT

## 2018-01-16 PROCEDURE — 25000003 PHARM REV CODE 250: Performed by: STUDENT IN AN ORGANIZED HEALTH CARE EDUCATION/TRAINING PROGRAM

## 2018-01-16 PROCEDURE — 25000242 PHARM REV CODE 250 ALT 637 W/ HCPCS: Performed by: NURSE PRACTITIONER

## 2018-01-16 PROCEDURE — 83735 ASSAY OF MAGNESIUM: CPT

## 2018-01-16 PROCEDURE — 25000003 PHARM REV CODE 250: Performed by: SURGERY

## 2018-01-16 PROCEDURE — 94640 AIRWAY INHALATION TREATMENT: CPT

## 2018-01-16 PROCEDURE — 63600175 PHARM REV CODE 636 W HCPCS: Performed by: NURSE PRACTITIONER

## 2018-01-16 PROCEDURE — 82330 ASSAY OF CALCIUM: CPT

## 2018-01-16 PROCEDURE — 25000003 PHARM REV CODE 250: Performed by: NURSE PRACTITIONER

## 2018-01-16 PROCEDURE — C9113 INJ PANTOPRAZOLE SODIUM, VIA: HCPCS | Performed by: SURGERY

## 2018-01-16 PROCEDURE — 84100 ASSAY OF PHOSPHORUS: CPT

## 2018-01-16 PROCEDURE — 85025 COMPLETE CBC W/AUTO DIFF WBC: CPT

## 2018-01-16 RX ORDER — BISACODYL 10 MG
10 SUPPOSITORY, RECTAL RECTAL DAILY
Status: DISCONTINUED | OUTPATIENT
Start: 2018-01-16 | End: 2018-01-23

## 2018-01-16 RX ORDER — FUROSEMIDE 10 MG/ML
40 INJECTION INTRAMUSCULAR; INTRAVENOUS ONCE
Status: COMPLETED | OUTPATIENT
Start: 2018-01-16 | End: 2018-01-16

## 2018-01-16 RX ORDER — MAGNESIUM SULFATE HEPTAHYDRATE 40 MG/ML
2 INJECTION, SOLUTION INTRAVENOUS ONCE
Status: COMPLETED | OUTPATIENT
Start: 2018-01-16 | End: 2018-01-16

## 2018-01-16 RX ORDER — POTASSIUM CHLORIDE 20 MEQ/15ML
40 SOLUTION ORAL ONCE
Status: COMPLETED | OUTPATIENT
Start: 2018-01-16 | End: 2018-01-16

## 2018-01-16 RX ADMIN — LABETALOL HYDROCHLORIDE 10 MG: 5 INJECTION, SOLUTION INTRAVENOUS at 08:01

## 2018-01-16 RX ADMIN — FLECAINIDE ACETATE 50 MG: 50 TABLET ORAL at 08:01

## 2018-01-16 RX ADMIN — HYDROCODONE BITARTRATE AND ACETAMINOPHEN 30 ML: 7.5; 325 SOLUTION ORAL at 10:01

## 2018-01-16 RX ADMIN — HYDRALAZINE HYDROCHLORIDE 10 MG: 20 INJECTION INTRAMUSCULAR; INTRAVENOUS at 11:01

## 2018-01-16 RX ADMIN — IRBESARTAN 150 MG: 75 TABLET ORAL at 09:01

## 2018-01-16 RX ADMIN — MINERAL OIL AND WHITE PETROLATUM: 150; 830 OINTMENT OPHTHALMIC at 09:01

## 2018-01-16 RX ADMIN — HYDRALAZINE HYDROCHLORIDE 10 MG: 10 TABLET, FILM COATED ORAL at 05:01

## 2018-01-16 RX ADMIN — IPRATROPIUM BROMIDE AND ALBUTEROL SULFATE 3 ML: .5; 3 SOLUTION RESPIRATORY (INHALATION) at 08:01

## 2018-01-16 RX ADMIN — HYDRALAZINE HYDROCHLORIDE 10 MG: 10 TABLET, FILM COATED ORAL at 01:01

## 2018-01-16 RX ADMIN — KETOROLAC TROMETHAMINE 30 MG: 15 INJECTION, SOLUTION INTRAMUSCULAR; INTRAVENOUS at 01:01

## 2018-01-16 RX ADMIN — IRBESARTAN 150 MG: 75 TABLET ORAL at 08:01

## 2018-01-16 RX ADMIN — POTASSIUM CHLORIDE 40 MEQ: 20 SOLUTION ORAL at 09:01

## 2018-01-16 RX ADMIN — MAGNESIUM SULFATE IN WATER 2 G: 40 INJECTION, SOLUTION INTRAVENOUS at 01:01

## 2018-01-16 RX ADMIN — BISACODYL 10 MG: 10 SUPPOSITORY RECTAL at 08:01

## 2018-01-16 RX ADMIN — IPRATROPIUM BROMIDE AND ALBUTEROL SULFATE 3 ML: .5; 3 SOLUTION RESPIRATORY (INHALATION) at 07:01

## 2018-01-16 RX ADMIN — ENOXAPARIN SODIUM 40 MG: 100 INJECTION SUBCUTANEOUS at 04:01

## 2018-01-16 RX ADMIN — ESTRADIOL 0.5 MG: 0.5 TABLET ORAL at 08:01

## 2018-01-16 RX ADMIN — HYDRALAZINE HYDROCHLORIDE 10 MG: 10 TABLET, FILM COATED ORAL at 09:01

## 2018-01-16 RX ADMIN — POTASSIUM PHOSPHATE, MONOBASIC AND POTASSIUM PHOSPHATE, DIBASIC 30 MMOL: 224; 236 INJECTION, SOLUTION INTRAVENOUS at 09:01

## 2018-01-16 RX ADMIN — KETOROLAC TROMETHAMINE 30 MG: 15 INJECTION, SOLUTION INTRAMUSCULAR; INTRAVENOUS at 09:01

## 2018-01-16 RX ADMIN — KETOROLAC TROMETHAMINE 30 MG: 15 INJECTION, SOLUTION INTRAMUSCULAR; INTRAVENOUS at 05:01

## 2018-01-16 RX ADMIN — PANTOPRAZOLE SODIUM 40 MG: 40 INJECTION, POWDER, FOR SOLUTION INTRAVENOUS at 08:01

## 2018-01-16 RX ADMIN — HYDROCODONE BITARTRATE AND ACETAMINOPHEN 30 ML: 7.5; 325 SOLUTION ORAL at 04:01

## 2018-01-16 RX ADMIN — FUROSEMIDE 40 MG: 10 INJECTION, SOLUTION INTRAMUSCULAR; INTRAVENOUS at 09:01

## 2018-01-16 RX ADMIN — PIPERACILLIN SODIUM AND TAZOBACTAM SODIUM 4.5 G: 4; .5 INJECTION, POWDER, LYOPHILIZED, FOR SOLUTION INTRAVENOUS at 09:01

## 2018-01-16 RX ADMIN — IPRATROPIUM BROMIDE AND ALBUTEROL SULFATE 3 ML: .5; 3 SOLUTION RESPIRATORY (INHALATION) at 01:01

## 2018-01-16 RX ADMIN — PIPERACILLIN SODIUM AND TAZOBACTAM SODIUM 4.5 G: 4; .5 INJECTION, POWDER, LYOPHILIZED, FOR SOLUTION INTRAVENOUS at 02:01

## 2018-01-16 RX ADMIN — PIPERACILLIN SODIUM AND TAZOBACTAM SODIUM 4.5 G: 4; .5 INJECTION, POWDER, LYOPHILIZED, FOR SOLUTION INTRAVENOUS at 05:01

## 2018-01-16 RX ADMIN — FLECAINIDE ACETATE 50 MG: 50 TABLET ORAL at 09:01

## 2018-01-16 RX ADMIN — HYDROCODONE BITARTRATE AND ACETAMINOPHEN 30 ML: 7.5; 325 SOLUTION ORAL at 03:01

## 2018-01-16 NOTE — PHYSICIAN QUERY
PT Name: Vonnie Lees  MR #: [id number]     Physician Query Form - Documentation Clarification      Nazanin Hong RN, CCDS  Contact Info: [phone] [email or web address]      This form is a permanent document in the medical record.     Query Date: January 16, 2018    By submitting this query, we are merely seeking further clarification of documentation. Please utilize your independent clinical judgment when addressing the question(s) below.    The Medical record reflects the following:    Supporting Clinical Findings Location in Medical Record   Interval History: POD #2 whipple, common bile duct exploration and j-tube placement.     Reports sensation of throat swelling overnight, relieved with benadryl administration. No trouble breathing.     O2 sats upper 90s. Reports increased abdominal pain at incision site not fully relieved with the PCA.    Jacob PN 1/14   diphenhydrAMINE injection 25 mg Given: 1/13/2018     Inject 0.5 mLs (25 mg total) into the vein every 6 (six) hours as needed (throat discomfort). - Intravenous      MAR   Hydromorphone IV PCA  MAR                                                                              Doctor, Please specify diagnosis or diagnoses associated with above clinical findings.    Provider Use Only    (  ) Adverse Reaction to PCA meds - hydromorphone    (  ) Adverse Reaction to other meds - specify meds: ________________    ( x ) No adverse reaction.  Throat discomfort only.     (  ) Other - specify: ________________                                                                                                               [  ] Clinically undetermined

## 2018-01-16 NOTE — PHYSICIAN QUERY
"PT Name: Vonnie Lees  MR #: [id number]    Physician Query Form - Hematology Clarification      Nazanin Hong RN, CCDS  Contact Info: [phone] [email or web address]      This form is a permanent document in the medical record.      Query Date: January 16, 2018    By submitting this query, we are merely seeking further clarification of documentation. Please utilize your independent clinical judgment when addressing the question(s) below.    The Medical record contains the following:   Indicators  Supporting Clinical Findings Location in Medical Record    "Anemia" documented     x H & H = On 1/12 = 10.2/31.1   On 1/13 =   8.3/25.3  On 1/16 =   7.5/23.0 Lab    BP =                     HR=      "GI bleeding" documented      Acute bleeding (Non GI site)      Transfusion(s)     x Treatment: CBC daily  Orders 1/12   x Other:  Procedure:  1/12 WHIPPLE; open exploration cbd; Jejunostomy tube placement  Ebl: 500 ml     On release of the retractor we noted some bleeding from the left colon mesentery that required suture ligation.       A separate sterile setup was used for closure.  Gowns and gloves were changed.  The abdomen was closed over a Homer visceral retractor with loop #1 PDS suture.  Skin was closed with monocryl.        Op Note 1/12     Provider, please specify diagnosis or diagnoses associated with above clinical findings.    [ x ] Acute blood loss anemia expected post-operatively    [  ] Acute blood loss anemia    [  ] Precipitous drop in Hematocrit    [  ] Other Hematological Diagnosis (please specify): _________________________________    [  ] Clinically Undetermined       Please document in your progress notes daily for the duration of treatment, until resolved, and include in your discharge summary.                                                                                                      "

## 2018-01-16 NOTE — PLAN OF CARE
Problem: Patient Care Overview  Goal: Plan of Care Review  Outcome: Ongoing (interventions implemented as appropriate)  Pt remained stable with no acute events overnight. Pain appears to be under control with the current PRN on the MAR. O2 Sats remained WNL. No cardiac events noted this shift. JANET drain is still putting out some serosanguinous fluid. J-tube was connected to ordered TF this shift for pt to begin infusing at 20cc overnight.  No complications noted with the TF. Pt continues to get up to the BSC and chair with minimal assist. NAD noted. VSS. Will continue to monitor.

## 2018-01-16 NOTE — PROGRESS NOTES
[facility]  General Surgery  Progress Note    Subjective:     History of Present Illness:  No notes on file    Post-Op Info:  Procedure(s) (LRB):  WHIPPLE (N/A)  EXPLORATION-COMMON BILE DUCT (N/A)  PLACEMENT-TUBE-JEJUNOSTOMY (N/A)   4 Days Post-Op     Interval History: POD #4 whipple, common bile duct exploration and j-tube placement.   No acute events overnight. Vitals stable. Afebrile. Reports feeling much better. Started tube feeds overnight and she reports feeling full. Tolerate sips/chips without nausea or vomiting. No flatus or bowel movement yet.     Medications:  Continuous Infusions:   dextrose 5 % and 0.45 % NaCl 50 mL/hr at 01/15/18 0836     Scheduled Meds:   albuterol-ipratropium 2.5mg-0.5mg/3mL  3 mL Nebulization Q6H WAKE    enoxaparin  40 mg Subcutaneous Daily    estradiol  0.5 mg Oral Daily    flecainide  50 mg Oral BID    hydrALAZINE  10 mg Oral TID    irbesartan  150 mg Oral BID    ketorolac  30 mg Intravenous Q8H    pantoprazole  40 mg Intravenous Daily    piperacillin-tazobactam (ZOSYN) IVPB  4.5 g Intravenous Q8H    white petrolatum-mineral oil   Both Eyes QHS     PRN Meds:ALPRAZolam, diphenhydrAMINE, hydrALAZINE, hydrocodone-apap 7.5-325 MG/15 ML, hydrocodone-apap 7.5-325 MG/15 ML, labetalol     Review of patient's allergies indicates:   Allergen Reactions    Aldoril d30 [methyldopa-hydrochlorothiazide]      Itching eyeball    Flagyl [metronidazole hcl] Other (See Comments)     Yeast infection.    Hydrochlorothiazide      Itching eyeball.    Keflex [cephalexin] Diarrhea    Zithromax [azithromycin] Diarrhea     Objective:     Vital Signs (Most Recent):  Temp: 98.2 °F (36.8 °C) (01/16/18 0421)  Pulse: 68 (01/16/18 0715)  Resp: 18 (01/16/18 0715)  BP: 136/66 (01/16/18 0421)  SpO2: 97 % (01/16/18 0715) Vital Signs (24h Range):  Temp:  [97.5 °F (36.4 °C)-98.6 °F (37 °C)] 98.2 °F (36.8 °C)  Pulse:  [62-93] 68  Resp:  [16-24] 18  SpO2:  [94 %-99 %] 97 %  BP:  (136-195)/(65-93) 136/66     Weight: 73.5 kg (162 lb)  Body mass index is 28.7 kg/m².    Intake/Output - Last 3 Shifts       01/14 0700 - 01/15 0659 01/15 0700 - 01/16 0659 01/16 0700 - 01/17 0659    P.O. 210 260     I.V. (mL/kg) 875.8 (11.9) 1200 (16.3)     NG/ 295     IV Piggyback 300 300     Total Intake(mL/kg) 1545.8 (21) 2055 (28)     Urine (mL/kg/hr) 1800 (1) 2700 (1.5)     Drains 115 (0.1) 82 (0)     Total Output 1915 2782      Net -369.2 -727                 Physical Exam   Constitutional: She is oriented to person, place, and time. She appears well-developed and well-nourished. No distress.   HENT:   Head: Normocephalic and atraumatic.   Eyes: EOM are normal. Pupils are equal, round, and reactive to light.   Neck: Normal range of motion. Neck supple.   Cardiovascular: Normal rate, regular rhythm and intact distal pulses.    Pulmonary/Chest: Effort normal. No respiratory distress.   Abdominal: Soft. She exhibits no distension. There is tenderness (at incision site).   Midline incision c/d/i. Jtube in place with no bleeding or oozing from around site. JANET in place.    Musculoskeletal: Normal range of motion.   Neurological: She is alert and oriented to person, place, and time.   Skin: Skin is warm and dry. She is not diaphoretic.     Significant Labs:  CBC:     Recent Labs  Lab 01/16/18  0529   WBC 11.33   RBC 2.55*   HGB 7.5*   HCT 23.0*      MCV 90   MCH 29.4   MCHC 32.6     BMP:     Recent Labs  Lab 01/16/18  0529         K 3.2*   *   CO2 20*   BUN 8   CREATININE 0.7   CALCIUM 7.9*   MG 1.9     CMP:     Recent Labs  Lab 01/16/18  0529      CALCIUM 7.9*   ALBUMIN 2.2*   PROT 5.0*      K 3.2*   CO2 20*   *   BUN 8   CREATININE 0.7   ALKPHOS 105   ALT 18   AST 17   BILITOT 0.5     LFTs:     Recent Labs  Lab 01/16/18  0529   ALT 18   AST 17   ALKPHOS 105   BILITOT 0.5   PROT 5.0*   ALBUMIN 2.2*     Coagulation: No results for input(s): LABPROT, INR, APTT in the  last 168 hours.      Assessment/Plan:     * Ampullary carcinoma    73 yo male s/p whipple, common bile duct exploration and j-tube placement. On pathway.     -QOD labs   -replete lytes as needed  -pulm toilet- IS, duonebs   -PT/OT. Encourage ambulation and OOBTC today   -Strict I/Os   -Lasix 40 mg today- moderately edematous, +8L for admission  -CLD   -TF tonight increase to 30cc from 8pto8a  - will trend  -Continue Toradol and hycet through Jtube  -dvt ppx              Niki James MD  General Surgery  [facility]

## 2018-01-16 NOTE — PLAN OF CARE
Problem: Patient Care Overview  Goal: Plan of Care Review  Outcome: Ongoing (interventions implemented as appropriate)  Plan of care reviewed with patient and her family at bedside. All current questions/concerns were addressed throughout day by RN and MD staff.   Patient vital signs are stable at this time. PRN hypertensive medication administered to maintain SBP<160. Pain managed with scheduled and prn pain medications.  Patient ambulated around room few times today and up to bedside commode/chair. No complications noted.   Tolerating clear liquid diet.    Patient remains free from falls.   No s/s of skin breakdown noted.   See MAR and Flowsheets for further details.

## 2018-01-16 NOTE — ASSESSMENT & PLAN NOTE
73 yo male s/p whipple, common bile duct exploration and j-tube placement. On pathway.     -QOD labs   -replete lytes as needed  -pulm toilet- IS, duonebs   -PT/OT. Encourage ambulation and OOBTC today   -Strict I/Os   -Lasix 40 mg today- moderately edematous, +8L for admission  -CLD   -TF tonight increase to 30cc from 8pto8a  - will trend  -Continue Toradol and hycet through Jtube  -dvt ppx

## 2018-01-16 NOTE — SUBJECTIVE & OBJECTIVE
Interval History: POD #4 whipple, common bile duct exploration and j-tube placement.   No acute events overnight. Vitals stable. Afebrile. Reports feeling much better. Started tube feeds overnight and she reports feeling full. Tolerate sips/chips without nausea or vomiting. No flatus or bowel movement yet.     Medications:  Continuous Infusions:   dextrose 5 % and 0.45 % NaCl 50 mL/hr at 01/15/18 0836     Scheduled Meds:   albuterol-ipratropium 2.5mg-0.5mg/3mL  3 mL Nebulization Q6H WAKE    enoxaparin  40 mg Subcutaneous Daily    estradiol  0.5 mg Oral Daily    flecainide  50 mg Oral BID    hydrALAZINE  10 mg Oral TID    irbesartan  150 mg Oral BID    ketorolac  30 mg Intravenous Q8H    pantoprazole  40 mg Intravenous Daily    piperacillin-tazobactam (ZOSYN) IVPB  4.5 g Intravenous Q8H    white petrolatum-mineral oil   Both Eyes QHS     PRN Meds:ALPRAZolam, diphenhydrAMINE, hydrALAZINE, hydrocodone-apap 7.5-325 MG/15 ML, hydrocodone-apap 7.5-325 MG/15 ML, labetalol     Review of patient's allergies indicates:   Allergen Reactions    Aldoril d30 [methyldopa-hydrochlorothiazide]      Itching eyeball    Flagyl [metronidazole hcl] Other (See Comments)     Yeast infection.    Hydrochlorothiazide      Itching eyeball.    Keflex [cephalexin] Diarrhea    Zithromax [azithromycin] Diarrhea     Objective:     Vital Signs (Most Recent):  Temp: 98.2 °F (36.8 °C) (01/16/18 0421)  Pulse: 68 (01/16/18 0715)  Resp: 18 (01/16/18 0715)  BP: 136/66 (01/16/18 0421)  SpO2: 97 % (01/16/18 0715) Vital Signs (24h Range):  Temp:  [97.5 °F (36.4 °C)-98.6 °F (37 °C)] 98.2 °F (36.8 °C)  Pulse:  [62-93] 68  Resp:  [16-24] 18  SpO2:  [94 %-99 %] 97 %  BP: (136-195)/(65-93) 136/66     Weight: 73.5 kg (162 lb)  Body mass index is 28.7 kg/m².    Intake/Output - Last 3 Shifts       01/14 0700 - 01/15 0659 01/15 0700 - 01/16 0659 01/16 0700 - 01/17 0659    P.O. 210 260     I.V. (mL/kg) 875.8 (11.9) 1200 (16.3)     NG/ 295     IV  Piggyback 300 300     Total Intake(mL/kg) 1545.8 (21) 2055 (28)     Urine (mL/kg/hr) 1800 (1) 2700 (1.5)     Drains 115 (0.1) 82 (0)     Total Output 1915 2782      Net -369.2 -727                 Physical Exam   Constitutional: She is oriented to person, place, and time. She appears well-developed and well-nourished. No distress.   HENT:   Head: Normocephalic and atraumatic.   Eyes: EOM are normal. Pupils are equal, round, and reactive to light.   Neck: Normal range of motion. Neck supple.   Cardiovascular: Normal rate, regular rhythm and intact distal pulses.    Pulmonary/Chest: Effort normal. No respiratory distress.   Abdominal: Soft. She exhibits no distension. There is tenderness (at incision site).   Midline incision c/d/i. Jtube in place with no bleeding or oozing from around site. JANET in place.    Musculoskeletal: Normal range of motion.   Neurological: She is alert and oriented to person, place, and time.   Skin: Skin is warm and dry. She is not diaphoretic.     Significant Labs:  CBC:     Recent Labs  Lab 01/16/18  0529   WBC 11.33   RBC 2.55*   HGB 7.5*   HCT 23.0*      MCV 90   MCH 29.4   MCHC 32.6     BMP:     Recent Labs  Lab 01/16/18  0529         K 3.2*   *   CO2 20*   BUN 8   CREATININE 0.7   CALCIUM 7.9*   MG 1.9     CMP:     Recent Labs  Lab 01/16/18  0529      CALCIUM 7.9*   ALBUMIN 2.2*   PROT 5.0*      K 3.2*   CO2 20*   *   BUN 8   CREATININE 0.7   ALKPHOS 105   ALT 18   AST 17   BILITOT 0.5     LFTs:     Recent Labs  Lab 01/16/18  0529   ALT 18   AST 17   ALKPHOS 105   BILITOT 0.5   PROT 5.0*   ALBUMIN 2.2*     Coagulation: No results for input(s): LABPROT, INR, APTT in the last 168 hours.

## 2018-01-17 LAB
ANION GAP SERPL CALC-SCNC: 6 MMOL/L
BUN SERPL-MCNC: 12 MG/DL
CALCIUM SERPL-MCNC: 8.2 MG/DL
CHLORIDE SERPL-SCNC: 109 MMOL/L
CO2 SERPL-SCNC: 24 MMOL/L
CREAT SERPL-MCNC: 0.8 MG/DL
CRP SERPL-MCNC: 134.49 MG/L
EST. GFR  (AFRICAN AMERICAN): >60 ML/MIN/1.73 M^2
EST. GFR  (NON AFRICAN AMERICAN): >60 ML/MIN/1.73 M^2
GLUCOSE SERPL-MCNC: 118 MG/DL
POTASSIUM SERPL-SCNC: 3.4 MMOL/L
SODIUM SERPL-SCNC: 139 MMOL/L

## 2018-01-17 PROCEDURE — 25000003 PHARM REV CODE 250: Performed by: NURSE PRACTITIONER

## 2018-01-17 PROCEDURE — 25000003 PHARM REV CODE 250: Performed by: SURGERY

## 2018-01-17 PROCEDURE — 80048 BASIC METABOLIC PNL TOTAL CA: CPT

## 2018-01-17 PROCEDURE — 97530 THERAPEUTIC ACTIVITIES: CPT

## 2018-01-17 PROCEDURE — 25000003 PHARM REV CODE 250: Performed by: STUDENT IN AN ORGANIZED HEALTH CARE EDUCATION/TRAINING PROGRAM

## 2018-01-17 PROCEDURE — 63600175 PHARM REV CODE 636 W HCPCS: Performed by: SURGERY

## 2018-01-17 PROCEDURE — 63600175 PHARM REV CODE 636 W HCPCS: Performed by: STUDENT IN AN ORGANIZED HEALTH CARE EDUCATION/TRAINING PROGRAM

## 2018-01-17 PROCEDURE — 36415 COLL VENOUS BLD VENIPUNCTURE: CPT

## 2018-01-17 PROCEDURE — 86141 C-REACTIVE PROTEIN HS: CPT

## 2018-01-17 PROCEDURE — 25000242 PHARM REV CODE 250 ALT 637 W/ HCPCS: Performed by: NURSE PRACTITIONER

## 2018-01-17 PROCEDURE — 97535 SELF CARE MNGMENT TRAINING: CPT

## 2018-01-17 PROCEDURE — C9113 INJ PANTOPRAZOLE SODIUM, VIA: HCPCS | Performed by: SURGERY

## 2018-01-17 PROCEDURE — 20600001 HC STEP DOWN PRIVATE ROOM

## 2018-01-17 PROCEDURE — 94640 AIRWAY INHALATION TREATMENT: CPT

## 2018-01-17 PROCEDURE — 97803 MED NUTRITION INDIV SUBSEQ: CPT

## 2018-01-17 RX ORDER — ONDANSETRON 2 MG/ML
4 INJECTION INTRAMUSCULAR; INTRAVENOUS EVERY 6 HOURS PRN
Status: DISCONTINUED | OUTPATIENT
Start: 2018-01-17 | End: 2018-01-23 | Stop reason: HOSPADM

## 2018-01-17 RX ADMIN — PIPERACILLIN SODIUM AND TAZOBACTAM SODIUM 4.5 G: 4; .5 INJECTION, POWDER, LYOPHILIZED, FOR SOLUTION INTRAVENOUS at 05:01

## 2018-01-17 RX ADMIN — HYDRALAZINE HYDROCHLORIDE 10 MG: 10 TABLET, FILM COATED ORAL at 03:01

## 2018-01-17 RX ADMIN — HYDRALAZINE HYDROCHLORIDE 10 MG: 10 TABLET, FILM COATED ORAL at 10:01

## 2018-01-17 RX ADMIN — IPRATROPIUM BROMIDE AND ALBUTEROL SULFATE 3 ML: .5; 3 SOLUTION RESPIRATORY (INHALATION) at 09:01

## 2018-01-17 RX ADMIN — HYDROCODONE BITARTRATE AND ACETAMINOPHEN 15 ML: 7.5; 325 SOLUTION ORAL at 03:01

## 2018-01-17 RX ADMIN — HYDROCODONE BITARTRATE AND ACETAMINOPHEN 30 ML: 7.5; 325 SOLUTION ORAL at 08:01

## 2018-01-17 RX ADMIN — ESTRADIOL 0.5 MG: 0.5 TABLET ORAL at 08:01

## 2018-01-17 RX ADMIN — FLECAINIDE ACETATE 50 MG: 50 TABLET ORAL at 08:01

## 2018-01-17 RX ADMIN — ENOXAPARIN SODIUM 40 MG: 100 INJECTION SUBCUTANEOUS at 06:01

## 2018-01-17 RX ADMIN — IRBESARTAN 150 MG: 75 TABLET ORAL at 09:01

## 2018-01-17 RX ADMIN — KETOROLAC TROMETHAMINE 30 MG: 15 INJECTION, SOLUTION INTRAMUSCULAR; INTRAVENOUS at 05:01

## 2018-01-17 RX ADMIN — PIPERACILLIN SODIUM AND TAZOBACTAM SODIUM 4.5 G: 4; .5 INJECTION, POWDER, LYOPHILIZED, FOR SOLUTION INTRAVENOUS at 06:01

## 2018-01-17 RX ADMIN — PANTOPRAZOLE SODIUM 40 MG: 40 INJECTION, POWDER, FOR SOLUTION INTRAVENOUS at 09:01

## 2018-01-17 RX ADMIN — IRBESARTAN 150 MG: 75 TABLET ORAL at 10:01

## 2018-01-17 RX ADMIN — MINERAL OIL AND WHITE PETROLATUM: 150; 830 OINTMENT OPHTHALMIC at 08:01

## 2018-01-17 RX ADMIN — HYDRALAZINE HYDROCHLORIDE 10 MG: 10 TABLET, FILM COATED ORAL at 05:01

## 2018-01-17 NOTE — SUBJECTIVE & OBJECTIVE
Interval History: POD #5 whipple, common bile duct exploration and j-tube placement.   No acute events overnight. Vitals stable. Afebrile. Reports feeling much better. Tolerating tube feeds overnight and CLD.  Had bowel movement. She did not ambulate yesterday due to feeling weak and concerns for falling. Will require increased motivation to get out of bed.    Medications:  Continuous Infusions:    Scheduled Meds:   bisacodyl  10 mg Rectal Daily    enoxaparin  40 mg Subcutaneous Daily    estradiol  0.5 mg Oral Daily    flecainide  50 mg Oral BID    hydrALAZINE  10 mg Oral TID    irbesartan  150 mg Oral BID    pantoprazole  40 mg Intravenous Daily    piperacillin-tazobactam (ZOSYN) IVPB  4.5 g Intravenous Q8H    white petrolatum-mineral oil   Both Eyes QHS     PRN Meds:ALPRAZolam, diphenhydrAMINE, hydrALAZINE, hydrocodone-apap 7.5-325 MG/15 ML, hydrocodone-apap 7.5-325 MG/15 ML, labetalol, ondansetron     Review of patient's allergies indicates:   Allergen Reactions    Aldoril d30 [methyldopa-hydrochlorothiazide]      Itching eyeball    Flagyl [metronidazole hcl] Other (See Comments)     Yeast infection.    Hydrochlorothiazide      Itching eyeball.    Keflex [cephalexin] Diarrhea    Zithromax [azithromycin] Diarrhea     Objective:     Vital Signs (Most Recent):  Temp: 97.8 °F (36.6 °C) (01/17/18 0828)  Pulse: 73 (01/17/18 0924)  Resp: 20 (01/17/18 0924)  BP: (!) 144/68 (01/17/18 0828)  SpO2: 96 % (01/17/18 0924) Vital Signs (24h Range):  Temp:  [97.6 °F (36.4 °C)-98.4 °F (36.9 °C)] 97.8 °F (36.6 °C)  Pulse:  [56-90] 73  Resp:  [14-29] 20  SpO2:  [93 %-100 %] 96 %  BP: (122-176)/(59-77) 144/68     Weight: 73.5 kg (162 lb)  Body mass index is 28.7 kg/m².    Intake/Output - Last 3 Shifts       01/15 0700 - 01/16 0659 01/16 0700 - 01/17 0659 01/17 0700 - 01/18 0659    P.O. 260 540     I.V. (mL/kg) 1200 (16.3) 50 (0.7)     NG/ 430     IV Piggyback 300 800     Total Intake(mL/kg) 2055 (28) 1820 (24.8)      Urine (mL/kg/hr) 2700 (1.5) 2675 (1.5)     Drains 82 (0) 36 (0)     Stool  0 (0)     Total Output 2782 8791      Net -599 -880             Urine Occurrence  2 x     Stool Occurrence  2 x         Physical Exam   Constitutional: She is oriented to person, place, and time. She appears well-developed and well-nourished. No distress.   HENT:   Head: Normocephalic and atraumatic.   Eyes: EOM are normal. Pupils are equal, round, and reactive to light.   Neck: Normal range of motion. Neck supple.   Cardiovascular: Normal rate, regular rhythm and intact distal pulses.    Pulmonary/Chest: Effort normal. No respiratory distress.   Abdominal: Soft. She exhibits no distension. There is tenderness (at incision site).   Midline incision c/d/i. Jtube in place with no bleeding or oozing from around site. JANET in place 11 cc output .    Musculoskeletal: Normal range of motion.   Neurological: She is alert and oriented to person, place, and time.   Skin: Skin is warm and dry. She is not diaphoretic.     Significant Labs:  CBC:     Recent Labs  Lab 01/16/18  0529   WBC 11.33   RBC 2.55*   HGB 7.5*   HCT 23.0*      MCV 90   MCH 29.4   MCHC 32.6     BMP:     Recent Labs  Lab 01/16/18  0529         K 3.2*   *   CO2 20*   BUN 8   CREATININE 0.7   CALCIUM 7.9*   MG 1.9     CMP:     Recent Labs  Lab 01/16/18  0529      CALCIUM 7.9*   ALBUMIN 2.2*   PROT 5.0*      K 3.2*   CO2 20*   *   BUN 8   CREATININE 0.7   ALKPHOS 105   ALT 18   AST 17   BILITOT 0.5     LFTs:     Recent Labs  Lab 01/16/18  0529   ALT 18   AST 17   ALKPHOS 105   BILITOT 0.5   PROT 5.0*   ALBUMIN 2.2*     Coagulation: No results for input(s): LABPROT, INR, APTT in the last 168 hours.

## 2018-01-17 NOTE — PROGRESS NOTES
[facility]  General Surgery  Progress Note    Subjective:     History of Present Illness:  No notes on file    Post-Op Info:  Procedure(s) (LRB):  WHIPPLE (N/A)  EXPLORATION-COMMON BILE DUCT (N/A)  PLACEMENT-TUBE-JEJUNOSTOMY (N/A)   5 Days Post-Op     Interval History: POD #5 whipple, common bile duct exploration and j-tube placement.   No acute events overnight. Vitals stable. Afebrile. Reports feeling much better. Tolerating tube feeds overnight and CLD.  Had bowel movement. She did not ambulate yesterday due to feeling weak and concerns for falling. Will require increased motivation to get out of bed.    Medications:  Continuous Infusions:    Scheduled Meds:   bisacodyl  10 mg Rectal Daily    enoxaparin  40 mg Subcutaneous Daily    estradiol  0.5 mg Oral Daily    flecainide  50 mg Oral BID    hydrALAZINE  10 mg Oral TID    irbesartan  150 mg Oral BID    pantoprazole  40 mg Intravenous Daily    piperacillin-tazobactam (ZOSYN) IVPB  4.5 g Intravenous Q8H    white petrolatum-mineral oil   Both Eyes QHS     PRN Meds:ALPRAZolam, diphenhydrAMINE, hydrALAZINE, hydrocodone-apap 7.5-325 MG/15 ML, hydrocodone-apap 7.5-325 MG/15 ML, labetalol, ondansetron     Review of patient's allergies indicates:   Allergen Reactions    Aldoril d30 [methyldopa-hydrochlorothiazide]      Itching eyeball    Flagyl [metronidazole hcl] Other (See Comments)     Yeast infection.    Hydrochlorothiazide      Itching eyeball.    Keflex [cephalexin] Diarrhea    Zithromax [azithromycin] Diarrhea     Objective:     Vital Signs (Most Recent):  Temp: 97.8 °F (36.6 °C) (01/17/18 0828)  Pulse: 73 (01/17/18 0924)  Resp: 20 (01/17/18 0924)  BP: (!) 144/68 (01/17/18 0828)  SpO2: 96 % (01/17/18 0924) Vital Signs (24h Range):  Temp:  [97.6 °F (36.4 °C)-98.4 °F (36.9 °C)] 97.8 °F (36.6 °C)  Pulse:  [56-90] 73  Resp:  [14-29] 20  SpO2:  [93 %-100 %] 96 %  BP: (122-176)/(59-77) 144/68     Weight: 73.5 kg (162 lb)  Body mass  index is 28.7 kg/m².    Intake/Output - Last 3 Shifts       01/15 0700 - 01/16 0659 01/16 0700 - 01/17 0659 01/17 0700 - 01/18 0659    P.O. 260 540     I.V. (mL/kg) 1200 (16.3) 50 (0.7)     NG/ 430     IV Piggyback 300 800     Total Intake(mL/kg) 2055 (28) 1820 (24.8)     Urine (mL/kg/hr) 2700 (1.5) 2675 (1.5)     Drains 82 (0) 36 (0)     Stool  0 (0)     Total Output 2782 2711      Net -395 -154             Urine Occurrence  2 x     Stool Occurrence  2 x         Physical Exam   Constitutional: She is oriented to person, place, and time. She appears well-developed and well-nourished. No distress.   HENT:   Head: Normocephalic and atraumatic.   Eyes: EOM are normal. Pupils are equal, round, and reactive to light.   Neck: Normal range of motion. Neck supple.   Cardiovascular: Normal rate, regular rhythm and intact distal pulses.    Pulmonary/Chest: Effort normal. No respiratory distress.   Abdominal: Soft. She exhibits no distension. There is tenderness (at incision site).   Midline incision c/d/i. Jtube in place with no bleeding or oozing from around site. JANET in place 11 cc output .    Musculoskeletal: Normal range of motion.   Neurological: She is alert and oriented to person, place, and time.   Skin: Skin is warm and dry. She is not diaphoretic.     Significant Labs:  CBC:     Recent Labs  Lab 01/16/18  0529   WBC 11.33   RBC 2.55*   HGB 7.5*   HCT 23.0*      MCV 90   MCH 29.4   MCHC 32.6     BMP:     Recent Labs  Lab 01/16/18  0529         K 3.2*   *   CO2 20*   BUN 8   CREATININE 0.7   CALCIUM 7.9*   MG 1.9     CMP:     Recent Labs  Lab 01/16/18  0529      CALCIUM 7.9*   ALBUMIN 2.2*   PROT 5.0*      K 3.2*   CO2 20*   *   BUN 8   CREATININE 0.7   ALKPHOS 105   ALT 18   AST 17   BILITOT 0.5     LFTs:     Recent Labs  Lab 01/16/18  0529   ALT 18   AST 17   ALKPHOS 105   BILITOT 0.5   PROT 5.0*   ALBUMIN 2.2*     Coagulation: No results for input(s): LABPROT, INR,  APTT in the last 168 hours.      Assessment/Plan:     * Ampullary carcinoma    73 yo male s/p whipple, common bile duct exploration and j-tube placement. On modified pathway.    -QOD labs   -replete lytes as needed  -pulm toilet- IS, duonebs   -PT/OT. Patient needs to be out of bed in chair majority of day. She needs to be walking halls multiple times a day. Will require motivation and encourage from nursing/PT.  -Strict I/Os   -Responded well to lasix 40mg yesterday. -1L/24 hours.   -CLD   -TF tonight increase to 40cc from 8pto8a  - will trend  -Continue Toradol and hycet through Jtube  -dvt ppx              Niki James MD  General Surgery  [facility]

## 2018-01-17 NOTE — PLAN OF CARE
LUC learned from Surgery NP, Jenn Barry, that Pt will require home health and home tube feeds and may be ready to discharge home as soon as Friday. LUC met with Pt who said her  had used [facility] in the past and would like to be referred to this agency. Pt did not have a preference for home infusion agency. LUC sent all necessary referral information to [facility] and [facility] via [facility]. SW to continue to follow.     [place] LCSW

## 2018-01-17 NOTE — PT/OT/SLP PROGRESS
Occupational Therapy   Treatment    Name: Vonnie Lees  MRN: [id number]  Admitting Diagnosis:  Ampullary carcinoma  5 Days Post-Op    Recommendations:     Discharge Recommendations: home with home health  Discharge Equipment Recommendations:  walker, rolling  Barriers to discharge:  None    Subjective     Communicated with: RN prior to session.  Pain/Comfort:  · Pain Rating 1: 0/10  · Location - Side 1: Bilateral  · Location - Orientation 1: generalized  · Location 1: abdomen  · Pain Addressed 1: Reposition, Distraction  · Pain Rating Post-Intervention 1: 3/10    Objective:     Patient found with: pulse ox (continuous), telemetry, JANET drain    General Precautions: Standard, fall   Orthopedic Precautions:N/A   Braces: N/A     Occupational Performance:    Bed Mobility:    · Pt found and left UIC     Functional Mobility/Transfers:  · Patient completed Sit <> Stand Transfer with modified independence  with  no assistive device   · Patient completed Bed <> Chair Transfer using Step Transfer technique with modified independence with rolling walker    Activities of Daily Living:  · UB Dressing: modified independence donning personal robe in standing     Patient left up in chair with all lines intact, call button in reach and son and   present    AMPA 6 Click:  [facility] Total Score: 19    Treatment & Education:  Pt educated on importance of OOB activity and safety with functional mobility w/ RW; pt encouraged to continue ambulating hallways with family and nursing throughout the day to maximize recovery. RW ordered for pt's room to increase safety and support w/ functional ambulation within room. Pt ambulated ~228ft w/ RW and SBA with no LOB or SOB but pt reported feeling weak. Pt c/o slight abdominal pain w/ trunk flexion upon transferring standing>sit UIC. All questions and concerns were answered.   Education:    Assessment:     Vonnie Lees is a 72 y.o. female with a medical diagnosis of Ampullary carcinoma.  She  presents with abdominal pain w/ trunk flexion limiting her with self-care skills (LB dressing, toileting). She demonstrates improved balance and activity tolerance this date progressing well towards functional mobility outcomes.  Performance deficits affecting function are weakness, impaired endurance, impaired self care skills, impaired functional mobilty, gait instability, pain.      Rehab Prognosis:  Good; patient would benefit from acute skilled OT services to address these deficits and reach maximum level of function.       Plan:     Patient to be seen 4 x/week to address the above listed problems via self-care/home management, therapeutic activities, therapeutic exercises  · Plan of Care Expires: 02/12/18  · Plan of Care Reviewed with: patient    This Plan of care has been discussed with the patient who was involved in its development and understands and is in agreement with the identified goals and treatment plan    GOALS:    Occupational Therapy Goals        Problem: Occupational Therapy Goal    Goal Priority Disciplines Outcome Interventions   Occupational Therapy Goal     OT, PT/OT Ongoing (interventions implemented as appropriate)    Description:  Goals to be met by: 7 days      Patient will increase functional independence with ADLs by performing:    UE Dressing with Supervision. -- Met 1/17  LE Dressing with Supervision.  Grooming while standing with Supervision.  Toileting from toilet with Supervision for hygiene and clothing management.   Supine to sit with Supervision.  Stand pivot transfers with Supervision. -- Met 1/17  Toilet transfer to toilet with Supervision.                       Time Tracking:     OT Date of Treatment: 01/17/18  OT Start Time: 1052  OT Stop Time: 1115  OT Total Time (min): 23 min    Billable Minutes:Self Care/Home Management 10  Therapeutic Activity 13    Ilene Charlton, OT  1/17/2018

## 2018-01-17 NOTE — PLAN OF CARE
Problem: Occupational Therapy Goal  Goal: Occupational Therapy Goal  Goals to be met by: 7 days      Patient will increase functional independence with ADLs by performing:    UE Dressing with Supervision. -- Met 1/17  LE Dressing with Supervision.  Grooming while standing with Supervision.  Toileting from toilet with Supervision for hygiene and clothing management.   Supine to sit with Supervision.  Stand pivot transfers with Supervision. -- Met 1/17  Toilet transfer to toilet with Supervision.     Outcome: Ongoing (interventions implemented as appropriate)  Pt progressing well towards all functional outcomes at this time    Comments: Cont OT POC

## 2018-01-17 NOTE — PLAN OF CARE
Problem: Patient Care Overview  Goal: Plan of Care Review  Outcome: Ongoing (interventions implemented as appropriate)  Pt remains free from falls and injuries. J tube (clamped), G tube (gravity), JANET (bulb) remain. Noé clear liquid diet and TF overnight. Pt up to commode Pt repositioned independently. Plan of care discussed with pt and pt's daughter, who remained at bedside. VSS, no acute issues overnight.

## 2018-01-17 NOTE — PROGRESS NOTES
[facility]  Adult Nutrition  Progress Note    SUMMARY     Recommendations  Recommendation/Intervention:   1. Continue to increase TF to goal rate of 80 mL/hr from 8p-8a - to provide 1440 kcal/day, 65 g protein/day, and 733 mL free fluid/day.   2. As medically appropriate, ADAT to Regular with texture per SLP.   RD to monitor.    Goals: Pt to receive nutrition by RD follow-up.   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 Tolerated TF last night, plan to increase to 40 mL/hr tonight.  Nutrition Discharge Planning: Adequate nutrition via TF + PO intake    Nutrition Prescription Ordered  Current Diet Order: Clear Liquid  Current Nutrition Support Formula Ordered: Isosource 1.5  Current Nutrition Support Rate Ordered: 70 (ml)  Current Nutrition Support Frequency Ordered: mL/hr from 8p-8a      Evaluation of Received Nutrients/Fluid Intake  Enteral Calories (kcal): 1260  Enteral Protein (gm): 57  Enteral (Free Water) Fluid (mL): 642  Energy Calories Required: not meeting needs  % Kcal Needs: 69  Protein Required: not meeting needs  % Protein Needs: 65  I/O: +8.3L since admit  Fluid Required: not meeting needs  Comments: LBM 1/17  Tolerance: tolerating  % Intake of Estimated Energy Needs: 50 - 75 %  % Meal Intake: Other: CL diet     Nutrition/Diet History   Food Preferences: No cultural/Buddhist needs identified at this time.  Factors Affecting Nutritional Intake: altered gastrointestinal function, other (see comments) (CL diet)    Labs/Tests/Procedures/Meds   Pertinent Labs Reviewed: reviewed  Pertinent Labs Comments: K 3.2, Cl 112, Ca 7.9, Phos 2.6, Alb 2.2, CRP  "134.5  Pertinent Medications Reviewed: reviewed  Pertinent Medications Comments: pantoprazole    Physical Findings  Overall Physical Appearance: overweight  Tubes: gastrostomy tube, jejunostomy tube  Oral/Mouth Cavity: tooth/teeth missing  Skin: edema, incision    Anthropometrics  Height: 5' 3" (160 cm)  Weight Method: Stated  Weight: 73.5 kg (162 lb)  Ideal Body Weight (IBW), Female: 115 lb  % Ideal Body Weight, Female (lb): 140.87 lb  BMI (Calculated): 28.8  BMI Grade: 25 - 29.9 - overweight  Usual Body Weight (UBW), k.4 kg (2017 per chart review)  % Usual Body Weight: 98.97  % Weight Change From Usual Weight: -1.23 %    Estimated/Assessed Needs  Weight Used For Calorie Calculations: 73.5 kg (162 lb 0.6 oz)   Energy Calorie Requirements (kcal): 3031-2445  Energy Need Method: Kcal/kg (25-30)  RMR ([place]-St. Jeor Equation): 1204.12  Weight Used For Protein Calculations: 73.5 kg (162 lb 0.6 oz)  Protein Requirements:  g/day (1.2-1.4 g/kg)  Fluid Requirements (mL): 1 mL/kcal or per MD  Fluid Need Method: RDA Method  RDA Method (mL):     Assessment and Plan  * Ampullary carcinoma    Nutrition Problem  Inadequate energy intake    Related to (etiology):   Decreased ability to consume sufficient energy    Signs and Symptoms (as evidenced by):   NPO with no alternative means of nutrition at this time    Nutrition Diagnosis Status:   Continues          Monitor and Evaluation  Food and Nutrient Intake: energy intake, food and beverage intake, enteral nutrition intake  Food and Nutrient Adminstration: diet order, enteral and parenteral nutrition administration  Physical Activity and Function: nutrition-related ADLs and IADLs  Anthropometric Measurements: weight, weight change  Biochemical Data, Medical Tests and Procedures: electrolyte and renal panel, gastrointestinal profile, inflammatory profile  Nutrition-Focused Physical Findings: overall appearance    Nutrition Risk  Level of Risk:  " (2x/week)    Nutrition Follow-Up  RD Follow-up?: Yes

## 2018-01-17 NOTE — ASSESSMENT & PLAN NOTE
73 yo male s/p whipple, common bile duct exploration and j-tube placement. On modified pathway.    -QOD labs   -replete lytes as needed  -pulm toilet- IS, duradha   -PT/OT. Patient needs to be out of bed in chair majority of day. She needs to be walking halls multiple times a day. Will require motivation and encourage from nursing/PT.  -Strict I/Os   -Responded well to lasix 40mg yesterday. -1L/24 hours.   -CLD   -TF tonight increase to 40cc from 8pto8a  - will trend  -Continue Toradol and hycet through Jtube  -dvt ppx

## 2018-01-17 NOTE — ASSESSMENT & PLAN NOTE
Nutrition Problem  Inadequate energy intake    Related to (etiology):   Decreased ability to consume sufficient energy    Signs and Symptoms (as evidenced by):   NPO with no alternative means of nutrition at this time    Nutrition Diagnosis Status:   Continues

## 2018-01-18 LAB
BASOPHILS # BLD AUTO: 0.02 K/UL
BASOPHILS NFR BLD: 0.2 %
CA-I BLDV-SCNC: 1.07 MMOL/L
CRP SERPL-MCNC: 84.06 MG/L
DIFFERENTIAL METHOD: ABNORMAL
EOSINOPHIL # BLD AUTO: 0.6 K/UL
EOSINOPHIL NFR BLD: 6.4 %
ERYTHROCYTE [DISTWIDTH] IN BLOOD BY AUTOMATED COUNT: 14 %
HCT VFR BLD AUTO: 22.2 %
HGB BLD-MCNC: 7.3 G/DL
IMM GRANULOCYTES # BLD AUTO: 0.06 K/UL
IMM GRANULOCYTES NFR BLD AUTO: 0.7 %
LYMPHOCYTES # BLD AUTO: 2 K/UL
LYMPHOCYTES NFR BLD: 22.2 %
MCH RBC QN AUTO: 29.4 PG
MCHC RBC AUTO-ENTMCNC: 32.9 G/DL
MCV RBC AUTO: 90 FL
MONOCYTES # BLD AUTO: 0.8 K/UL
MONOCYTES NFR BLD: 8.5 %
NEUTROPHILS # BLD AUTO: 5.6 K/UL
NEUTROPHILS NFR BLD: 62 %
NRBC BLD-RTO: 0 /100 WBC
PLATELET # BLD AUTO: 194 K/UL
PMV BLD AUTO: 10.1 FL
RBC # BLD AUTO: 2.48 M/UL
WBC # BLD AUTO: 9.01 K/UL

## 2018-01-18 PROCEDURE — 97116 GAIT TRAINING THERAPY: CPT

## 2018-01-18 PROCEDURE — 84100 ASSAY OF PHOSPHORUS: CPT

## 2018-01-18 PROCEDURE — 63600175 PHARM REV CODE 636 W HCPCS: Performed by: SURGERY

## 2018-01-18 PROCEDURE — 63600175 PHARM REV CODE 636 W HCPCS: Performed by: NURSE PRACTITIONER

## 2018-01-18 PROCEDURE — 97110 THERAPEUTIC EXERCISES: CPT

## 2018-01-18 PROCEDURE — 83735 ASSAY OF MAGNESIUM: CPT

## 2018-01-18 PROCEDURE — 80053 COMPREHEN METABOLIC PANEL: CPT

## 2018-01-18 PROCEDURE — 20600001 HC STEP DOWN PRIVATE ROOM

## 2018-01-18 PROCEDURE — 36569 INSJ PICC 5 YR+ W/O IMAGING: CPT

## 2018-01-18 PROCEDURE — 25000003 PHARM REV CODE 250: Performed by: NURSE PRACTITIONER

## 2018-01-18 PROCEDURE — C9113 INJ PANTOPRAZOLE SODIUM, VIA: HCPCS | Performed by: SURGERY

## 2018-01-18 PROCEDURE — C1751 CATH, INF, PER/CENT/MIDLINE: HCPCS

## 2018-01-18 PROCEDURE — 63600175 PHARM REV CODE 636 W HCPCS: Performed by: STUDENT IN AN ORGANIZED HEALTH CARE EDUCATION/TRAINING PROGRAM

## 2018-01-18 PROCEDURE — 25000003 PHARM REV CODE 250: Performed by: STUDENT IN AN ORGANIZED HEALTH CARE EDUCATION/TRAINING PROGRAM

## 2018-01-18 PROCEDURE — 76937 US GUIDE VASCULAR ACCESS: CPT

## 2018-01-18 PROCEDURE — 85025 COMPLETE CBC W/AUTO DIFF WBC: CPT

## 2018-01-18 PROCEDURE — 82330 ASSAY OF CALCIUM: CPT

## 2018-01-18 PROCEDURE — 25000003 PHARM REV CODE 250: Performed by: SURGERY

## 2018-01-18 PROCEDURE — 36415 COLL VENOUS BLD VENIPUNCTURE: CPT

## 2018-01-18 PROCEDURE — 86141 C-REACTIVE PROTEIN HS: CPT

## 2018-01-18 RX ORDER — PANTOPRAZOLE SODIUM 40 MG/1
40 TABLET, DELAYED RELEASE ORAL DAILY
Status: DISCONTINUED | OUTPATIENT
Start: 2018-01-19 | End: 2018-01-19

## 2018-01-18 RX ORDER — FUROSEMIDE 10 MG/ML
40 INJECTION INTRAMUSCULAR; INTRAVENOUS ONCE
Status: COMPLETED | OUTPATIENT
Start: 2018-01-18 | End: 2018-01-18

## 2018-01-18 RX ORDER — CALCIUM CARBONATE 200(500)MG
500 TABLET,CHEWABLE ORAL 3 TIMES DAILY PRN
Status: DISCONTINUED | OUTPATIENT
Start: 2018-01-18 | End: 2018-01-23 | Stop reason: HOSPADM

## 2018-01-18 RX ORDER — POTASSIUM CHLORIDE 20 MEQ/15ML
40 SOLUTION ORAL ONCE
Status: COMPLETED | OUTPATIENT
Start: 2018-01-18 | End: 2018-01-18

## 2018-01-18 RX ORDER — FUROSEMIDE 10 MG/ML
20 INJECTION INTRAMUSCULAR; INTRAVENOUS ONCE
Status: COMPLETED | OUTPATIENT
Start: 2018-01-18 | End: 2018-01-18

## 2018-01-18 RX ADMIN — HYDROCODONE BITARTRATE AND ACETAMINOPHEN 30 ML: 7.5; 325 SOLUTION ORAL at 07:01

## 2018-01-18 RX ADMIN — HYDROCODONE BITARTRATE AND ACETAMINOPHEN 15 ML: 7.5; 325 SOLUTION ORAL at 12:01

## 2018-01-18 RX ADMIN — HYDROCODONE BITARTRATE AND ACETAMINOPHEN 30 ML: 7.5; 325 SOLUTION ORAL at 02:01

## 2018-01-18 RX ADMIN — ENOXAPARIN SODIUM 40 MG: 100 INJECTION SUBCUTANEOUS at 04:01

## 2018-01-18 RX ADMIN — CALCIUM CARBONATE (ANTACID) CHEW TAB 500 MG 500 MG: 500 CHEW TAB at 08:01

## 2018-01-18 RX ADMIN — FUROSEMIDE 20 MG: 10 INJECTION, SOLUTION INTRAMUSCULAR; INTRAVENOUS at 02:01

## 2018-01-18 RX ADMIN — IRBESARTAN 150 MG: 75 TABLET ORAL at 08:01

## 2018-01-18 RX ADMIN — PIPERACILLIN SODIUM AND TAZOBACTAM SODIUM 4.5 G: 4; .5 INJECTION, POWDER, LYOPHILIZED, FOR SOLUTION INTRAVENOUS at 09:01

## 2018-01-18 RX ADMIN — PANTOPRAZOLE SODIUM 40 MG: 40 INJECTION, POWDER, FOR SOLUTION INTRAVENOUS at 08:01

## 2018-01-18 RX ADMIN — HYDROCODONE BITARTRATE AND ACETAMINOPHEN 30 ML: 7.5; 325 SOLUTION ORAL at 05:01

## 2018-01-18 RX ADMIN — MINERAL OIL AND WHITE PETROLATUM: 150; 830 OINTMENT OPHTHALMIC at 08:01

## 2018-01-18 RX ADMIN — FLECAINIDE ACETATE 50 MG: 50 TABLET ORAL at 08:01

## 2018-01-18 RX ADMIN — POTASSIUM CHLORIDE 40 MEQ: 20 SOLUTION ORAL at 02:01

## 2018-01-18 RX ADMIN — HYDRALAZINE HYDROCHLORIDE 10 MG: 10 TABLET, FILM COATED ORAL at 09:01

## 2018-01-18 RX ADMIN — FUROSEMIDE 40 MG: 10 INJECTION, SOLUTION INTRAMUSCULAR; INTRAVENOUS at 06:01

## 2018-01-18 RX ADMIN — HYDRALAZINE HYDROCHLORIDE 10 MG: 10 TABLET, FILM COATED ORAL at 06:01

## 2018-01-18 RX ADMIN — PIPERACILLIN SODIUM AND TAZOBACTAM SODIUM 4.5 G: 4; .5 INJECTION, POWDER, LYOPHILIZED, FOR SOLUTION INTRAVENOUS at 03:01

## 2018-01-18 RX ADMIN — PIPERACILLIN SODIUM AND TAZOBACTAM SODIUM 4.5 G: 4; .5 INJECTION, POWDER, LYOPHILIZED, FOR SOLUTION INTRAVENOUS at 05:01

## 2018-01-18 RX ADMIN — ONDANSETRON 4 MG: 2 INJECTION INTRAMUSCULAR; INTRAVENOUS at 06:01

## 2018-01-18 RX ADMIN — ESTRADIOL 0.5 MG: 0.5 TABLET ORAL at 08:01

## 2018-01-18 RX ADMIN — HYDRALAZINE HYDROCHLORIDE 10 MG: 10 TABLET, FILM COATED ORAL at 02:01

## 2018-01-18 NOTE — PLAN OF CARE
Problem: Patient Care Overview  Goal: Plan of Care Review  Outcome: Ongoing (interventions implemented as appropriate)  Plan of care reviewed with patient, a 72 year old female with the DX of ampulla of Vater Mass,,Ampulary carcinoma,, Had a whipple on the 12th, HX of TIA, HPTN, gerd, ,,,, Patient of Dr. Rangel, LEONARD EDILBERTO with derm,,,Jtube ,,Right JANET drain, Right EJ with a consult for PICC line ,,,, NS at 10 Iso source at 40 from 8pm to 8am,,, up ad roel to Bathroom, Full liquid diet, room air,,,family at bedside,   Uneventful night ,,she got up several times and used the restroom, no falls,, bed locked and low call light in reach.....

## 2018-01-18 NOTE — SUBJECTIVE & OBJECTIVE
Interval History: POD #6 whipple, common bile duct exploration and j-tube placement.   No acute events overnight. Vitals stable. Afebrile.  Tolerating tube feeds overnight and FLD- no n/v.  Had multiple bowel movements. Ambulated yesterday.     Medications:  Continuous Infusions:    Scheduled Meds:   bisacodyl  10 mg Rectal Daily    enoxaparin  40 mg Subcutaneous Daily    estradiol  0.5 mg Oral Daily    flecainide  50 mg Oral BID    furosemide  40 mg Intravenous Once    hydrALAZINE  10 mg Oral TID    irbesartan  150 mg Oral BID    pantoprazole  40 mg Intravenous Daily    piperacillin-tazobactam (ZOSYN) IVPB  4.5 g Intravenous Q8H    white petrolatum-mineral oil   Both Eyes QHS     PRN Meds:ALPRAZolam, diphenhydrAMINE, hydrALAZINE, hydrocodone-apap 7.5-325 MG/15 ML, hydrocodone-apap 7.5-325 MG/15 ML, labetalol, ondansetron     Review of patient's allergies indicates:   Allergen Reactions    Aldoril d30 [methyldopa-hydrochlorothiazide]      Itching eyeball    Flagyl [metronidazole hcl] Other (See Comments)     Yeast infection.    Hydrochlorothiazide      Itching eyeball.    Keflex [cephalexin] Diarrhea    Zithromax [azithromycin] Diarrhea     Objective:     Vital Signs (Most Recent):  Temp: 98.2 °F (36.8 °C) (01/18/18 0500)  Pulse: 74 (01/17/18 1554)  Resp: 20 (01/17/18 1554)  BP: 137/64 (01/18/18 0500)  SpO2: 98 % (01/17/18 1554) Vital Signs (24h Range):  Temp:  [97.8 °F (36.6 °C)-98.4 °F (36.9 °C)] 98.2 °F (36.8 °C)  Pulse:  [65-89] 74  Resp:  [12-24] 20  SpO2:  [94 %-100 %] 98 %  BP: (137-166)/(64-77) 137/64     Weight: 73.5 kg (161 lb 15.9 oz)  Body mass index is 28.7 kg/m².    Intake/Output - Last 3 Shifts       01/16 0700 - 01/17 0659 01/17 0700 - 01/18 0659    P.O. 540 120    I.V. (mL/kg) 50 (0.7)     NG/ 140    IV Piggyback 800 100    Total Intake(mL/kg) 1820 (24.8) 360 (4.9)    Urine (mL/kg/hr) 2675 (1.5) 400 (0.2)    Emesis/NG output  0 (0)    Drains 36 (0) 15 (0)    Stool 0 (0) 0 (0)     Blood  0 (0)    Total Output 2711 415    Net -891 -55          Urine Occurrence 2 x 2 x    Stool Occurrence 2 x 1 x    Emesis Occurrence  0 x        Physical Exam   Constitutional: She is oriented to person, place, and time. She appears well-developed and well-nourished. No distress.   HENT:   Head: Normocephalic and atraumatic.   Eyes: EOM are normal. Pupils are equal, round, and reactive to light.   Neck: Normal range of motion. Neck supple.   Cardiovascular: Normal rate, regular rhythm and intact distal pulses.    Pulmonary/Chest: Effort normal. No respiratory distress.   Abdominal: Soft. She exhibits no distension. There is tenderness (at incision site).   Midline incision c/d/i. Jtube in place with no bleeding or oozing from around site. JANET in place 15 cc ss fluid .    Musculoskeletal: Normal range of motion.   Neurological: She is alert and oriented to person, place, and time.   Skin: Skin is warm and dry. She is not diaphoretic.     Significant Labs:  CBC:     Recent Labs  Lab 01/18/18  0409   WBC 9.01   RBC 2.48*   HGB 7.3*   HCT 22.2*      MCV 90   MCH 29.4   MCHC 32.9     BMP:     Recent Labs  Lab 01/16/18  0529 01/17/18  0912    118*    139   K 3.2* 3.4*   * 109   CO2 20* 24   BUN 8 12   CREATININE 0.7 0.8   CALCIUM 7.9* 8.2*   MG 1.9  --      CMP:     Recent Labs  Lab 01/16/18  0529 01/17/18  0912    118*   CALCIUM 7.9* 8.2*   ALBUMIN 2.2*  --    PROT 5.0*  --     139   K 3.2* 3.4*   CO2 20* 24   * 109   BUN 8 12   CREATININE 0.7 0.8   ALKPHOS 105  --    ALT 18  --    AST 17  --    BILITOT 0.5  --      LFTs:     Recent Labs  Lab 01/16/18  0529   ALT 18   AST 17   ALKPHOS 105   BILITOT 0.5   PROT 5.0*   ALBUMIN 2.2*     Coagulation: No results for input(s): LABPROT, INR, APTT in the last 168 hours.

## 2018-01-18 NOTE — CONSULTS
Midline place in right basilic, size [id number] Lot# [id number] Removal date on or before 2/16/18. Needle advanced into vessel with real time ultrasound guidance. Image recorded and saved.

## 2018-01-18 NOTE — PROGRESS NOTES
[facility]  General Surgery  Progress Note    Subjective:     History of Present Illness:  No notes on file    Post-Op Info:  Procedure(s) (LRB):  WHIPPLE (N/A)  EXPLORATION-COMMON BILE DUCT (N/A)  PLACEMENT-TUBE-JEJUNOSTOMY (N/A)   6 Days Post-Op     Interval History: POD #6 whipple, common bile duct exploration and j-tube placement.   No acute events overnight. Vitals stable. Afebrile.  Tolerating tube feeds overnight and FLD- no n/v.  Had multiple bowel movements. Ambulated yesterday.     Medications:  Continuous Infusions:    Scheduled Meds:   bisacodyl  10 mg Rectal Daily    enoxaparin  40 mg Subcutaneous Daily    estradiol  0.5 mg Oral Daily    flecainide  50 mg Oral BID    furosemide  40 mg Intravenous Once    hydrALAZINE  10 mg Oral TID    irbesartan  150 mg Oral BID    pantoprazole  40 mg Intravenous Daily    piperacillin-tazobactam (ZOSYN) IVPB  4.5 g Intravenous Q8H    white petrolatum-mineral oil   Both Eyes QHS     PRN Meds:ALPRAZolam, diphenhydrAMINE, hydrALAZINE, hydrocodone-apap 7.5-325 MG/15 ML, hydrocodone-apap 7.5-325 MG/15 ML, labetalol, ondansetron     Review of patient's allergies indicates:   Allergen Reactions    Aldoril d30 [methyldopa-hydrochlorothiazide]      Itching eyeball    Flagyl [metronidazole hcl] Other (See Comments)     Yeast infection.    Hydrochlorothiazide      Itching eyeball.    Keflex [cephalexin] Diarrhea    Zithromax [azithromycin] Diarrhea     Objective:     Vital Signs (Most Recent):  Temp: 98.2 °F (36.8 °C) (01/18/18 0500)  Pulse: 74 (01/17/18 1554)  Resp: 20 (01/17/18 1554)  BP: 137/64 (01/18/18 0500)  SpO2: 98 % (01/17/18 1554) Vital Signs (24h Range):  Temp:  [97.8 °F (36.6 °C)-98.4 °F (36.9 °C)] 98.2 °F (36.8 °C)  Pulse:  [65-89] 74  Resp:  [12-24] 20  SpO2:  [94 %-100 %] 98 %  BP: (137-166)/(64-77) 137/64     Weight: 73.5 kg (161 lb 15.9 oz)  Body mass index is 28.7 kg/m².    Intake/Output - Last 3 Shifts       01/16 0700 - 01/17  0659 01/17 0700 - 01/18 0659    P.O. 540 120    I.V. (mL/kg) 50 (0.7)     NG/ 140    IV Piggyback 800 100    Total Intake(mL/kg) 1820 (24.8) 360 (4.9)    Urine (mL/kg/hr) 2675 (1.5) 400 (0.2)    Emesis/NG output  0 (0)    Drains 36 (0) 15 (0)    Stool 0 (0) 0 (0)    Blood  0 (0)    Total Output 2711 415    Net -891 -55          Urine Occurrence 2 x 2 x    Stool Occurrence 2 x 1 x    Emesis Occurrence  0 x        Physical Exam   Constitutional: She is oriented to person, place, and time. She appears well-developed and well-nourished. No distress.   HENT:   Head: Normocephalic and atraumatic.   Eyes: EOM are normal. Pupils are equal, round, and reactive to light.   Neck: Normal range of motion. Neck supple.   Cardiovascular: Normal rate, regular rhythm and intact distal pulses.    Pulmonary/Chest: Effort normal. No respiratory distress.   Abdominal: Soft. She exhibits no distension. There is tenderness (at incision site).   Midline incision c/d/i. Jtube in place with no bleeding or oozing from around site. JANET in place 15 cc ss fluid .    Musculoskeletal: Normal range of motion.   Neurological: She is alert and oriented to person, place, and time.   Skin: Skin is warm and dry. She is not diaphoretic.     Significant Labs:  CBC:     Recent Labs  Lab 01/18/18  0409   WBC 9.01   RBC 2.48*   HGB 7.3*   HCT 22.2*      MCV 90   MCH 29.4   MCHC 32.9     BMP:     Recent Labs  Lab 01/16/18  0529 01/17/18  0912    118*    139   K 3.2* 3.4*   * 109   CO2 20* 24   BUN 8 12   CREATININE 0.7 0.8   CALCIUM 7.9* 8.2*   MG 1.9  --      CMP:     Recent Labs  Lab 01/16/18  0529 01/17/18  0912    118*   CALCIUM 7.9* 8.2*   ALBUMIN 2.2*  --    PROT 5.0*  --     139   K 3.2* 3.4*   CO2 20* 24   * 109   BUN 8 12   CREATININE 0.7 0.8   ALKPHOS 105  --    ALT 18  --    AST 17  --    BILITOT 0.5  --      LFTs:     Recent Labs  Lab 01/16/18  0529   ALT 18   AST 17   ALKPHOS 105   BILITOT 0.5    PROT 5.0*   ALBUMIN 2.2*     Coagulation: No results for input(s): LABPROT, INR, APTT in the last 168 hours.      Assessment/Plan:     * Ampullary carcinoma    71 yo male s/p whipple, common bile duct exploration and j-tube placement. On modified pathway.    -QOD labs   -replete lytes as needed  -pulm toilet- IS, duonebs   -PT/OT. Patient needs to be out of bed in chair majority of day. She needs to be walking halls multiple times a day. Will require motivation and encourage from nursing/PT.  -Strict I/Os   -Lasix 40mg today  -Regular diet- advance as tolerated  -TF tonight increase to 50cc from 8pto8a  -Hycet through Jtube  -dvt ppx              Niki James MD  General Surgery  [facility]

## 2018-01-18 NOTE — PT/OT/SLP PROGRESS
Physical Therapy Treatment    Patient Name:  Vonnie Lees   MRN:  [id number]    Recommendations:     Discharge Recommendations:  home with home health   Discharge Equipment Recommendations: walker, rolling   Barriers to discharge: None    Assessment:     Vonnie Lees is a 72 y.o. female admitted with a medical diagnosis of Ampullary carcinoma.  She presents with the following impairments/functional limitations:  weakness, impaired endurance, impaired self care skills, impaired functional mobilty, gait instability. Pt at overall S/SBA for transfers and mobility. Pt safe to mobilize with nursing and family. Pt will continue to benefit from skilled PT to address deficits and increase functional mobility.     Rehab Prognosis:  good; patient would benefit from acute skilled PT services to address these deficits and reach maximum level of function.      Recent Surgery: Procedure(s) (LRB):  WHIPPLE (N/A)  EXPLORATION-COMMON BILE DUCT (N/A)  PLACEMENT-TUBE-JEJUNOSTOMY (N/A) 6 Days Post-Op    Plan:     During this hospitalization, patient to be seen 4 x/week to address the above listed problems via gait training, therapeutic activities, therapeutic exercises  · Plan of Care Expires:  02/13/18   Plan of Care Reviewed with: patient    Subjective     Communicated with RN prior to session.  Patient found in bed upon PT entry to room, agreeable to treatment.      Chief Complaint: leaking from abdomen  Patient comments/goals: get back home  Pain/Comfort:  · Pain Rating 1: 0/10  · Pain Rating Post-Intervention 1: 0/10    Patients cultural, spiritual, Quaker conflicts given the current situation: none noted    Objective:     Patient found with: pulse ox (continuous), telemetry, JANET drain     General Precautions: Standard, fall   Orthopedic Precautions:N/A   Braces: N/A     Functional Mobility:  · Bed Mobility:     · Supine to Sit: supervision  · Transfers:     · Sit to Stand:  stand by assistance with rolling walker  · Gait:  Pt ambulated 250 feet with RW and S. No LOB.       AM-PAC 6 CLICK MOBILITY  Turning over in bed (including adjusting bedclothes, sheets and blankets)?: 3  Sitting down on and standing up from a chair with arms (e.g., wheelchair, bedside commode, etc.): 3  Moving from lying on back to sitting on the side of the bed?: 3  Moving to and from a bed to a chair (including a wheelchair)?: 3  Need to walk in hospital room?: 3  Climbing 3-5 steps with a railing?: 2  Total Score: 17     Therapeutic Activities and Exercises:   Pt performed bilateral lower extremity therex x 20 reps. Therex including seated hip flexion, long arc quads, ankle pumps, gluteal sets.      Patient left up in chair with all lines intact, call button in reach, RN notified and family present..    GOALS:    Physical Therapy Goals        Problem: Physical Therapy Goal    Goal Priority Disciplines Outcome Goal Variances Interventions   Physical Therapy Goal     PT/OT, PT Ongoing (interventions implemented as appropriate)     Description:  Goals to be met by: 2018     Patient will increase functional independence with mobility by performin. Supine to sit with Modified [place]-not met  2. Rolling to Right with Modified [place].-not met  3. Sit to stand transfer with Supervision-not met  4. Gait  x 50 feet with Supervision-not met  5. Pt able to perform seated therex x 20 reps BLE with supervision.                         Time Tracking:     PT Received On: 18  PT Start Time: 1130     PT Stop Time: 1153  PT Total Time (min): 23 min     Billable Minutes: Gait Training 13 and Therapeutic Exercise 10    Treatment Type: Treatment  PT/PTA: PT     PTA Visit Number: 0     Jessica Tirado, PT  2018

## 2018-01-18 NOTE — PROGRESS NOTES
Care plan reviewed and understood by patient. Resp rate even and unlabored. VSS. Right abdominal JANET drain intact and patent. G tube intact and patent. J tube intact and patent. Patient tolerating diet with no reports of nausea. Patient remain free of falls. No acute pain or distress noted.

## 2018-01-18 NOTE — ASSESSMENT & PLAN NOTE
73 yo male s/p whipple, common bile duct exploration and j-tube placement. On modified pathway.    -QOD labs   -replete lytes as needed  -pulm toilet- IS, ramses   -PT/OT. Patient needs to be out of bed in chair majority of day. She needs to be walking halls multiple times a day. Will require motivation and encourage from nursing/PT.  -Strict I/Os   -Lasix 40mg today  -Regular diet- advance as tolerated  -TF tonight increase to 50cc from 8pto8a  -Hycet through Jtube  -dvt ppx

## 2018-01-18 NOTE — PLAN OF CARE
Problem: Physical Therapy Goal  Goal: Physical Therapy Goal  Goals to be met by: 2018     Patient will increase functional independence with mobility by performin. Supine to sit with Modified [place]-not met  2. Rolling to Right with Modified [place].-not met  3. Sit to stand transfer with Supervision-not met  4. Gait  x 50 feet with Supervision-not met  5. Pt able to perform seated therex x 20 reps BLE with supervision.        Vonnie Lees is a 72 y.o. female admitted with a medical diagnosis of Ampullary carcinoma.  She presents with the following impairments/functional limitations:  weakness, impaired endurance, impaired self care skills, impaired functional mobilty, gait instability. Pt at overall S/SBA for transfers and mobility. Pt safe to mobilize with nursing and family. Pt will continue to benefit from skilled PT to address deficits and increase functional mobility.

## 2018-01-19 LAB
ALBUMIN SERPL BCP-MCNC: 2.2 G/DL
ALP SERPL-CCNC: 103 U/L
ALT SERPL W/O P-5'-P-CCNC: 19 U/L
ANION GAP SERPL CALC-SCNC: 10 MMOL/L
AST SERPL-CCNC: 16 U/L
BACTERIA SPEC ANAEROBE CULT: NORMAL
BILIRUB SERPL-MCNC: 0.3 MG/DL
BUN SERPL-MCNC: 13 MG/DL
C DIFF GDH STL QL: NEGATIVE
C DIFF TOX A+B STL QL IA: NEGATIVE
CALCIUM SERPL-MCNC: 7.8 MG/DL
CHLORIDE SERPL-SCNC: 110 MMOL/L
CO2 SERPL-SCNC: 20 MMOL/L
CREAT SERPL-MCNC: 0.8 MG/DL
EST. GFR  (AFRICAN AMERICAN): >60 ML/MIN/1.73 M^2
EST. GFR  (NON AFRICAN AMERICAN): >60 ML/MIN/1.73 M^2
GLUCOSE SERPL-MCNC: 120 MG/DL
MAGNESIUM SERPL-MCNC: 2 MG/DL
PHOSPHATE SERPL-MCNC: 3 MG/DL
POTASSIUM SERPL-SCNC: 3.2 MMOL/L
PROT SERPL-MCNC: 4.7 G/DL
SODIUM SERPL-SCNC: 140 MMOL/L

## 2018-01-19 PROCEDURE — 63600175 PHARM REV CODE 636 W HCPCS: Performed by: SURGERY

## 2018-01-19 PROCEDURE — 20600001 HC STEP DOWN PRIVATE ROOM

## 2018-01-19 PROCEDURE — 97530 THERAPEUTIC ACTIVITIES: CPT

## 2018-01-19 PROCEDURE — 25000003 PHARM REV CODE 250: Performed by: SURGERY

## 2018-01-19 PROCEDURE — 87449 NOS EACH ORGANISM AG IA: CPT

## 2018-01-19 PROCEDURE — 25000003 PHARM REV CODE 250: Performed by: NURSE PRACTITIONER

## 2018-01-19 PROCEDURE — 97803 MED NUTRITION INDIV SUBSEQ: CPT

## 2018-01-19 PROCEDURE — S0028 INJECTION, FAMOTIDINE, 20 MG: HCPCS | Performed by: NURSE PRACTITIONER

## 2018-01-19 PROCEDURE — 25000003 PHARM REV CODE 250: Performed by: STUDENT IN AN ORGANIZED HEALTH CARE EDUCATION/TRAINING PROGRAM

## 2018-01-19 RX ORDER — METOCLOPRAMIDE HYDROCHLORIDE 5 MG/5ML
10 SOLUTION ORAL EVERY 6 HOURS
Status: DISCONTINUED | OUTPATIENT
Start: 2018-01-19 | End: 2018-01-23

## 2018-01-19 RX ORDER — HYDROCODONE BITARTRATE AND ACETAMINOPHEN 7.5; 325 MG/15ML; MG/15ML
15 SOLUTION ORAL EVERY 6 HOURS PRN
Status: DISCONTINUED | OUTPATIENT
Start: 2018-01-19 | End: 2018-01-23 | Stop reason: HOSPADM

## 2018-01-19 RX ORDER — POTASSIUM CHLORIDE 20 MEQ/15ML
40 SOLUTION ORAL ONCE
Status: COMPLETED | OUTPATIENT
Start: 2018-01-19 | End: 2018-01-19

## 2018-01-19 RX ORDER — FAMOTIDINE 10 MG/ML
20 INJECTION INTRAVENOUS DAILY
Status: DISCONTINUED | OUTPATIENT
Start: 2018-01-19 | End: 2018-01-23 | Stop reason: HOSPADM

## 2018-01-19 RX ORDER — HYDROCODONE BITARTRATE AND ACETAMINOPHEN 7.5; 325 MG/15ML; MG/15ML
30 SOLUTION ORAL EVERY 6 HOURS PRN
Status: DISCONTINUED | OUTPATIENT
Start: 2018-01-19 | End: 2018-01-23 | Stop reason: HOSPADM

## 2018-01-19 RX ORDER — ERYTHROMYCIN ETHYLSUCCINATE 200 MG/5ML
200 SUSPENSION ORAL EVERY 6 HOURS
Status: DISCONTINUED | OUTPATIENT
Start: 2018-01-19 | End: 2018-01-23

## 2018-01-19 RX ADMIN — FLECAINIDE ACETATE 50 MG: 50 TABLET ORAL at 09:01

## 2018-01-19 RX ADMIN — FAMOTIDINE 20 MG: 10 INJECTION, SOLUTION INTRAVENOUS at 01:01

## 2018-01-19 RX ADMIN — HYDRALAZINE HYDROCHLORIDE 10 MG: 10 TABLET, FILM COATED ORAL at 01:01

## 2018-01-19 RX ADMIN — PANTOPRAZOLE SODIUM 40 MG: 40 TABLET, DELAYED RELEASE ORAL at 09:01

## 2018-01-19 RX ADMIN — HYDROCODONE BITARTRATE AND ACETAMINOPHEN 30 ML: 7.5; 325 SOLUTION ORAL at 07:01

## 2018-01-19 RX ADMIN — IRBESARTAN 150 MG: 75 TABLET ORAL at 09:01

## 2018-01-19 RX ADMIN — HYDRALAZINE HYDROCHLORIDE 10 MG: 10 TABLET, FILM COATED ORAL at 09:01

## 2018-01-19 RX ADMIN — ESTRADIOL 0.5 MG: 0.5 TABLET ORAL at 09:01

## 2018-01-19 RX ADMIN — PIPERACILLIN SODIUM AND TAZOBACTAM SODIUM 4.5 G: 4; .5 INJECTION, POWDER, LYOPHILIZED, FOR SOLUTION INTRAVENOUS at 03:01

## 2018-01-19 RX ADMIN — ERYTHROMYCIN ETHYLSUCCINATE 200 MG: 200 GRANULE, FOR SUSPENSION ORAL at 05:01

## 2018-01-19 RX ADMIN — ENOXAPARIN SODIUM 40 MG: 100 INJECTION SUBCUTANEOUS at 05:01

## 2018-01-19 RX ADMIN — METOCLOPRAMIDE HYDROCHLORIDE 10 MG: 5 SOLUTION ORAL at 05:01

## 2018-01-19 RX ADMIN — HYDRALAZINE HYDROCHLORIDE 10 MG: 10 TABLET, FILM COATED ORAL at 06:01

## 2018-01-19 RX ADMIN — ERYTHROMYCIN ETHYLSUCCINATE 200 MG: 200 GRANULE, FOR SUSPENSION ORAL at 01:01

## 2018-01-19 RX ADMIN — METOCLOPRAMIDE HYDROCHLORIDE 10 MG: 5 SOLUTION ORAL at 01:01

## 2018-01-19 RX ADMIN — ONDANSETRON 4 MG: 2 INJECTION INTRAMUSCULAR; INTRAVENOUS at 12:01

## 2018-01-19 RX ADMIN — POTASSIUM CHLORIDE 40 MEQ: 20 SOLUTION ORAL at 09:01

## 2018-01-19 RX ADMIN — HYDROCODONE BITARTRATE AND ACETAMINOPHEN 30 ML: 7.5; 325 SOLUTION ORAL at 01:01

## 2018-01-19 RX ADMIN — HYDROCODONE BITARTRATE AND ACETAMINOPHEN 30 ML: 7.5; 325 SOLUTION ORAL at 12:01

## 2018-01-19 RX ADMIN — MINERAL OIL AND WHITE PETROLATUM: 150; 830 OINTMENT OPHTHALMIC at 09:01

## 2018-01-19 NOTE — ASSESSMENT & PLAN NOTE
71 yo male s/p whipple, common bile duct exploration and j-tube placement.     -QOD labs   -replete lytes as needed  -pulm toilet- IS, ramses   -PT/OT. Patient needs to be out of bed in chair majority of day. She needs to be walking halls multiple times a day. Will require motivation and encourage from nursing/PT.  -Strict I/Os   -Started erythromycin through J  -Regular  -Tube feeds held overnight. Will restart feeds tonight at 60cc from 8pto8a  -Hycet through Jtube  -dvt ppx    -Cdiff tested for multiple loose bm. Pending.

## 2018-01-19 NOTE — PT/OT/SLP PROGRESS
Occupational Therapy   Treatment/Discharge Summary    Name: Vonnie Lees  MRN: [id number]  Admitting Diagnosis:  Ampullary carcinoma  7 Days Post-Op    Recommendations:     Discharge Recommendations: home with home health  Discharge Equipment Recommendations:  walker, rolling  Barriers to discharge:  None    Subjective     Communicated with: RN prior to session.  Pain/Comfort:  · Pain Rating 1: 0/10  · Pain Rating Post-Intervention 1: 0/10    Objective:     Patient found with: telemetry    General Precautions: Standard, fall   Orthopedic Precautions:N/A   Braces: N/A     Occupational Performance:      Functional Mobility/Transfers:  · Patient completed Sit <> Stand Transfer with modified independence  with  rolling walker     Activities of Daily Living:  · Bathing: minimum assistance to bathe LB 2/2 abdominal discomfort w/ trunk flexion   · UB Dressing: independence   · LB Dressing: minimum assistance 2/2 discomfort w/ abdominal discomfort. Pt educated on LB dressing strategy to facilitate task process, limit abdominal pain with trunk flexion and promote functional independence.    Patient left [facility] with  present    [facility] 6 Click:  [facility] Total Score: 22    Treatment & Education:  Pt educated on OT POC/ d/c from acute OT services at this time. Pt encouraged to continue ambulating in halls with  and/or nursing. Assist levels and educated for ADLs noted above. Pt reports feeling safe and comfortable performing ADLs and functional mobility tasks. Pt and  had no questions or concerns at this time.   Education:    Assessment:     Vonnie Lees is a 72 y.o. female with a medical diagnosis of Ampullary carcinoma.  She presents with no acute OT needs at this time and is safe to d/c from acute OT and home once medically stable. Pt tolerated session well and presented no signs of LOB or SOB while ambulating in halls.     Rehab Prognosis:  Good; patient would benefit from acute skilled OT services to  address these deficits and reach maximum level of function.       Plan:       d/c from acute OT services   · Plan of Care Expires: 02/12/18  · Plan of Care Reviewed with: patient, spouse    This Plan of care has been discussed with the patient who was involved in its development and understands and is in agreement with the identified goals and treatment plan    GOALS:    Occupational Therapy Goals     Not on file          Multidisciplinary Problems (Resolved)        Problem: Occupational Therapy Goal    Goal Priority Disciplines Outcome Interventions   Occupational Therapy Goal   (Resolved)     OT, PT/OT Outcome(s) achieved    Description:  Goals to be met by: 7 days      Patient will increase functional independence with ADLs by performing:    UE Dressing with Supervision. -- Met 1/17  LE Dressing with Supervision.   Grooming while standing with Supervision. -- Met   Toileting from toilet with Supervision for hygiene and clothing management.  -- met   Supine to sit with Supervision. -- met  Stand pivot transfers with Supervision. -- Met 1/17  Toilet transfer to toilet with Supervision. -- met                         Time Tracking:     OT Date of Treatment: 01/19/18  OT Start Time: 1052  OT Stop Time: 1103  OT Total Time (min): 11 min    Billable Minutes:Therapeutic Activity 11    Ilene Charlton OT  1/19/2018

## 2018-01-19 NOTE — PLAN OF CARE
Problem: Patient Care Overview  Goal: Plan of Care Review  Outcome: Ongoing (interventions implemented as appropriate)  Patient is alert, awake, and oriented. Complains of pain. PRN pain medicine is available. VS stable. Unable to tolerate tube feedings overnight. JANET to abdomen. Verbalizes understanding of plan of care. Nurse will continue to monitor.

## 2018-01-19 NOTE — PLAN OF CARE
Patient is NPO, Cdiff pending and not expected to discharge until early next week with home health and tube feeds.  Will continue to follow for needs.       01/19/18 1141   Discharge Reassessment   Assessment Type Discharge Planning Reassessment   Discharge Plan A Home with family;Home Health

## 2018-01-19 NOTE — PLAN OF CARE
SW learned in morning huddle that Pt had nausea and diarrhea overnight and they are testing for c-diff. As a result, Pt's discharge has been backed up. SW notified Renaldo with [facility] and [facility].     [place] LCSW

## 2018-01-19 NOTE — PROGRESS NOTES
Patient has tube feeding Isosource 1.5 @ 50cc/hr. Patient is has nausea and vomiting. IV Zofran administered. Per dr. Chou hold tube feedings overnight. Nurse will continue to monitor.

## 2018-01-19 NOTE — PLAN OF CARE
Problem: Occupational Therapy Goal  Goal: Occupational Therapy Goal  Goals to be met by: 7 days      Patient will increase functional independence with ADLs by performing:    UE Dressing with Supervision. -- Met 1/17  LE Dressing with Supervision.   Grooming while standing with Supervision. -- Met   Toileting from toilet with Supervision for hygiene and clothing management.  -- met   Supine to sit with Supervision. -- met  Stand pivot transfers with Supervision. -- Met 1/17  Toilet transfer to toilet with Supervision. -- met       Outcome: Outcome(s) achieved Date Met: 01/19/18  Pt has accomplished satisfactory goal achievement this date and is safe to d/c from acute OT services at this time.     Comments: D/c from acute OT

## 2018-01-19 NOTE — SUBJECTIVE & OBJECTIVE
Interval History: POD #7 whipple, common bile duct exploration and j-tube placement.   Episode of nausea and emesis overnight. KUB with no evidence of ileus or obstruction. Tube feeds held. Early in day was tolerating regular diet. Has had multiple episodes of loose bowel movement.     Medications:  Continuous Infusions:    Scheduled Meds:   bisacodyl  10 mg Rectal Daily    enoxaparin  40 mg Subcutaneous Daily    estradiol  0.5 mg Oral Daily    flecainide  50 mg Oral BID    hydrALAZINE  10 mg Oral TID    irbesartan  150 mg Oral BID    metoclopramide HCl  10 mg Per J Tube Q6H    pantoprazole  40 mg Oral Daily    white petrolatum-mineral oil   Both Eyes QHS     PRN Meds:ALPRAZolam, calcium carbonate, diphenhydrAMINE, hydrALAZINE, hydrocodone-apap 7.5-325 MG/15 ML, hydrocodone-apap 7.5-325 MG/15 ML, labetalol, ondansetron     Review of patient's allergies indicates:   Allergen Reactions    Aldoril d30 [methyldopa-hydrochlorothiazide]      Itching eyeball    Flagyl [metronidazole hcl] Other (See Comments)     Yeast infection.    Hydrochlorothiazide      Itching eyeball.    Keflex [cephalexin] Diarrhea    Zithromax [azithromycin] Diarrhea     Objective:     Vital Signs (Most Recent):  Temp: 97.8 °F (36.6 °C) (01/19/18 0419)  Pulse: 69 (01/19/18 0419)  Resp: 18 (01/19/18 0419)  BP: 133/63 (01/19/18 0419)  SpO2: (!) 93 % (01/19/18 0419) Vital Signs (24h Range):  Temp:  [96.7 °F (35.9 °C)-99 °F (37.2 °C)] 97.8 °F (36.6 °C)  Pulse:  [65-72] 69  Resp:  [18] 18  SpO2:  [91 %-97 %] 93 %  BP: (133-171)/(63-74) 133/63     Weight: 73.5 kg (161 lb 15.9 oz)  Body mass index is 28.7 kg/m².    Intake/Output - Last 3 Shifts       01/17 0700 - 01/18 0659 01/18 0700 - 01/19 0659 01/19 0700 - 01/20 0659    P.O. 120 2055     I.V. (mL/kg)  0 (0)     NG/ 240     IV Piggyback 100 300     Total Intake(mL/kg) 360 (4.9) 2595 (35.3)     Urine (mL/kg/hr) 400 (0.2) 250 (0.1)     Emesis/NG output 0 (0) 250 (0.1)     Drains 15 (0)  115 (0.1)     Stool 0 (0) 0 (0)     Blood 0 (0) 0 (0)     Total Output 415 615      Net -55 +1980             Urine Occurrence 2 x 8 x     Stool Occurrence 1 x 7 x     Emesis Occurrence 0 x 1 x         Physical Exam   Constitutional: She is oriented to person, place, and time. She appears well-developed and well-nourished. No distress.   HENT:   Head: Normocephalic and atraumatic.   Eyes: EOM are normal. Pupils are equal, round, and reactive to light.   Neck: Normal range of motion. Neck supple.   Cardiovascular: Normal rate, regular rhythm and intact distal pulses.    Pulmonary/Chest: Effort normal. No respiratory distress.   Abdominal: Soft. She exhibits no distension. There is tenderness (at incision site).   Midline incision c/d/i. Jtube in place with serous oozing from around site. JANET in place 25 cc ss fluid .    Musculoskeletal: Normal range of motion.   Neurological: She is alert and oriented to person, place, and time.   Skin: Skin is warm and dry. She is not diaphoretic.     Significant Labs:  CBC:     Recent Labs  Lab 01/18/18  0409   WBC 9.01   RBC 2.48*   HGB 7.3*   HCT 22.2*      MCV 90   MCH 29.4   MCHC 32.9     BMP:     Recent Labs  Lab 01/18/18  0409   *      K 3.2*      CO2 20*   BUN 13   CREATININE 0.8   CALCIUM 7.8*   MG 2.0     CMP:     Recent Labs  Lab 01/18/18  0409   *   CALCIUM 7.8*   ALBUMIN 2.2*   PROT 4.7*      K 3.2*   CO2 20*      BUN 13   CREATININE 0.8   ALKPHOS 103   ALT 19   AST 16   BILITOT 0.3     LFTs:     Recent Labs  Lab 01/18/18  0409   ALT 19   AST 16   ALKPHOS 103   BILITOT 0.3   PROT 4.7*   ALBUMIN 2.2*     Coagulation: No results for input(s): LABPROT, INR, APTT in the last 168 hours.

## 2018-01-19 NOTE — PROGRESS NOTES
[facility]  General Surgery  Progress Note    Subjective:     History of Present Illness:  No notes on file    Post-Op Info:  Procedure(s) (LRB):  WHIPPLE (N/A)  EXPLORATION-COMMON BILE DUCT (N/A)  PLACEMENT-TUBE-JEJUNOSTOMY (N/A)   7 Days Post-Op     Interval History: POD #7 whipple, common bile duct exploration and j-tube placement.   Episode of nausea and emesis overnight. KUB with no evidence of ileus or obstruction. Tube feeds held. Early in day was tolerating regular diet. Has had multiple episodes of loose bowel movement.     Medications:  Continuous Infusions:    Scheduled Meds:   bisacodyl  10 mg Rectal Daily    enoxaparin  40 mg Subcutaneous Daily    estradiol  0.5 mg Oral Daily    flecainide  50 mg Oral BID    hydrALAZINE  10 mg Oral TID    irbesartan  150 mg Oral BID    metoclopramide HCl  10 mg Per J Tube Q6H    pantoprazole  40 mg Oral Daily    white petrolatum-mineral oil   Both Eyes QHS     PRN Meds:ALPRAZolam, calcium carbonate, diphenhydrAMINE, hydrALAZINE, hydrocodone-apap 7.5-325 MG/15 ML, hydrocodone-apap 7.5-325 MG/15 ML, labetalol, ondansetron     Review of patient's allergies indicates:   Allergen Reactions    Aldoril d30 [methyldopa-hydrochlorothiazide]      Itching eyeball    Flagyl [metronidazole hcl] Other (See Comments)     Yeast infection.    Hydrochlorothiazide      Itching eyeball.    Keflex [cephalexin] Diarrhea    Zithromax [azithromycin] Diarrhea     Objective:     Vital Signs (Most Recent):  Temp: 97.8 °F (36.6 °C) (01/19/18 0419)  Pulse: 69 (01/19/18 0419)  Resp: 18 (01/19/18 0419)  BP: 133/63 (01/19/18 0419)  SpO2: (!) 93 % (01/19/18 0419) Vital Signs (24h Range):  Temp:  [96.7 °F (35.9 °C)-99 °F (37.2 °C)] 97.8 °F (36.6 °C)  Pulse:  [65-72] 69  Resp:  [18] 18  SpO2:  [91 %-97 %] 93 %  BP: (133-171)/(63-74) 133/63     Weight: 73.5 kg (161 lb 15.9 oz)  Body mass index is 28.7 kg/m².    Intake/Output - Last 3 Shifts       01/17 0700 - 01/18 0659  01/18 0700 - 01/19 0659 01/19 0700 - 01/20 0659    P.O. 120 2055     I.V. (mL/kg)  0 (0)     NG/ 240     IV Piggyback 100 300     Total Intake(mL/kg) 360 (4.9) 2595 (35.3)     Urine (mL/kg/hr) 400 (0.2) 250 (0.1)     Emesis/NG output 0 (0) 250 (0.1)     Drains 15 (0) 115 (0.1)     Stool 0 (0) 0 (0)     Blood 0 (0) 0 (0)     Total Output 415 615      Net -55 +1980             Urine Occurrence 2 x 8 x     Stool Occurrence 1 x 7 x     Emesis Occurrence 0 x 1 x         Physical Exam   Constitutional: She is oriented to person, place, and time. She appears well-developed and well-nourished. No distress.   HENT:   Head: Normocephalic and atraumatic.   Eyes: EOM are normal. Pupils are equal, round, and reactive to light.   Neck: Normal range of motion. Neck supple.   Cardiovascular: Normal rate, regular rhythm and intact distal pulses.    Pulmonary/Chest: Effort normal. No respiratory distress.   Abdominal: Soft. She exhibits no distension. There is tenderness (at incision site).   Midline incision c/d/i. Jtube in place with serous oozing from around site. JANET in place 25 cc ss fluid .    Musculoskeletal: Normal range of motion.   Neurological: She is alert and oriented to person, place, and time.   Skin: Skin is warm and dry. She is not diaphoretic.     Significant Labs:  CBC:     Recent Labs  Lab 01/18/18  0409   WBC 9.01   RBC 2.48*   HGB 7.3*   HCT 22.2*      MCV 90   MCH 29.4   MCHC 32.9     BMP:     Recent Labs  Lab 01/18/18  0409   *      K 3.2*      CO2 20*   BUN 13   CREATININE 0.8   CALCIUM 7.8*   MG 2.0     CMP:     Recent Labs  Lab 01/18/18  0409   *   CALCIUM 7.8*   ALBUMIN 2.2*   PROT 4.7*      K 3.2*   CO2 20*      BUN 13   CREATININE 0.8   ALKPHOS 103   ALT 19   AST 16   BILITOT 0.3     LFTs:     Recent Labs  Lab 01/18/18  0409   ALT 19   AST 16   ALKPHOS 103   BILITOT 0.3   PROT 4.7*   ALBUMIN 2.2*     Coagulation: No results for input(s): LABPROT, INR,  APTT in the last 168 hours.      Assessment/Plan:     * Ampullary carcinoma    71 yo male s/p whipple, common bile duct exploration and j-tube placement.     -QOD labs   -replete lytes as needed  -pulm toilet- IS, duonebs   -PT/OT. Patient needs to be out of bed in chair majority of day. She needs to be walking halls multiple times a day. Will require motivation and encourage from nursing/PT.  -Strict I/Os   -Started erythromycin through J  -Regular  -Tube feeds held overnight. Will restart feeds tonight at 60cc from 8pto8a  -Hycet through Jtube  -dvt ppx    -Cdiff tested for multiple loose bm. Pending.            Niki James MD  General Surgery  [facility]

## 2018-01-19 NOTE — PLAN OF CARE
Problem: Patient Care Overview  Goal: Plan of Care Review  Outcome: Ongoing (interventions implemented as appropriate)  Plan of care discussed with pt. Pt verbalizes understanding. Pt tolerating regular diet. Pain managed with PRN pain medications. Pt ambulated in lowery, sat up in chair and ambulated to bedside commode. Pt needs assistance to re position. VS stable. Pt remains free of falls and injury. Pt had multiple loose BM's. Pt's J tube leaking serosanguinous drainage. Changed pt's j tube dressing d/t complete saturation 5x/shift. Notified Dr James of multiple BM's and saturated dressings.  reported we will cont to monitor. Will continue to monitor.

## 2018-01-19 NOTE — PROGRESS NOTES
[facility]  Adult Nutrition  Progress Note    SUMMARY     Recommendations  Recommendation/Intervention:   1. As medically able, resume nocturnal TF and increase to goal rate of 80 mL/hr from 8p-8a - to provide 1440 kcal/day, 65 g protein/day, and 733 mL free fluid/day.   2. As medically appropriate, ADAT to Regular with texture per SLP.   RD to monitor.    Goals: Pt to receive nutrition by RD follow-up.   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 Did not tolerate TF last night, N/V. Possible C.diff.  Nutrition Discharge Planning: Adequate nutrition via TF + PO intake    Nutrition Prescription Ordered  Current Diet Order: NPO  Current Nutrition Support Formula Ordered: Isosource 1.5  Current Nutrition Support Rate Ordered: 70 (ml)  Current Nutrition Support Frequency Ordered: mL/hr from 8p-8a      Evaluation of Received Nutrients/Fluid Intake  Enteral Calories (kcal): 1260  Enteral Protein (gm): 57  Enteral (Free Water) Fluid (mL): 642  Energy Calories Required: not meeting needs  % Kcal Needs: 69  Protein Required: not meeting needs  % Protein Needs: 65  I/O: +10.2L since admit  Fluid Required: not meeting needs  Comments: LBM 1/18  Tolerance: not tolerating  % Intake of Estimated Energy Needs: 0 - 25 %  % Meal Intake: NPO     Nutrition/Diet History  Food Preferences: No cultural/[employer] needs identified at this time.  Factors Affecting Nutritional Intake: altered gastrointestinal function, nausea/vomiting    Labs/Tests/Procedures/Meds   Pertinent Labs Reviewed: reviewed  Pertinent Labs Comments: K 3.2, Glu 120, Ca 7.8, Alb 2.2, CRP 84  Pertinent  "Medications Reviewed: reviewed  Pertinent Medications Comments: reglan, pantoprazole    Physical Findings  Overall Physical Appearance: overweight  Tubes: gastrostomy tube, jejunostomy tube  Oral/Mouth Cavity: tooth/teeth missing  Skin: edema, incision    Anthropometrics  Height: 5' 3" (160 cm)  Weight Method: Stated  Weight: 73.5 kg (161 lb 15.9 oz)  Ideal Body Weight (IBW), Female: 115 lb  % Ideal Body Weight, Female (lb): 140.87 lb  BMI (Calculated): 28.8  BMI Grade: 25 - 29.9 - overweight  Usual Body Weight (UBW), k.4 kg (2017 per chart review)  % Usual Body Weight: 98.97  % Weight Change From Usual Weight: -1.23 %    Estimated/Assessed Needs  Weight Used For Calorie Calculations: 73.5 kg (162 lb 0.6 oz)   Energy Calorie Requirements (kcal): 7211-3082  Energy Need Method: Kcal/kg (25-30)  RMR (Grand-St. Jeor Equation): 1204.12  Weight Used For Protein Calculations: 73.5 kg (162 lb 0.6 oz)  Protein Requirements:  g/day (1.2-1.4 g/kg)  Fluid Requirements (mL): 1 mL/kcal or per MD  Fluid Need Method: RDA Method  RDA Method (mL):     Assessment and Plan  * Ampullary carcinoma    Nutrition Problem  Inadequate energy intake    Related to (etiology):   Decreased ability to consume sufficient energy    Signs and Symptoms (as evidenced by):   NPO with no alternative means of nutrition at this time    Nutrition Diagnosis Status:   Continues          Monitor and Evaluation  Food and Nutrient Intake: energy intake, food and beverage intake, enteral nutrition intake  Food and Nutrient Adminstration: diet order, enteral and parenteral nutrition administration  Physical Activity and Function: nutrition-related ADLs and IADLs  Anthropometric Measurements: weight, weight change  Biochemical Data, Medical Tests and Procedures: electrolyte and renal panel, gastrointestinal profile, inflammatory profile  Nutrition-Focused Physical Findings: overall appearance    Nutrition Risk  Level of Risk:  " (2x/week)    Nutrition Follow-Up  RD Follow-up?: Yes

## 2018-01-20 LAB
ALBUMIN SERPL BCP-MCNC: 2.1 G/DL
ALP SERPL-CCNC: 102 U/L
ALT SERPL W/O P-5'-P-CCNC: 18 U/L
ANION GAP SERPL CALC-SCNC: 9 MMOL/L
AST SERPL-CCNC: 13 U/L
BASOPHILS # BLD AUTO: 0.03 K/UL
BASOPHILS NFR BLD: 0.3 %
BILIRUB SERPL-MCNC: 0.2 MG/DL
BUN SERPL-MCNC: 11 MG/DL
CA-I BLDV-SCNC: 1.13 MMOL/L
CALCIUM SERPL-MCNC: 8.2 MG/DL
CHLORIDE SERPL-SCNC: 110 MMOL/L
CO2 SERPL-SCNC: 22 MMOL/L
CREAT SERPL-MCNC: 0.7 MG/DL
DIFFERENTIAL METHOD: ABNORMAL
EOSINOPHIL # BLD AUTO: 0.4 K/UL
EOSINOPHIL NFR BLD: 3.3 %
ERYTHROCYTE [DISTWIDTH] IN BLOOD BY AUTOMATED COUNT: 14 %
EST. GFR  (AFRICAN AMERICAN): >60 ML/MIN/1.73 M^2
EST. GFR  (NON AFRICAN AMERICAN): >60 ML/MIN/1.73 M^2
GLUCOSE SERPL-MCNC: 123 MG/DL
HCT VFR BLD AUTO: 23.8 %
HGB BLD-MCNC: 7.6 G/DL
IMM GRANULOCYTES # BLD AUTO: 0.21 K/UL
IMM GRANULOCYTES NFR BLD AUTO: 2 %
LYMPHOCYTES # BLD AUTO: 2.3 K/UL
LYMPHOCYTES NFR BLD: 21.4 %
MAGNESIUM SERPL-MCNC: 1.7 MG/DL
MCH RBC QN AUTO: 29.1 PG
MCHC RBC AUTO-ENTMCNC: 31.9 G/DL
MCV RBC AUTO: 91 FL
MONOCYTES # BLD AUTO: 0.9 K/UL
MONOCYTES NFR BLD: 8 %
NEUTROPHILS # BLD AUTO: 7 K/UL
NEUTROPHILS NFR BLD: 65 %
NRBC BLD-RTO: 0 /100 WBC
PHOSPHATE SERPL-MCNC: 2.7 MG/DL
PLATELET # BLD AUTO: 232 K/UL
PMV BLD AUTO: 10.2 FL
POTASSIUM SERPL-SCNC: 3.8 MMOL/L
PROT SERPL-MCNC: 5 G/DL
RBC # BLD AUTO: 2.61 M/UL
SODIUM SERPL-SCNC: 141 MMOL/L
WBC # BLD AUTO: 10.71 K/UL

## 2018-01-20 PROCEDURE — 63600175 PHARM REV CODE 636 W HCPCS: Performed by: NURSE PRACTITIONER

## 2018-01-20 PROCEDURE — 25000003 PHARM REV CODE 250: Performed by: NURSE PRACTITIONER

## 2018-01-20 PROCEDURE — S0028 INJECTION, FAMOTIDINE, 20 MG: HCPCS | Performed by: NURSE PRACTITIONER

## 2018-01-20 PROCEDURE — 85025 COMPLETE CBC W/AUTO DIFF WBC: CPT

## 2018-01-20 PROCEDURE — 36415 COLL VENOUS BLD VENIPUNCTURE: CPT

## 2018-01-20 PROCEDURE — 83735 ASSAY OF MAGNESIUM: CPT

## 2018-01-20 PROCEDURE — 20600001 HC STEP DOWN PRIVATE ROOM

## 2018-01-20 PROCEDURE — 25000003 PHARM REV CODE 250: Performed by: STUDENT IN AN ORGANIZED HEALTH CARE EDUCATION/TRAINING PROGRAM

## 2018-01-20 PROCEDURE — 63600175 PHARM REV CODE 636 W HCPCS: Performed by: STUDENT IN AN ORGANIZED HEALTH CARE EDUCATION/TRAINING PROGRAM

## 2018-01-20 PROCEDURE — 80053 COMPREHEN METABOLIC PANEL: CPT

## 2018-01-20 PROCEDURE — 82330 ASSAY OF CALCIUM: CPT

## 2018-01-20 PROCEDURE — 25000003 PHARM REV CODE 250: Performed by: SURGERY

## 2018-01-20 PROCEDURE — 84100 ASSAY OF PHOSPHORUS: CPT

## 2018-01-20 PROCEDURE — 63600175 PHARM REV CODE 636 W HCPCS: Performed by: SURGERY

## 2018-01-20 RX ORDER — MORPHINE SULFATE 4 MG/ML
2 INJECTION, SOLUTION INTRAMUSCULAR; INTRAVENOUS EVERY 4 HOURS PRN
Status: DISCONTINUED | OUTPATIENT
Start: 2018-01-20 | End: 2018-01-23 | Stop reason: HOSPADM

## 2018-01-20 RX ADMIN — HYDROCODONE BITARTRATE AND ACETAMINOPHEN 30 ML: 7.5; 325 SOLUTION ORAL at 09:01

## 2018-01-20 RX ADMIN — FAMOTIDINE 20 MG: 10 INJECTION, SOLUTION INTRAVENOUS at 09:01

## 2018-01-20 RX ADMIN — ENOXAPARIN SODIUM 40 MG: 100 INJECTION SUBCUTANEOUS at 04:01

## 2018-01-20 RX ADMIN — METOCLOPRAMIDE HYDROCHLORIDE 10 MG: 5 SOLUTION ORAL at 12:01

## 2018-01-20 RX ADMIN — IRBESARTAN 150 MG: 75 TABLET ORAL at 09:01

## 2018-01-20 RX ADMIN — MORPHINE SULFATE 2 MG: 4 INJECTION, SOLUTION INTRAMUSCULAR; INTRAVENOUS at 09:01

## 2018-01-20 RX ADMIN — FLECAINIDE ACETATE 50 MG: 50 TABLET ORAL at 08:01

## 2018-01-20 RX ADMIN — FLECAINIDE ACETATE 50 MG: 50 TABLET ORAL at 09:01

## 2018-01-20 RX ADMIN — ESTRADIOL 0.5 MG: 0.5 TABLET ORAL at 09:01

## 2018-01-20 RX ADMIN — HYDRALAZINE HYDROCHLORIDE 10 MG: 10 TABLET, FILM COATED ORAL at 05:01

## 2018-01-20 RX ADMIN — ERYTHROMYCIN ETHYLSUCCINATE 200 MG: 200 GRANULE, FOR SUSPENSION ORAL at 05:01

## 2018-01-20 RX ADMIN — HYDROCODONE BITARTRATE AND ACETAMINOPHEN 30 ML: 7.5; 325 SOLUTION ORAL at 03:01

## 2018-01-20 RX ADMIN — ERYTHROMYCIN ETHYLSUCCINATE 200 MG: 200 GRANULE, FOR SUSPENSION ORAL at 12:01

## 2018-01-20 RX ADMIN — IRBESARTAN 150 MG: 75 TABLET ORAL at 08:01

## 2018-01-20 RX ADMIN — METOCLOPRAMIDE HYDROCHLORIDE 10 MG: 5 SOLUTION ORAL at 05:01

## 2018-01-20 RX ADMIN — MINERAL OIL AND WHITE PETROLATUM: 150; 830 OINTMENT OPHTHALMIC at 08:01

## 2018-01-20 RX ADMIN — ALPRAZOLAM 0.5 MG: 0.5 TABLET ORAL at 06:01

## 2018-01-20 RX ADMIN — HYDRALAZINE HYDROCHLORIDE 10 MG: 10 TABLET, FILM COATED ORAL at 10:01

## 2018-01-20 RX ADMIN — HYDROCODONE BITARTRATE AND ACETAMINOPHEN 30 ML: 7.5; 325 SOLUTION ORAL at 04:01

## 2018-01-20 NOTE — HPI
Vonnie Lees is a 72 y.o. 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 Patient initially presented in late 2017 for mild RUQ pain and nausea. She also has noticed scleral icterus. She had elevated bilirubin and mildly elevated LFTs and was scheduled for abdominal US on 11/21. She was found to have intrahepatic biliary dilation and CBD dilatation. Since she has had MRI/MRCP on 12/2 revealing intra and extrahepatic biliary dilation to the level of the sphincter of oddi with no obvious masses. She underwent EUS/ERCP on 12/14 which showed dilated bile duct to 14mm, mass at the ampulla (17 mm x10 mm). Had stricture sphincterectomy and covered stent placed in the CBD. She has also had stent placed in duodenum. Since procedure patient reports decreased nausea. She no longer has scleral icterus and itching.   Denies fever, chills, fatigue, recent weight loss, chest pain, shortness of breath, vomiting, jaundice, change in bowel movements, diarrhea or constipation.  Has underwent cardiac clearance.   Had previous laparoscopic cholecystectomy and open hysterectomy with lower transverse incision.

## 2018-01-20 NOTE — PROGRESS NOTES
[facility]  General Surgery  Progress Note    Subjective:     History of Present Illness:  Vonnie Lees is a 72 y.o. 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 Patient initially presented in late 2017 for mild RUQ pain and nausea. She also has noticed scleral icterus. She had elevated bilirubin and mildly elevated LFTs and was scheduled for abdominal US on 11/21. She was found to have intrahepatic biliary dilation and CBD dilatation. Since she has had MRI/MRCP on 12/2 revealing intra and extrahepatic biliary dilation to the level of the sphincter of oddi with no obvious masses. She underwent EUS/ERCP on 12/14 which showed dilated bile duct to 14mm, mass at the ampulla (17 mm x10 mm). Had stricture sphincterectomy and covered stent placed in the CBD. She has also had stent placed in duodenum. Since procedure patient reports decreased nausea. She no longer has scleral icterus and itching.   Denies fever, chills, fatigue, recent weight loss, chest pain, shortness of breath, vomiting, jaundice, change in bowel movements, diarrhea or constipation.  Has underwent cardiac clearance.   Had previous laparoscopic cholecystectomy and open hysterectomy with lower transverse incision.     Post-Op Info:  Procedure(s) (LRB):  WHIPPLE (N/A)  EXPLORATION-COMMON BILE DUCT (N/A)  PLACEMENT-TUBE-JEJUNOSTOMY (N/A)   8 Days Post-Op     Interval History: POD #8  whipple, common bile duct exploration and j-tube placement.   Tolerating tube feeds at 60mL/hr with some abdominal pain, no n/v. 2 BM overnight, c.diff panel yesterday was negative. Right abdominal drain with serosanguinous output, 15mL overnight.     Medications:  Continuous Infusions:    Scheduled Meds:   bisacodyl  10 mg Rectal Daily    enoxaparin  40 mg Subcutaneous Daily    erythromycin ethylsuccinate  200 mg  Per J Tube Q6H    estradiol  0.5 mg Oral Daily    famotidine (PF)  20 mg Intravenous Daily    flecainide  50 mg Oral BID    hydrALAZINE  10 mg Oral TID    irbesartan  150 mg Oral BID    metoclopramide HCl  10 mg Per J Tube Q6H    white petrolatum-mineral oil   Both Eyes QHS     PRN Meds:ALPRAZolam, calcium carbonate, diphenhydrAMINE, hydrALAZINE, hydrocodone-apap 7.5-325 MG/15 ML, hydrocodone-apap 7.5-325 MG/15 ML, labetalol, ondansetron     Review of patient's allergies indicates:   Allergen Reactions    Aldoril d30 [methyldopa-hydrochlorothiazide]      Itching eyeball    Flagyl [metronidazole hcl] Other (See Comments)     Yeast infection.    Hydrochlorothiazide      Itching eyeball.    Keflex [cephalexin] Diarrhea    Zithromax [azithromycin] Diarrhea     Objective:     Vital Signs (Most Recent):  Temp: 97.5 °F (36.4 °C) (01/20/18 0506)  Pulse: 68 (01/20/18 0506)  Resp: 18 (01/20/18 0506)  BP: (!) 124/59 (01/20/18 0506)  SpO2: 97 % (01/20/18 0506) Vital Signs (24h Range):  Temp:  [97 °F (36.1 °C)-99.1 °F (37.3 °C)] 97.5 °F (36.4 °C)  Pulse:  [66-74] 68  Resp:  [14-18] 18  SpO2:  [94 %-97 %] 97 %  BP: (110-139)/(49-68) 124/59     Weight: 73.5 kg (161 lb 15.9 oz)  Body mass index is 28.7 kg/m².    Intake/Output - Last 3 Shifts       01/18 0700 - 01/19 0659 01/19 0700 - 01/20 0659    P.O. 2055 560    I.V. (mL/kg) 0 (0)     NG/ 580    IV Piggyback 300     Total Intake(mL/kg) 2595 (35.3) 1140 (15.5)    Urine (mL/kg/hr) 250 (0.1) 500 (0.3)    Emesis/NG output 250 (0.1) 0 (0)    Drains 115 (0.1) 45 (0)    Other  0 (0)    Stool 0 (0) 0 (0)    Blood 0 (0) 0 (0)    Total Output 615 545    Net +1980 +595          Urine Occurrence 8 x 1 x    Stool Occurrence 7 x 4 x    Emesis Occurrence 1 x 0 x        Physical Exam   Constitutional: She is oriented to person, place, and time. She appears well-developed and well-nourished. No distress.   HENT:   Head: Normocephalic and atraumatic.   Eyes: EOM are normal.  Pupils are equal, round, and reactive to light.   Neck: Normal range of motion. Neck supple.   Cardiovascular: Normal rate, regular rhythm and intact distal pulses.    Pulmonary/Chest: Effort normal. No respiratory distress.   Abdominal: Soft. She exhibits no distension. There is tenderness (at incision site).   Midline incision c/d/i. Jtube in place with serous oozing from around site. JANET in place 15 cc ss fluid .    Musculoskeletal: Normal range of motion.   Neurological: She is alert and oriented to person, place, and time.   Skin: Skin is warm and dry. She is not diaphoretic.     Significant Labs:  CBC:     Recent Labs  Lab 01/18/18  0409   WBC 9.01   RBC 2.48*   HGB 7.3*   HCT 22.2*      MCV 90   MCH 29.4   MCHC 32.9     BMP:     Recent Labs  Lab 01/18/18  0409   *      K 3.2*      CO2 20*   BUN 13   CREATININE 0.8   CALCIUM 7.8*   MG 2.0     CMP:     Recent Labs  Lab 01/18/18  0409   *   CALCIUM 7.8*   ALBUMIN 2.2*   PROT 4.7*      K 3.2*   CO2 20*      BUN 13   CREATININE 0.8   ALKPHOS 103   ALT 19   AST 16   BILITOT 0.3     LFTs:     Recent Labs  Lab 01/18/18  0409   ALT 19   AST 16   ALKPHOS 103   BILITOT 0.3   PROT 4.7*   ALBUMIN 2.2*     Coagulation: No results for input(s): LABPROT, INR, APTT in the last 168 hours.      Assessment/Plan:     * Ampullary carcinoma    71 yo male s/p whipple, common bile duct exploration and j-tube placement.     -QOD labs   -replete lytes as needed  -pulm toilet- IS, duonebs   -PT/OT. Patient needs to be out of bed in chair majority of day. She needs to be walking halls multiple times a day. Will require motivation and encourage from nursing/PT.  -Strict I/Os   -continue erythromycin through J  - continue tube feeds tonight at 60cc from 8pto8a, dilute to half strength  -Hycet through Jtube, added PRN IV morphine for breakthrough pain  -dvt ppx    -Cdiff tested for multiple loose bm and was negative            Riri Ellis  MD  General Surgery  [facility]

## 2018-01-20 NOTE — ASSESSMENT & PLAN NOTE
71 yo male s/p whipple, common bile duct exploration and j-tube placement.     -QOD labs   -replete lytes as needed  -pulm toilet- IS, duonebs   -PT/OT. Patient needs to be out of bed in chair majority of day. She needs to be walking halls multiple times a day. Will require motivation and encourage from nursing/PT.  -Strict I/Os   -continue erythromycin through J  - continue tube feeds tonight at 60cc from 8pto8a, dilute to half strength  -Hycet through Jtube, added PRN IV morphine for breakthrough pain  -dvt ppx    -Cdiff tested for multiple loose bm and was negative

## 2018-01-20 NOTE — PLAN OF CARE
"Problem: Patient Care Overview  Goal: Plan of Care Review  Outcome: Ongoing (interventions implemented as appropriate)  Plan of care reviewed with patient, a 72 year old female with the DX of Ampula carcinoma ,,,, had a whipple on the 12th of this month, Jtube left side lower abdomen, JANET drain right side,,,Midline EDILBERTO ,,, iso source at 60 ml's and hour ,, advance by 10 ml"s a night till target of 70 is hit,,,,has had 2 BM's today, up in room  Room air, pain is still constant with moderate relief from PRN meds,,,, alert and oriented, no falls on tonights shift,, she has been complaining of tube feedings making her nauseated  But tonight she has tolerated well,,,, bed locked and low call light  In reach,,,,,,,      "

## 2018-01-20 NOTE — SUBJECTIVE & OBJECTIVE
Interval History: POD #8  whipple, common bile duct exploration and j-tube placement.   Tolerating tube feeds at 60mL/hr with some abdominal pain, no n/v. 2 BM overnight, c.diff panel yesterday was negative. Right abdominal drain with serosanguinous output, 15mL overnight.     Medications:  Continuous Infusions:    Scheduled Meds:   bisacodyl  10 mg Rectal Daily    enoxaparin  40 mg Subcutaneous Daily    erythromycin ethylsuccinate  200 mg Per J Tube Q6H    estradiol  0.5 mg Oral Daily    famotidine (PF)  20 mg Intravenous Daily    flecainide  50 mg Oral BID    hydrALAZINE  10 mg Oral TID    irbesartan  150 mg Oral BID    metoclopramide HCl  10 mg Per J Tube Q6H    white petrolatum-mineral oil   Both Eyes QHS     PRN Meds:ALPRAZolam, calcium carbonate, diphenhydrAMINE, hydrALAZINE, hydrocodone-apap 7.5-325 MG/15 ML, hydrocodone-apap 7.5-325 MG/15 ML, labetalol, ondansetron     Review of patient's allergies indicates:   Allergen Reactions    Aldoril d30 [methyldopa-hydrochlorothiazide]      Itching eyeball    Flagyl [metronidazole hcl] Other (See Comments)     Yeast infection.    Hydrochlorothiazide      Itching eyeball.    Keflex [cephalexin] Diarrhea    Zithromax [azithromycin] Diarrhea     Objective:     Vital Signs (Most Recent):  Temp: 97.5 °F (36.4 °C) (01/20/18 0506)  Pulse: 68 (01/20/18 0506)  Resp: 18 (01/20/18 0506)  BP: (!) 124/59 (01/20/18 0506)  SpO2: 97 % (01/20/18 0506) Vital Signs (24h Range):  Temp:  [97 °F (36.1 °C)-99.1 °F (37.3 °C)] 97.5 °F (36.4 °C)  Pulse:  [66-74] 68  Resp:  [14-18] 18  SpO2:  [94 %-97 %] 97 %  BP: (110-139)/(49-68) 124/59     Weight: 73.5 kg (161 lb 15.9 oz)  Body mass index is 28.7 kg/m².    Intake/Output - Last 3 Shifts       01/18 0700 - 01/19 0659 01/19 0700 - 01/20 0659    P.O. 2055 560    I.V. (mL/kg) 0 (0)     NG/ 580    IV Piggyback 300     Total Intake(mL/kg) 2595 (35.3) 1140 (15.5)    Urine (mL/kg/hr) 250 (0.1) 500 (0.3)    Emesis/NG output 250  (0.1) 0 (0)    Drains 115 (0.1) 45 (0)    Other  0 (0)    Stool 0 (0) 0 (0)    Blood 0 (0) 0 (0)    Total Output 615 545    Net +1980 +595          Urine Occurrence 8 x 1 x    Stool Occurrence 7 x 4 x    Emesis Occurrence 1 x 0 x        Physical Exam   Constitutional: She is oriented to person, place, and time. She appears well-developed and well-nourished. No distress.   HENT:   Head: Normocephalic and atraumatic.   Eyes: EOM are normal. Pupils are equal, round, and reactive to light.   Neck: Normal range of motion. Neck supple.   Cardiovascular: Normal rate, regular rhythm and intact distal pulses.    Pulmonary/Chest: Effort normal. No respiratory distress.   Abdominal: Soft. She exhibits no distension. There is tenderness (at incision site).   Midline incision c/d/i. Jtube in place with serous oozing from around site. JANET in place 15 cc ss fluid .    Musculoskeletal: Normal range of motion.   Neurological: She is alert and oriented to person, place, and time.   Skin: Skin is warm and dry. She is not diaphoretic.     Significant Labs:  CBC:     Recent Labs  Lab 01/18/18  0409   WBC 9.01   RBC 2.48*   HGB 7.3*   HCT 22.2*      MCV 90   MCH 29.4   MCHC 32.9     BMP:     Recent Labs  Lab 01/18/18  0409   *      K 3.2*      CO2 20*   BUN 13   CREATININE 0.8   CALCIUM 7.8*   MG 2.0     CMP:     Recent Labs  Lab 01/18/18  0409   *   CALCIUM 7.8*   ALBUMIN 2.2*   PROT 4.7*      K 3.2*   CO2 20*      BUN 13   CREATININE 0.8   ALKPHOS 103   ALT 19   AST 16   BILITOT 0.3     LFTs:     Recent Labs  Lab 01/18/18  0409   ALT 19   AST 16   ALKPHOS 103   BILITOT 0.3   PROT 4.7*   ALBUMIN 2.2*     Coagulation: No results for input(s): LABPROT, INR, APTT in the last 168 hours.

## 2018-01-20 NOTE — PLAN OF CARE
Problem: Patient Care Overview  Goal: Plan of Care Review  Outcome: Ongoing (interventions implemented as appropriate)  Plan of care discussed with pt. Pt verbalizes understanding. Pt tolerating NPO with no complaints of nausea. Pain managed with PRN pain medications. Pt ambulated in lowery 2x shift and sat up in chair.  Pt positions independently. VS stable. Pt remains free of falls and injury. Pt has 2 loose BM's today. Will continue to monitor.

## 2018-01-20 NOTE — PT/OT/SLP PROGRESS
Physical Therapy      Patient Name:  Vonnie Lees   MRN:  [id number]    Patient not seen today secondary to increased pain and awaiting pain meds x 2 attempts. Will follow-up with pt on next visit according to POC.     MARKO Cai  I certify that I was present in the room directing the student in service delivery and guiding them using my skilled judgment. As the co-signing therapist I have reviewed the students documentation and am responsible for the treatment, assessment, and plan. Wilian Ugalde PTA 1/20/2018

## 2018-01-21 PROCEDURE — S0028 INJECTION, FAMOTIDINE, 20 MG: HCPCS | Performed by: NURSE PRACTITIONER

## 2018-01-21 PROCEDURE — 63600175 PHARM REV CODE 636 W HCPCS: Performed by: SURGERY

## 2018-01-21 PROCEDURE — 25000003 PHARM REV CODE 250: Performed by: SURGERY

## 2018-01-21 PROCEDURE — 97110 THERAPEUTIC EXERCISES: CPT

## 2018-01-21 PROCEDURE — 25000003 PHARM REV CODE 250: Performed by: NURSE PRACTITIONER

## 2018-01-21 PROCEDURE — 20600001 HC STEP DOWN PRIVATE ROOM

## 2018-01-21 PROCEDURE — 63600175 PHARM REV CODE 636 W HCPCS: Performed by: STUDENT IN AN ORGANIZED HEALTH CARE EDUCATION/TRAINING PROGRAM

## 2018-01-21 PROCEDURE — 25000003 PHARM REV CODE 250: Performed by: STUDENT IN AN ORGANIZED HEALTH CARE EDUCATION/TRAINING PROGRAM

## 2018-01-21 PROCEDURE — 97116 GAIT TRAINING THERAPY: CPT

## 2018-01-21 RX ORDER — FUROSEMIDE 10 MG/ML
40 INJECTION INTRAMUSCULAR; INTRAVENOUS ONCE
Status: COMPLETED | OUTPATIENT
Start: 2018-01-21 | End: 2018-01-21

## 2018-01-21 RX ORDER — LIDOCAINE HYDROCHLORIDE 10 MG/ML
10 INJECTION INFILTRATION; PERINEURAL ONCE
Status: COMPLETED | OUTPATIENT
Start: 2018-01-21 | End: 2018-01-21

## 2018-01-21 RX ADMIN — ERYTHROMYCIN ETHYLSUCCINATE 200 MG: 200 GRANULE, FOR SUSPENSION ORAL at 12:01

## 2018-01-21 RX ADMIN — METOCLOPRAMIDE HYDROCHLORIDE 10 MG: 5 SOLUTION ORAL at 05:01

## 2018-01-21 RX ADMIN — MORPHINE SULFATE 2 MG: 4 INJECTION, SOLUTION INTRAMUSCULAR; INTRAVENOUS at 07:01

## 2018-01-21 RX ADMIN — ERYTHROMYCIN ETHYLSUCCINATE 200 MG: 200 GRANULE, FOR SUSPENSION ORAL at 05:01

## 2018-01-21 RX ADMIN — HYDROCODONE BITARTRATE AND ACETAMINOPHEN 30 ML: 7.5; 325 SOLUTION ORAL at 08:01

## 2018-01-21 RX ADMIN — FUROSEMIDE 40 MG: 10 INJECTION, SOLUTION INTRAMUSCULAR; INTRAVENOUS at 12:01

## 2018-01-21 RX ADMIN — METOCLOPRAMIDE HYDROCHLORIDE 10 MG: 5 SOLUTION ORAL at 11:01

## 2018-01-21 RX ADMIN — HYDRALAZINE HYDROCHLORIDE 10 MG: 10 TABLET, FILM COATED ORAL at 05:01

## 2018-01-21 RX ADMIN — FAMOTIDINE 20 MG: 10 INJECTION, SOLUTION INTRAVENOUS at 08:01

## 2018-01-21 RX ADMIN — HYDROCODONE BITARTRATE AND ACETAMINOPHEN 15 ML: 7.5; 325 SOLUTION ORAL at 03:01

## 2018-01-21 RX ADMIN — HYDRALAZINE HYDROCHLORIDE 10 MG: 10 TABLET, FILM COATED ORAL at 01:01

## 2018-01-21 RX ADMIN — LIDOCAINE HYDROCHLORIDE 10 ML: 10 INJECTION, SOLUTION INFILTRATION; PERINEURAL at 03:01

## 2018-01-21 RX ADMIN — IRBESARTAN 150 MG: 75 TABLET ORAL at 08:01

## 2018-01-21 RX ADMIN — ESTRADIOL 0.5 MG: 0.5 TABLET ORAL at 08:01

## 2018-01-21 RX ADMIN — HYDROCODONE BITARTRATE AND ACETAMINOPHEN 30 ML: 7.5; 325 SOLUTION ORAL at 09:01

## 2018-01-21 RX ADMIN — HYDRALAZINE HYDROCHLORIDE 10 MG: 10 TABLET, FILM COATED ORAL at 09:01

## 2018-01-21 RX ADMIN — METOCLOPRAMIDE HYDROCHLORIDE 10 MG: 5 SOLUTION ORAL at 12:01

## 2018-01-21 RX ADMIN — ENOXAPARIN SODIUM 40 MG: 100 INJECTION SUBCUTANEOUS at 04:01

## 2018-01-21 RX ADMIN — MINERAL OIL AND WHITE PETROLATUM: 150; 830 OINTMENT OPHTHALMIC at 08:01

## 2018-01-21 RX ADMIN — ALPRAZOLAM 0.5 MG: 0.5 TABLET ORAL at 12:01

## 2018-01-21 RX ADMIN — HYDROCODONE BITARTRATE AND ACETAMINOPHEN 30 ML: 7.5; 325 SOLUTION ORAL at 12:01

## 2018-01-21 RX ADMIN — FLECAINIDE ACETATE 50 MG: 50 TABLET ORAL at 08:01

## 2018-01-21 RX ADMIN — ERYTHROMYCIN ETHYLSUCCINATE 200 MG: 200 GRANULE, FOR SUSPENSION ORAL at 11:01

## 2018-01-21 NOTE — PLAN OF CARE
Problem: Patient Care Overview  Goal: Plan of Care Review  Outcome: Ongoing (interventions implemented as appropriate)  Plan of care reviewed with patient, a 72 year old female with the DX of Ampula cancer,,, Has a whipple on the 12th of this month,, Midline incision EDILBERTO with a peg tube with Isosource running 8 to 8 at 70,,,,Right JANET drain also, alert and oriented times four,,, Peg tube seems to be leaking drainage more tonight than before, On call Physician notified, said he would look at in the morning,,, up ad libe with assistance ini lowery and in room,, Pain controlled with PRN meds, no falls this shift, all VS WNL, bed locked and low, call light in reach,,,,,,,

## 2018-01-21 NOTE — PLAN OF CARE
Problem: Physical Therapy Goal  Goal: Physical Therapy Goal  Goals to be met by: 2018     Patient will increase functional independence with mobility by performin. Supine to sit with Modified [place]-not met  2. Rolling to Right with Modified [place]. - not met  3. Sit to stand transfer with Supervision- met  4. Gait  x 50 feet with Supervision- met  5. Pt able to perform seated therex x 20 reps BLE with supervision. - not met       Pt will continue progressing with skilled PT to reach goals.   MARKO Cai   2018  I certify that I was present in the room directing the student in service delivery and guiding them using my skilled judgment. As the co-signing therapist I have reviewed the students documentation and am responsible for the treatment, assessment, and plan. Wilian Ugalde PTA  2018

## 2018-01-21 NOTE — PT/OT/SLP PROGRESS
Physical Therapy Treatment    Patient Name:  Vonnie Lees   MRN:  [id number]    Recommendations:     Discharge Recommendations:  home with home health   Discharge Equipment Recommendations: walker, rolling   Barriers to discharge: None    Assessment:     Vonnie Lees is a 72 y.o. female admitted with a medical diagnosis of Ampullary carcinoma.  She presents with the following impairments/functional limitations:  weakness, pain, impaired functional mobilty, gait instability. Pt complained of pain prior and during tx. Pt increased gait distance with less assistance from PT improving overall functional mobility skills. Pt is safe to amb with nursing or family in hallway with assistance with Rw. Pt would continue to progress with PT to return to prior level of function.     Rehab Prognosis:  Good; patient would benefit from acute skilled PT services to address these deficits and reach maximum level of function.      Recent Surgery: Procedure(s) (LRB):  WHIPPLE (N/A)  EXPLORATION-COMMON BILE DUCT (N/A)  PLACEMENT-TUBE-JEJUNOSTOMY (N/A) 9 Days Post-Op    Plan:     During this hospitalization, patient to be seen 4 x/week to address the above listed problems via gait training, therapeutic activities, therapeutic exercises  · Plan of Care Expires:  02/13/18   Plan of Care Reviewed with: spouse, patient    Subjective     Communicated with RN prior to session.  Patient found supine upon PT entry to room, agreeable to treatment.      Chief Complaint: Pain     · Pain Rating 1:  (pt did not rate)  · Location - Side 1: Bilateral  · Location - Orientation 1: generalized  · Pain Addressed 1: Pre-medicate for activity, Distraction  · Pain Rating Post-Intervention 2:  (pt did not rate)    Patients cultural, spiritual, Evangelical conflicts given the current situation: none noted    Objective:     Patient found with:   J tube    General Precautions: Standard, fall   Orthopedic Precautions:N/A   Braces: N/A     Functional  Mobility:  · Bed Mobility:     · Rolling Left:  modified independence   · Scooting: modified independence   · Supine to Sit: modified independence   · Transfers:     · Sit to Stand:  stand by assistance with rolling walker  · Gait: 400' with S and RW, no LOB. Pt was instructed to stand erect and look around to increase awareness of surroundings. Pt amb with a normal speed.       AM-PAC 6 CLICK MOBILITY  Turning over in bed (including adjusting bedclothes, sheets and blankets)?: 3  Sitting down on and standing up from a chair with arms (e.g., wheelchair, bedside commode, etc.): 3  Moving from lying on back to sitting on the side of the bed?: 4  Moving to and from a bed to a chair (including a wheelchair)?: 3  Need to walk in hospital room?: 3  Climbing 3-5 steps with a railing?: 3  Total Score: 19       Therapeutic Activities and Exercises:  Pt states to be compliant with TherEx and has performed Independent ankle pumps and LAQ daily x 15 reps ea.   Pt was instructed on hip ADD x 15 reps   Will continue increasing reps as seen fit  White board updated       Patient left up in chair with call button in reach..    GOALS:    Physical Therapy Goals        Problem: Physical Therapy Goal    Goal Priority Disciplines Outcome Goal Variances Interventions   Physical Therapy Goal     PT/OT, PT Ongoing (interventions implemented as appropriate)     Description:  Goals to be met by: 2018     Patient will increase functional independence with mobility by performin. Supine to sit with Modified [place]-not met  2. Rolling to Right with Modified [place]. - not met  3. Sit to stand transfer with Supervision- met  4. Gait  x 50 feet with Supervision- met  5. Pt able to perform seated therex x 20 reps BLE with supervision. - not met                         Time Tracking:     PT Received On: 18  PT Start Time: 1035     PT Stop Time: 1058  PT Total Time (min): 23 min     Billable Minutes: Gait Training 14 and  Therapeutic Exercise 9    Treatment Type: Treatment  PT/PTA: PTA     PTA Visit Number: 1     Herminia Eduardo SPTA  01/21/2018   I certify that I was present in the room directing the student in service delivery and guiding them using my skilled judgment. As the co-signing therapist I have reviewed the students documentation and am responsible for the treatment, assessment, and plan. Wilian Ugalde PTA    1/21/2018

## 2018-01-21 NOTE — PLAN OF CARE
Problem: Patient Care Overview  Goal: Plan of Care Review  Plan of care reviewed with patient and pt. Family. AAOX4, vitals stable. ML incision open to air, intact. Jtube intact, guaze dressing changed twice during shift, drainage occurring to jtube site. Right JANET drain to abdomen intact. Npo diet maintained. Loose bowel movements occurring during shift. Pt. Up adlib, ambulated in halls. Pain managed with prn meds.

## 2018-01-21 NOTE — SUBJECTIVE & OBJECTIVE
Interval History: POD #9  whipple, common bile duct exploration and j-tube placement.     Tolerating tube feeds half strength at 60mL/hr with some abdominal pain, no n/v. 3 BM yesterday. Right abdominal drain with serosanguinous output, 15mL overnight. Draining a round G tube this morning- gauze saturated and drips when she is up and walking. Patient states she is up and ambulating. Voiding, UOP adequate.    Medications:  Continuous Infusions:    Scheduled Meds:   bisacodyl  10 mg Rectal Daily    enoxaparin  40 mg Subcutaneous Daily    erythromycin ethylsuccinate  200 mg Per J Tube Q6H    estradiol  0.5 mg Oral Daily    famotidine (PF)  20 mg Intravenous Daily    flecainide  50 mg Oral BID    hydrALAZINE  10 mg Oral TID    irbesartan  150 mg Oral BID    metoclopramide HCl  10 mg Per J Tube Q6H    white petrolatum-mineral oil   Both Eyes QHS     PRN Meds:ALPRAZolam, calcium carbonate, diphenhydrAMINE, hydrALAZINE, hydrocodone-apap 7.5-325 MG/15 ML, hydrocodone-apap 7.5-325 MG/15 ML, labetalol, morphine, ondansetron     Review of patient's allergies indicates:   Allergen Reactions    Aldoril d30 [methyldopa-hydrochlorothiazide]      Itching eyeball    Flagyl [metronidazole hcl] Other (See Comments)     Yeast infection.    Hydrochlorothiazide      Itching eyeball.    Keflex [cephalexin] Diarrhea    Zithromax [azithromycin] Diarrhea     Objective:     Vital Signs (Most Recent):  Temp: 97.8 °F (36.6 °C) (01/21/18 0500)  Pulse: 69 (01/21/18 0500)  Resp: 18 (01/21/18 0500)  BP: 135/72 (01/21/18 0500)  SpO2: 95 % (01/21/18 0500) Vital Signs (24h Range):  Temp:  [97.7 °F (36.5 °C)-99.3 °F (37.4 °C)] 97.8 °F (36.6 °C)  Pulse:  [68-80] 69  Resp:  [14-18] 18  SpO2:  [93 %-98 %] 95 %  BP: (129-162)/(60-78) 135/72     Weight: 73.5 kg (161 lb 15.9 oz)  Body mass index is 28.7 kg/m².    Intake/Output - Last 3 Shifts       01/19 0700 - 01/20 0659 01/20 0700 - 01/21 0659 01/21 0700 - 01/22 0659    P.O. 560 100     I.V.  (mL/kg)       NG/ 720     IV Piggyback       Total Intake(mL/kg) 1140 (15.5) 820 (11.2)     Urine (mL/kg/hr) 500 (0.3) 850 (0.5)     Emesis/NG output 0 (0) 0 (0)     Drains 45 (0) 45 (0)     Other 0 (0) 0 (0)     Stool 0 (0) 0 (0)     Blood 0 (0) 0 (0)     Total Output 545 895      Net +595 -75             Urine Occurrence 1 x 2 x     Stool Occurrence 4 x 3 x     Emesis Occurrence 0 x 0 x         Physical Exam   Constitutional: She is oriented to person, place, and time. She appears well-developed and well-nourished. No distress.   HENT:   Head: Normocephalic and atraumatic.   Eyes: EOM are normal. Pupils are equal, round, and reactive to light.   Neck: Normal range of motion. Neck supple.   Cardiovascular: Normal rate, regular rhythm and intact distal pulses.    Pulmonary/Chest: Effort normal. No respiratory distress.   Abdominal: Soft. She exhibits no distension. There is tenderness (at incision site).   Midline incision c/d/i. Jtube in place with serous oozing from around site- gauze saturated this morning with serous output. JANET in place 15 cc ss fluid .    Musculoskeletal: Normal range of motion.   Neurological: She is alert and oriented to person, place, and time.   Skin: Skin is warm and dry. She is not diaphoretic.     Significant Labs:  CBC:     Recent Labs  Lab 01/20/18 0721   WBC 10.71   RBC 2.61*   HGB 7.6*   HCT 23.8*      MCV 91   MCH 29.1   MCHC 31.9*     BMP:     Recent Labs  Lab 01/20/18  0721   *      K 3.8      CO2 22*   BUN 11   CREATININE 0.7   CALCIUM 8.2*   MG 1.7     CMP:     Recent Labs  Lab 01/20/18  0721   *   CALCIUM 8.2*   ALBUMIN 2.1*   PROT 5.0*      K 3.8   CO2 22*      BUN 11   CREATININE 0.7   ALKPHOS 102   ALT 18   AST 13   BILITOT 0.2     LFTs:     Recent Labs  Lab 01/20/18  0721   ALT 18   AST 13   ALKPHOS 102   BILITOT 0.2   PROT 5.0*   ALBUMIN 2.1*     Coagulation: No results for input(s): LABPROT, INR, APTT in the last 168  hours.

## 2018-01-21 NOTE — PROGRESS NOTES
[facility]  General Surgery  Progress Note    Subjective:     History of Present Illness:  Vonnie Lees is a 72 y.o. 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 Patient initially presented in late 2017 for mild RUQ pain and nausea. She also has noticed scleral icterus. She had elevated bilirubin and mildly elevated LFTs and was scheduled for abdominal US on 11/21. She was found to have intrahepatic biliary dilation and CBD dilatation. Since she has had MRI/MRCP on 12/2 revealing intra and extrahepatic biliary dilation to the level of the sphincter of oddi with no obvious masses. She underwent EUS/ERCP on 12/14 which showed dilated bile duct to 14mm, mass at the ampulla (17 mm x10 mm). Had stricture sphincterectomy and covered stent placed in the CBD. She has also had stent placed in duodenum. Since procedure patient reports decreased nausea. She no longer has scleral icterus and itching.   Denies fever, chills, fatigue, recent weight loss, chest pain, shortness of breath, vomiting, jaundice, change in bowel movements, diarrhea or constipation.  Has underwent cardiac clearance.   Had previous laparoscopic cholecystectomy and open hysterectomy with lower transverse incision.     Post-Op Info:  Procedure(s) (LRB):  WHIPPLE (N/A)  EXPLORATION-COMMON BILE DUCT (N/A)  PLACEMENT-TUBE-JEJUNOSTOMY (N/A)   9 Days Post-Op     Interval History: POD #9  whipple, common bile duct exploration and j-tube placement.     Tolerating tube feeds half strength at 60mL/hr with some abdominal pain, no n/v. 3 BM yesterday. Right abdominal drain with serosanguinous output, 15mL overnight. Draining a round G tube this morning- gauze saturated and drips when she is up and walking. Patient states she is up and ambulating. Voiding, UOP adequate.    Medications:  Continuous  Infusions:    Scheduled Meds:   bisacodyl  10 mg Rectal Daily    enoxaparin  40 mg Subcutaneous Daily    erythromycin ethylsuccinate  200 mg Per J Tube Q6H    estradiol  0.5 mg Oral Daily    famotidine (PF)  20 mg Intravenous Daily    flecainide  50 mg Oral BID    hydrALAZINE  10 mg Oral TID    irbesartan  150 mg Oral BID    metoclopramide HCl  10 mg Per J Tube Q6H    white petrolatum-mineral oil   Both Eyes QHS     PRN Meds:ALPRAZolam, calcium carbonate, diphenhydrAMINE, hydrALAZINE, hydrocodone-apap 7.5-325 MG/15 ML, hydrocodone-apap 7.5-325 MG/15 ML, labetalol, morphine, ondansetron     Review of patient's allergies indicates:   Allergen Reactions    Aldoril d30 [methyldopa-hydrochlorothiazide]      Itching eyeball    Flagyl [metronidazole hcl] Other (See Comments)     Yeast infection.    Hydrochlorothiazide      Itching eyeball.    Keflex [cephalexin] Diarrhea    Zithromax [azithromycin] Diarrhea     Objective:     Vital Signs (Most Recent):  Temp: 97.8 °F (36.6 °C) (01/21/18 0500)  Pulse: 69 (01/21/18 0500)  Resp: 18 (01/21/18 0500)  BP: 135/72 (01/21/18 0500)  SpO2: 95 % (01/21/18 0500) Vital Signs (24h Range):  Temp:  [97.7 °F (36.5 °C)-99.3 °F (37.4 °C)] 97.8 °F (36.6 °C)  Pulse:  [68-80] 69  Resp:  [14-18] 18  SpO2:  [93 %-98 %] 95 %  BP: (129-162)/(60-78) 135/72     Weight: 73.5 kg (161 lb 15.9 oz)  Body mass index is 28.7 kg/m².    Intake/Output - Last 3 Shifts       01/19 0700 - 01/20 0659 01/20 0700 - 01/21 0659 01/21 0700 - 01/22 0659    P.O. 560 100     I.V. (mL/kg)       NG/ 720     IV Piggyback       Total Intake(mL/kg) 1140 (15.5) 820 (11.2)     Urine (mL/kg/hr) 500 (0.3) 850 (0.5)     Emesis/NG output 0 (0) 0 (0)     Drains 45 (0) 45 (0)     Other 0 (0) 0 (0)     Stool 0 (0) 0 (0)     Blood 0 (0) 0 (0)     Total Output 545 895      Net +595 -75             Urine Occurrence 1 x 2 x     Stool Occurrence 4 x 3 x     Emesis Occurrence 0 x 0 x         Physical Exam    Constitutional: She is oriented to person, place, and time. She appears well-developed and well-nourished. No distress.   HENT:   Head: Normocephalic and atraumatic.   Eyes: EOM are normal. Pupils are equal, round, and reactive to light.   Neck: Normal range of motion. Neck supple.   Cardiovascular: Normal rate, regular rhythm and intact distal pulses.    Pulmonary/Chest: Effort normal. No respiratory distress.   Abdominal: Soft. She exhibits no distension. There is tenderness (at incision site).   Midline incision c/d/i. Jtube in place with serous oozing from around site- gauze saturated this morning with serous output. JANET in place 15 cc ss fluid .    Musculoskeletal: Normal range of motion.   Neurological: She is alert and oriented to person, place, and time.   Skin: Skin is warm and dry. She is not diaphoretic.     Significant Labs:  CBC:     Recent Labs  Lab 01/20/18  0721   WBC 10.71   RBC 2.61*   HGB 7.6*   HCT 23.8*      MCV 91   MCH 29.1   MCHC 31.9*     BMP:     Recent Labs  Lab 01/20/18  0721   *      K 3.8      CO2 22*   BUN 11   CREATININE 0.7   CALCIUM 8.2*   MG 1.7     CMP:     Recent Labs  Lab 01/20/18  0721   *   CALCIUM 8.2*   ALBUMIN 2.1*   PROT 5.0*      K 3.8   CO2 22*      BUN 11   CREATININE 0.7   ALKPHOS 102   ALT 18   AST 13   BILITOT 0.2     LFTs:     Recent Labs  Lab 01/20/18  0721   ALT 18   AST 13   ALKPHOS 102   BILITOT 0.2   PROT 5.0*   ALBUMIN 2.1*     Coagulation: No results for input(s): LABPROT, INR, APTT in the last 168 hours.      Assessment/Plan:     * Ampullary carcinoma    73 yo male s/p whipple, common bile duct exploration and j-tube placement.     -QOD labs   -replete lytes as needed  -pulm toilet- IS, duonebs   -PT/OT. Patient needs to be out of bed in chair majority of day. She needs to be walking halls multiple times a day. Will require motivation and encourage from nursing/PT.  -Strict I/Os   -continue erythromycin through  J  - continue tube feeds tonight at 60cc from 8pto8a, at half strength  - NPO, possibly advance to CLD- will discuss with staff  -Hycet through Jtube, added PRN IV morphine for breakthrough pain  -dvt ppx    -Cdiff tested for multiple loose bm and was negative  - consider placing suture in skin around J tube  - will discuss with staff            Riri Ellis MD  General Surgery  [facility]

## 2018-01-22 LAB
ALBUMIN SERPL BCP-MCNC: 2.1 G/DL
ALP SERPL-CCNC: 89 U/L
ALT SERPL W/O P-5'-P-CCNC: 15 U/L
AMYLASE, BODY FLUID: 2683 U/L
ANION GAP SERPL CALC-SCNC: 7 MMOL/L
AST SERPL-CCNC: 14 U/L
BASOPHILS # BLD AUTO: 0.03 K/UL
BASOPHILS NFR BLD: 0.3 %
BILIRUB SERPL-MCNC: 0.2 MG/DL
BODY FLUID SOURCE AMYLASE: NORMAL
BUN SERPL-MCNC: 8 MG/DL
CA-I BLDV-SCNC: 1.14 MMOL/L
CALCIUM SERPL-MCNC: 8 MG/DL
CHLORIDE SERPL-SCNC: 108 MMOL/L
CO2 SERPL-SCNC: 26 MMOL/L
CREAT SERPL-MCNC: 0.7 MG/DL
DIFFERENTIAL METHOD: ABNORMAL
EOSINOPHIL # BLD AUTO: 0.5 K/UL
EOSINOPHIL NFR BLD: 4.5 %
ERYTHROCYTE [DISTWIDTH] IN BLOOD BY AUTOMATED COUNT: 13.9 %
EST. GFR  (AFRICAN AMERICAN): >60 ML/MIN/1.73 M^2
EST. GFR  (NON AFRICAN AMERICAN): >60 ML/MIN/1.73 M^2
GLUCOSE SERPL-MCNC: 136 MG/DL
HCT VFR BLD AUTO: 22.1 %
HGB BLD-MCNC: 7.2 G/DL
IMM GRANULOCYTES # BLD AUTO: 0.15 K/UL
IMM GRANULOCYTES NFR BLD AUTO: 1.4 %
LYMPHOCYTES # BLD AUTO: 1.4 K/UL
LYMPHOCYTES NFR BLD: 13.5 %
MAGNESIUM SERPL-MCNC: 1.8 MG/DL
MCH RBC QN AUTO: 29.1 PG
MCHC RBC AUTO-ENTMCNC: 32.6 G/DL
MCV RBC AUTO: 90 FL
MONOCYTES # BLD AUTO: 0.7 K/UL
MONOCYTES NFR BLD: 6.9 %
NEUTROPHILS # BLD AUTO: 7.8 K/UL
NEUTROPHILS NFR BLD: 73.4 %
NRBC BLD-RTO: 0 /100 WBC
PHOSPHATE SERPL-MCNC: 3.4 MG/DL
PLATELET # BLD AUTO: 227 K/UL
PMV BLD AUTO: 9.9 FL
POTASSIUM SERPL-SCNC: 3 MMOL/L
PROT SERPL-MCNC: 4.8 G/DL
RBC # BLD AUTO: 2.47 M/UL
SODIUM SERPL-SCNC: 141 MMOL/L
WBC # BLD AUTO: 10.65 K/UL

## 2018-01-22 PROCEDURE — 36415 COLL VENOUS BLD VENIPUNCTURE: CPT

## 2018-01-22 PROCEDURE — S0028 INJECTION, FAMOTIDINE, 20 MG: HCPCS | Performed by: NURSE PRACTITIONER

## 2018-01-22 PROCEDURE — 63600175 PHARM REV CODE 636 W HCPCS: Performed by: NURSE PRACTITIONER

## 2018-01-22 PROCEDURE — 20600001 HC STEP DOWN PRIVATE ROOM

## 2018-01-22 PROCEDURE — 84100 ASSAY OF PHOSPHORUS: CPT

## 2018-01-22 PROCEDURE — 25000003 PHARM REV CODE 250: Performed by: SURGERY

## 2018-01-22 PROCEDURE — 25000003 PHARM REV CODE 250: Performed by: STUDENT IN AN ORGANIZED HEALTH CARE EDUCATION/TRAINING PROGRAM

## 2018-01-22 PROCEDURE — 82150 ASSAY OF AMYLASE: CPT

## 2018-01-22 PROCEDURE — 63600175 PHARM REV CODE 636 W HCPCS: Performed by: SURGERY

## 2018-01-22 PROCEDURE — 80053 COMPREHEN METABOLIC PANEL: CPT

## 2018-01-22 PROCEDURE — 25000003 PHARM REV CODE 250: Performed by: NURSE PRACTITIONER

## 2018-01-22 PROCEDURE — 82330 ASSAY OF CALCIUM: CPT

## 2018-01-22 PROCEDURE — 85025 COMPLETE CBC W/AUTO DIFF WBC: CPT

## 2018-01-22 PROCEDURE — 83735 ASSAY OF MAGNESIUM: CPT

## 2018-01-22 RX ORDER — MAGNESIUM SULFATE HEPTAHYDRATE 40 MG/ML
2 INJECTION, SOLUTION INTRAVENOUS ONCE
Status: COMPLETED | OUTPATIENT
Start: 2018-01-22 | End: 2018-01-22

## 2018-01-22 RX ORDER — ENOXAPARIN SODIUM 100 MG/ML
40 INJECTION SUBCUTANEOUS DAILY
Qty: 5.6 ML | Refills: 0 | Status: SHIPPED | OUTPATIENT
Start: 2018-01-22 | End: 2018-02-05

## 2018-01-22 RX ORDER — MAGNESIUM SULFATE HEPTAHYDRATE 40 MG/ML
2 INJECTION, SOLUTION INTRAVENOUS ONCE
Status: DISCONTINUED | OUTPATIENT
Start: 2018-01-22 | End: 2018-01-22

## 2018-01-22 RX ORDER — POTASSIUM CHLORIDE 20 MEQ/15ML
60 SOLUTION ORAL ONCE
Status: COMPLETED | OUTPATIENT
Start: 2018-01-22 | End: 2018-01-22

## 2018-01-22 RX ORDER — FUROSEMIDE 10 MG/ML
40 INJECTION INTRAMUSCULAR; INTRAVENOUS ONCE
Status: COMPLETED | OUTPATIENT
Start: 2018-01-22 | End: 2018-01-22

## 2018-01-22 RX ADMIN — ERYTHROMYCIN ETHYLSUCCINATE 200 MG: 200 GRANULE, FOR SUSPENSION ORAL at 05:01

## 2018-01-22 RX ADMIN — HYDROCODONE BITARTRATE AND ACETAMINOPHEN 30 ML: 7.5; 325 SOLUTION ORAL at 03:01

## 2018-01-22 RX ADMIN — HYDROCODONE BITARTRATE AND ACETAMINOPHEN 15 ML: 7.5; 325 SOLUTION ORAL at 10:01

## 2018-01-22 RX ADMIN — POTASSIUM CHLORIDE 60 MEQ: 20 SOLUTION ORAL at 12:01

## 2018-01-22 RX ADMIN — HYDROCODONE BITARTRATE AND ACETAMINOPHEN 30 ML: 7.5; 325 SOLUTION ORAL at 09:01

## 2018-01-22 RX ADMIN — ERYTHROMYCIN ETHYLSUCCINATE 200 MG: 200 GRANULE, FOR SUSPENSION ORAL at 12:01

## 2018-01-22 RX ADMIN — METOCLOPRAMIDE HYDROCHLORIDE 10 MG: 5 SOLUTION ORAL at 01:01

## 2018-01-22 RX ADMIN — IRBESARTAN 150 MG: 75 TABLET ORAL at 10:01

## 2018-01-22 RX ADMIN — METOCLOPRAMIDE HYDROCHLORIDE 10 MG: 5 SOLUTION ORAL at 05:01

## 2018-01-22 RX ADMIN — ESTRADIOL 0.5 MG: 0.5 TABLET ORAL at 10:01

## 2018-01-22 RX ADMIN — MAGNESIUM SULFATE IN WATER 2 G: 40 INJECTION, SOLUTION INTRAVENOUS at 02:01

## 2018-01-22 RX ADMIN — FLECAINIDE ACETATE 50 MG: 50 TABLET ORAL at 10:01

## 2018-01-22 RX ADMIN — FAMOTIDINE 20 MG: 10 INJECTION, SOLUTION INTRAVENOUS at 10:01

## 2018-01-22 RX ADMIN — FUROSEMIDE 40 MG: 10 INJECTION, SOLUTION INTRAMUSCULAR; INTRAVENOUS at 10:01

## 2018-01-22 RX ADMIN — ENOXAPARIN SODIUM 40 MG: 100 INJECTION SUBCUTANEOUS at 05:01

## 2018-01-22 RX ADMIN — HYDRALAZINE HYDROCHLORIDE 10 MG: 10 TABLET, FILM COATED ORAL at 01:01

## 2018-01-22 RX ADMIN — MINERAL OIL AND WHITE PETROLATUM: 150; 830 OINTMENT OPHTHALMIC at 10:01

## 2018-01-22 RX ADMIN — METOCLOPRAMIDE HYDROCHLORIDE 10 MG: 5 SOLUTION ORAL at 11:01

## 2018-01-22 RX ADMIN — HYDRALAZINE HYDROCHLORIDE 10 MG: 10 TABLET, FILM COATED ORAL at 10:01

## 2018-01-22 RX ADMIN — HYDRALAZINE HYDROCHLORIDE 10 MG: 10 TABLET, FILM COATED ORAL at 05:01

## 2018-01-22 RX ADMIN — ERYTHROMYCIN ETHYLSUCCINATE 200 MG: 200 GRANULE, FOR SUSPENSION ORAL at 11:01

## 2018-01-22 NOTE — PLAN OF CARE
Problem: Patient Care Overview  Goal: Plan of Care Review  Outcome: Ongoing (interventions implemented as appropriate)  Plan of care discussed with pt. Pt verbalizes understanding. Pt tolerating clear liquid diet with no complaints of discomfort or nausea. Pain managed with PRN pain medications. Pt ambulated in lowery 2 times this shift, tolerated well. Pt cont to have loose BM's. Pt had 2 BM this shift. Changed pt's J tube dressing 3x shift. JANET tube dressing had scant purulent drainage. Pt positions independently. VS stable. Pt remains free of falls and injury. Will continue to monitor.

## 2018-01-22 NOTE — PLAN OF CARE
Problem: Patient Care Overview  Goal: Plan of Care Review  Outcome: Ongoing (interventions implemented as appropriate)  Plan of care reviewed with  Patient, a 72 year old female with the DX of Ampula Carcinoma,,,,, Had a whipple on the 12th, Healed midline incision, Mitchell drain on the right side of abdomen, J tube on the left lower quadrant,,, Clear liquid diet and tube feedings, Midline on right upper arm Sl,,, up ad roel in room and lowery,,,,  Uneventful night, no falls, pain controlled with pRN meds,,, bed locked and low call light in reach,.

## 2018-01-22 NOTE — PLAN OF CARE
Pt not discharging home today as planned, per NP, Jenn Barry. Pt to discharge home tomorrow. SW notified Renaldo with [facility] and [facility]. SW to continue to follow-up.    [place] LCSW

## 2018-01-22 NOTE — PLAN OF CARE
Patient with formula change and is expected to discharge home with home health and tube feeds +/- 1/23/2018.  Will continue to follow for needs.       01/22/18 1419   Discharge Reassessment   Assessment Type Discharge Planning Reassessment   Provided patient/caregiver education on the expected discharge date and the discharge plan Yes   Do you have any problems affording any of your prescribed medications? No   Discharge Plan A Home with family;Home Health   Can the patient answer the patient profile reliably? Yes, cognitively intact   Describe the patient's ability to walk at the present time. Minor restrictions or changes   How often would a person be available to care for the patient? Whenever needed

## 2018-01-22 NOTE — PLAN OF CARE
SW learned in morning huddle that Pt may be able to discharge home today, but her tube feed formula has changed. LUC sent updated orders to both [facility] and [facility] in [facility]. LUC also placed call to Renaldo with [facility] ([phone]) to inform him of above. SW to continue to follow.     [place], FELIPEW

## 2018-01-22 NOTE — ASSESSMENT & PLAN NOTE
73 yo male s/p whipple, common bile duct exploration and j-tube placement.     -QOD labs   -replete lytes as needed  -pulm toilet- IS, ramses   -PT/OT. Patient needs to be out of bed in chair majority of day. She needs to be walking halls multiple times a day. Will require motivation and encourage from nursing/PT.  -Strict I/Os   -continue erythromycin through J  -continue tube feeds tonight at 70cc from 8pto8a, at half strength  -Tolerating CLD, possible advance to regular diet today- will discuss with staff  -Hycet through Jtube, PRN IV morphine for breakthrough pain  -dvt ppx    - will discuss with staff

## 2018-01-22 NOTE — PROGRESS NOTES
[facility]  General Surgery  Progress Note    Subjective:     History of Present Illness:  Vonnie Lees is a 72 y.o. 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 Patient initially presented in late 2017 for mild RUQ pain and nausea. She also has noticed scleral icterus. She had elevated bilirubin and mildly elevated LFTs and was scheduled for abdominal US on 11/21. She was found to have intrahepatic biliary dilation and CBD dilatation. Since she has had MRI/MRCP on 12/2 revealing intra and extrahepatic biliary dilation to the level of the sphincter of oddi with no obvious masses. She underwent EUS/ERCP on 12/14 which showed dilated bile duct to 14mm, mass at the ampulla (17 mm x10 mm). Had stricture sphincterectomy and covered stent placed in the CBD. She has also had stent placed in duodenum. Since procedure patient reports decreased nausea. She no longer has scleral icterus and itching.   Denies fever, chills, fatigue, recent weight loss, chest pain, shortness of breath, vomiting, jaundice, change in bowel movements, diarrhea or constipation.  Has underwent cardiac clearance.   Had previous laparoscopic cholecystectomy and open hysterectomy with lower transverse incision.     Post-Op Info:  Procedure(s) (LRB):  WHIPPLE (N/A)  EXPLORATION-COMMON BILE DUCT (N/A)  PLACEMENT-TUBE-JEJUNOSTOMY (N/A)   10 Days Post-Op     Interval History: POD #10 whipple, common bile duct exploration and j-tube placement.     Tolerating tube feeds half strength at 70mL/hr. Decreased diarrhea yesterday.. Right abdominal drain with serosanguinous output, 87mL overnight. Stitch placed around JANET site yesterday with improvement in drainage around tube. Voiding, UOP adequate.    Medications:  Continuous Infusions:    Scheduled Meds:   bisacodyl  10 mg Rectal Daily    enoxaparin   40 mg Subcutaneous Daily    erythromycin ethylsuccinate  200 mg Per J Tube Q6H    estradiol  0.5 mg Oral Daily    famotidine (PF)  20 mg Intravenous Daily    flecainide  50 mg Oral BID    furosemide  40 mg Intravenous Once    hydrALAZINE  10 mg Oral TID    irbesartan  150 mg Oral BID    metoclopramide HCl  10 mg Per J Tube Q6H    white petrolatum-mineral oil   Both Eyes QHS     PRN Meds:ALPRAZolam, calcium carbonate, diphenhydrAMINE, hydrALAZINE, hydrocodone-apap 7.5-325 MG/15 ML, hydrocodone-apap 7.5-325 MG/15 ML, labetalol, morphine, ondansetron     Review of patient's allergies indicates:   Allergen Reactions    Aldoril d30 [methyldopa-hydrochlorothiazide]      Itching eyeball    Flagyl [metronidazole hcl] Other (See Comments)     Yeast infection.    Hydrochlorothiazide      Itching eyeball.    Keflex [cephalexin] Diarrhea    Zithromax [azithromycin] Diarrhea     Objective:     Vital Signs (Most Recent):  Temp: 98.8 °F (37.1 °C) (01/22/18 0406)  Pulse: 67 (01/22/18 0406)  Resp: 17 (01/22/18 0406)  BP: 130/63 (01/22/18 0406)  SpO2: (!) 91 % (01/22/18 0406) Vital Signs (24h Range):  Temp:  [97.1 °F (36.2 °C)-98.8 °F (37.1 °C)] 98.8 °F (37.1 °C)  Pulse:  [67-77] 67  Resp:  [14-18] 17  SpO2:  [91 %-99 %] 91 %  BP: (128-170)/(58-77) 130/63     Weight: 73.5 kg (161 lb 15.9 oz)  Body mass index is 28.7 kg/m².    Intake/Output - Last 3 Shifts       01/20 0700 - 01/21 0659 01/21 0700 - 01/22 0659 01/22 0700 - 01/23 0659    P.O. 100 365     NG/ 760     Total Intake(mL/kg) 820 (11.2) 1125 (15.3)     Urine (mL/kg/hr) 850 (0.5) 1350 (0.8)     Emesis/NG output 0 (0) 0 (0)     Drains 45 (0) 92 (0.1)     Other 0 (0) 0 (0)     Stool 0 (0) 0 (0)     Blood 0 (0) 0 (0)     Total Output 895 1442      Net -75 -317             Urine Occurrence 2 x 4 x     Stool Occurrence 3 x 0 x     Emesis Occurrence 0 x 0 x         Physical Exam   Constitutional: She is oriented to person, place, and time. She appears  well-developed and well-nourished. No distress.   HENT:   Head: Normocephalic and atraumatic.   Eyes: EOM are normal. Pupils are equal, round, and reactive to light.   Neck: Normal range of motion. Neck supple.   Cardiovascular: Normal rate, regular rhythm and intact distal pulses.    Pulmonary/Chest: Effort normal. No respiratory distress.   Abdominal: Soft. She exhibits no distension. There is tenderness (at incision site).   Midline incision c/d/i. Jtube in place with decreased serous oozing from around site. .    Musculoskeletal: Normal range of motion.   Neurological: She is alert and oriented to person, place, and time.   Skin: Skin is warm and dry. She is not diaphoretic.     Significant Labs:  CBC:     Recent Labs  Lab 01/22/18 0450   WBC 10.65   RBC 2.47*   HGB 7.2*   HCT 22.1*      MCV 90   MCH 29.1   MCHC 32.6     BMP:     Recent Labs  Lab 01/22/18 0450   *      K 3.0*      CO2 26   BUN 8   CREATININE 0.7   CALCIUM 8.0*   MG 1.8     CMP:     Recent Labs  Lab 01/22/18 0450   *   CALCIUM 8.0*   ALBUMIN 2.1*   PROT 4.8*      K 3.0*   CO2 26      BUN 8   CREATININE 0.7   ALKPHOS 89   ALT 15   AST 14   BILITOT 0.2     LFTs:     Recent Labs  Lab 01/22/18 0450   ALT 15   AST 14   ALKPHOS 89   BILITOT 0.2   PROT 4.8*   ALBUMIN 2.1*     Coagulation: No results for input(s): LABPROT, INR, APTT in the last 168 hours.      Assessment/Plan:     * Ampullary carcinoma    73 yo male s/p whipple, common bile duct exploration and j-tube placement.     -QOD labs   -replete lytes as needed  -pulm toilet- IS, duonebs   -PT/OT. Patient needs to be out of bed in chair majority of day. She needs to be walking halls multiple times a day. Will require motivation and encourage from nursing/PT.  -Strict I/Os   -continue erythromycin through J  -continue tube feeds tonight at 70cc from 8pto8a, at half strength  -Tolerating CLD, possible advance to FLDtoday- will discuss with  staff  -Hycet through Jtube, PRN IV morphine for breakthrough pain  -dvt ppx    - will discuss with staff    DISPO: Possible discharge home today or tomorrow. Will go home with TF.          Niki James MD  General Surgery  [facility]

## 2018-01-22 NOTE — PLAN OF CARE
LUC placed call to Andrew at the [facility] ([phone]) to extend Pt's 's stay in the event she doesn't go home today. Andrew said he could extend until Friday.     [place] LCSW

## 2018-01-22 NOTE — SUBJECTIVE & OBJECTIVE
Interval History: POD #10 whipple, common bile duct exploration and j-tube placement.     Tolerating tube feeds half strength at 70mL/hr. Decreased diarrhea yesterday.. Right abdominal drain with serosanguinous output, 87mL overnight. Stitch placed around JANET site yesterday with improvement in drainage around tube. Voiding, UOP adequate.    Medications:  Continuous Infusions:    Scheduled Meds:   bisacodyl  10 mg Rectal Daily    enoxaparin  40 mg Subcutaneous Daily    erythromycin ethylsuccinate  200 mg Per J Tube Q6H    estradiol  0.5 mg Oral Daily    famotidine (PF)  20 mg Intravenous Daily    flecainide  50 mg Oral BID    furosemide  40 mg Intravenous Once    hydrALAZINE  10 mg Oral TID    irbesartan  150 mg Oral BID    metoclopramide HCl  10 mg Per J Tube Q6H    white petrolatum-mineral oil   Both Eyes QHS     PRN Meds:ALPRAZolam, calcium carbonate, diphenhydrAMINE, hydrALAZINE, hydrocodone-apap 7.5-325 MG/15 ML, hydrocodone-apap 7.5-325 MG/15 ML, labetalol, morphine, ondansetron     Review of patient's allergies indicates:   Allergen Reactions    Aldoril d30 [methyldopa-hydrochlorothiazide]      Itching eyeball    Flagyl [metronidazole hcl] Other (See Comments)     Yeast infection.    Hydrochlorothiazide      Itching eyeball.    Keflex [cephalexin] Diarrhea    Zithromax [azithromycin] Diarrhea     Objective:     Vital Signs (Most Recent):  Temp: 98.8 °F (37.1 °C) (01/22/18 0406)  Pulse: 67 (01/22/18 0406)  Resp: 17 (01/22/18 0406)  BP: 130/63 (01/22/18 0406)  SpO2: (!) 91 % (01/22/18 0406) Vital Signs (24h Range):  Temp:  [97.1 °F (36.2 °C)-98.8 °F (37.1 °C)] 98.8 °F (37.1 °C)  Pulse:  [67-77] 67  Resp:  [14-18] 17  SpO2:  [91 %-99 %] 91 %  BP: (128-170)/(58-77) 130/63     Weight: 73.5 kg (161 lb 15.9 oz)  Body mass index is 28.7 kg/m².    Intake/Output - Last 3 Shifts       01/20 0700 - 01/21 0659 01/21 0700 - 01/22 0659 01/22 0700 - 01/23 0659    P.O. 100 365     NG/ 760     Total  Intake(mL/kg) 820 (11.2) 1125 (15.3)     Urine (mL/kg/hr) 850 (0.5) 1350 (0.8)     Emesis/NG output 0 (0) 0 (0)     Drains 45 (0) 92 (0.1)     Other 0 (0) 0 (0)     Stool 0 (0) 0 (0)     Blood 0 (0) 0 (0)     Total Output 895 1442      Net -75 -317             Urine Occurrence 2 x 4 x     Stool Occurrence 3 x 0 x     Emesis Occurrence 0 x 0 x         Physical Exam   Constitutional: She is oriented to person, place, and time. She appears well-developed and well-nourished. No distress.   HENT:   Head: Normocephalic and atraumatic.   Eyes: EOM are normal. Pupils are equal, round, and reactive to light.   Neck: Normal range of motion. Neck supple.   Cardiovascular: Normal rate, regular rhythm and intact distal pulses.    Pulmonary/Chest: Effort normal. No respiratory distress.   Abdominal: Soft. She exhibits no distension. There is tenderness (at incision site).   Midline incision c/d/i. Jtube in place with decreased serous oozing from around site. .    Musculoskeletal: Normal range of motion.   Neurological: She is alert and oriented to person, place, and time.   Skin: Skin is warm and dry. She is not diaphoretic.     Significant Labs:  CBC:     Recent Labs  Lab 01/22/18  0450   WBC 10.65   RBC 2.47*   HGB 7.2*   HCT 22.1*      MCV 90   MCH 29.1   MCHC 32.6     BMP:     Recent Labs  Lab 01/22/18  0450   *      K 3.0*      CO2 26   BUN 8   CREATININE 0.7   CALCIUM 8.0*   MG 1.8     CMP:     Recent Labs  Lab 01/22/18  0450   *   CALCIUM 8.0*   ALBUMIN 2.1*   PROT 4.8*      K 3.0*   CO2 26      BUN 8   CREATININE 0.7   ALKPHOS 89   ALT 15   AST 14   BILITOT 0.2     LFTs:     Recent Labs  Lab 01/22/18  0450   ALT 15   AST 14   ALKPHOS 89   BILITOT 0.2   PROT 4.8*   ALBUMIN 2.1*     Coagulation: No results for input(s): LABPROT, INR, APTT in the last 168 hours.

## 2018-01-23 VITALS
HEIGHT: 63 IN | DIASTOLIC BLOOD PRESSURE: 61 MMHG | TEMPERATURE: 98 F | HEART RATE: 72 BPM | WEIGHT: 162 LBS | BODY MASS INDEX: 28.7 KG/M2 | OXYGEN SATURATION: 95 % | RESPIRATION RATE: 18 BRPM | SYSTOLIC BLOOD PRESSURE: 132 MMHG

## 2018-01-23 PROCEDURE — 63600175 PHARM REV CODE 636 W HCPCS: Performed by: NURSE PRACTITIONER

## 2018-01-23 PROCEDURE — 25000003 PHARM REV CODE 250: Performed by: NURSE PRACTITIONER

## 2018-01-23 PROCEDURE — 25000003 PHARM REV CODE 250: Performed by: SURGERY

## 2018-01-23 PROCEDURE — S0028 INJECTION, FAMOTIDINE, 20 MG: HCPCS | Performed by: NURSE PRACTITIONER

## 2018-01-23 PROCEDURE — 25000003 PHARM REV CODE 250: Performed by: STUDENT IN AN ORGANIZED HEALTH CARE EDUCATION/TRAINING PROGRAM

## 2018-01-23 RX ORDER — ONDANSETRON 4 MG/1
4 TABLET, FILM COATED ORAL EVERY 12 HOURS PRN
Qty: 3 TABLET | Refills: 0 | Status: SHIPPED | OUTPATIENT
Start: 2018-01-23

## 2018-01-23 RX ORDER — METOCLOPRAMIDE 10 MG/1
10 TABLET ORAL
Qty: 15 TABLET | Refills: 0 | Status: SHIPPED | OUTPATIENT
Start: 2018-01-23 | End: 2018-01-28

## 2018-01-23 RX ORDER — ONDANSETRON 4 MG/1
4 TABLET, FILM COATED ORAL EVERY 12 HOURS PRN
Status: DISCONTINUED | OUTPATIENT
Start: 2018-01-23 | End: 2018-01-23 | Stop reason: HOSPADM

## 2018-01-23 RX ORDER — ENOXAPARIN SODIUM 100 MG/ML
40 INJECTION SUBCUTANEOUS EVERY 24 HOURS
Status: DISCONTINUED | OUTPATIENT
Start: 2018-01-23 | End: 2018-01-23 | Stop reason: HOSPADM

## 2018-01-23 RX ORDER — HYDROCODONE BITARTRATE AND ACETAMINOPHEN 7.5; 325 MG/15ML; MG/15ML
15 SOLUTION ORAL EVERY 6 HOURS PRN
Qty: 473 ML | Refills: 0 | Status: SHIPPED | OUTPATIENT
Start: 2018-01-23

## 2018-01-23 RX ORDER — METOCLOPRAMIDE 10 MG/1
10 TABLET ORAL
Status: DISCONTINUED | OUTPATIENT
Start: 2018-01-23 | End: 2018-01-23 | Stop reason: HOSPADM

## 2018-01-23 RX ADMIN — HYDRALAZINE HYDROCHLORIDE 10 MG: 10 TABLET, FILM COATED ORAL at 01:01

## 2018-01-23 RX ADMIN — ONDANSETRON 4 MG: 4 TABLET, FILM COATED ORAL at 02:01

## 2018-01-23 RX ADMIN — HYDROCODONE BITARTRATE AND ACETAMINOPHEN 30 ML: 7.5; 325 SOLUTION ORAL at 03:01

## 2018-01-23 RX ADMIN — HYDROCODONE BITARTRATE AND ACETAMINOPHEN 30 ML: 7.5; 325 SOLUTION ORAL at 10:01

## 2018-01-23 RX ADMIN — FLECAINIDE ACETATE 50 MG: 50 TABLET ORAL at 08:01

## 2018-01-23 RX ADMIN — FAMOTIDINE 20 MG: 10 INJECTION, SOLUTION INTRAVENOUS at 08:01

## 2018-01-23 RX ADMIN — ENOXAPARIN SODIUM 40 MG: 100 INJECTION SUBCUTANEOUS at 01:01

## 2018-01-23 RX ADMIN — METOCLOPRAMIDE HYDROCHLORIDE 10 MG: 5 SOLUTION ORAL at 05:01

## 2018-01-23 RX ADMIN — ESTRADIOL 0.5 MG: 0.5 TABLET ORAL at 09:01

## 2018-01-23 RX ADMIN — ALPRAZOLAM 0.5 MG: 0.5 TABLET ORAL at 02:01

## 2018-01-23 RX ADMIN — HYDRALAZINE HYDROCHLORIDE 10 MG: 10 TABLET, FILM COATED ORAL at 05:01

## 2018-01-23 RX ADMIN — IRBESARTAN 150 MG: 75 TABLET ORAL at 09:01

## 2018-01-23 RX ADMIN — METOCLOPRAMIDE 10 MG: 10 TABLET ORAL at 11:01

## 2018-01-23 RX ADMIN — HYDROCODONE BITARTRATE AND ACETAMINOPHEN 30 ML: 7.5; 325 SOLUTION ORAL at 04:01

## 2018-01-23 RX ADMIN — ERYTHROMYCIN ETHYLSUCCINATE 200 MG: 200 GRANULE, FOR SUSPENSION ORAL at 05:01

## 2018-01-23 RX ADMIN — PSYLLIUM HUSK 1 PACKET: 3.4 POWDER ORAL at 11:01

## 2018-01-23 RX ADMIN — METOCLOPRAMIDE 10 MG: 10 TABLET ORAL at 04:01

## 2018-01-23 NOTE — PROGRESS NOTES
Pt discharged home with family and home health.  Discharge instructions and education given.  Pt and family stated understanding.  Scripts given and pharmacy delivered meds to room. Walker and feeding pump and food delivered.  Transport wheeled pt down.

## 2018-01-23 NOTE — SUBJECTIVE & OBJECTIVE
Interval History: POD #11 whipple, common bile duct exploration and j-tube placement.     Tolerating tube feeds half strength at 80mL/hr. Continues to have diarrhea. Cdiff negative. Tolerated FLD with no nausea or vomit. Right abdominal drain with no recorded output per nursing.    Medications:  Continuous Infusions:    Scheduled Meds:   bisacodyl  10 mg Rectal Daily    enoxaparin  40 mg Subcutaneous Daily    erythromycin ethylsuccinate  200 mg Per J Tube Q6H    estradiol  0.5 mg Oral Daily    famotidine (PF)  20 mg Intravenous Daily    flecainide  50 mg Oral BID    hydrALAZINE  10 mg Oral TID    irbesartan  150 mg Oral BID    metoclopramide HCl  10 mg Per J Tube Q6H    white petrolatum-mineral oil   Both Eyes QHS     PRN Meds:ALPRAZolam, calcium carbonate, diphenhydrAMINE, hydrALAZINE, hydrocodone-apap 7.5-325 MG/15 ML, hydrocodone-apap 7.5-325 MG/15 ML, labetalol, morphine, ondansetron     Review of patient's allergies indicates:   Allergen Reactions    Aldoril d30 [methyldopa-hydrochlorothiazide]      Itching eyeball    Flagyl [metronidazole hcl] Other (See Comments)     Yeast infection.    Hydrochlorothiazide      Itching eyeball.    Keflex [cephalexin] Diarrhea    Zithromax [azithromycin] Diarrhea     Objective:     Vital Signs (Most Recent):  Temp: 97 °F (36.1 °C) (01/23/18 0757)  Pulse: 80 (01/23/18 0757)  Resp: 18 (01/23/18 0757)  BP: (!) 153/75 (01/23/18 0757)  SpO2: 95 % (01/23/18 0757) Vital Signs (24h Range):  Temp:  [97 °F (36.1 °C)-98.6 °F (37 °C)] 97 °F (36.1 °C)  Pulse:  [68-80] 80  Resp:  [16-18] 18  SpO2:  [92 %-98 %] 95 %  BP: (120-153)/(58-75) 153/75     Weight: 73.5 kg (161 lb 15.9 oz)  Body mass index is 28.7 kg/m².    Intake/Output - Last 3 Shifts       01/21 0700 - 01/22 0659 01/22 0700 - 01/23 0659 01/23 0700 - 01/24 0659    P.O. 365 1200     I.V. (mL/kg)  50 (0.7)     NG/ 1291     Total Intake(mL/kg) 1125 (15.3) 2541 (34.6)     Urine (mL/kg/hr) 1350 (0.8) 0 (0)      Emesis/NG output 0 (0)      Drains 92 (0.1) 0 (0)     Other 0 (0)      Stool 0 (0) 0 (0)     Blood 0 (0)      Total Output 1442 0      Net -317 +2541             Urine Occurrence 4 x 9 x     Stool Occurrence 0 x 6 x     Emesis Occurrence 0 x          Physical Exam   Constitutional: She is oriented to person, place, and time. She appears well-developed and well-nourished. No distress.   HENT:   Head: Normocephalic and atraumatic.   Eyes: EOM are normal. Pupils are equal, round, and reactive to light.   Neck: Normal range of motion. Neck supple.   Cardiovascular: Normal rate, regular rhythm and intact distal pulses.    Pulmonary/Chest: Effort normal. No respiratory distress.   Abdominal: Soft. She exhibits no distension. There is tenderness (at incision site).   Midline incision c/d/i. Jtube in place.   Musculoskeletal: Normal range of motion.   Neurological: She is alert and oriented to person, place, and time.   Skin: Skin is warm and dry. She is not diaphoretic.     Significant Labs:  CBC:     Recent Labs  Lab 01/22/18  0450   WBC 10.65   RBC 2.47*   HGB 7.2*   HCT 22.1*      MCV 90   MCH 29.1   MCHC 32.6     BMP:     Recent Labs  Lab 01/22/18  0450   *      K 3.0*      CO2 26   BUN 8   CREATININE 0.7   CALCIUM 8.0*   MG 1.8     CMP:     Recent Labs  Lab 01/22/18  0450   *   CALCIUM 8.0*   ALBUMIN 2.1*   PROT 4.8*      K 3.0*   CO2 26      BUN 8   CREATININE 0.7   ALKPHOS 89   ALT 15   AST 14   BILITOT 0.2     LFTs:     Recent Labs  Lab 01/22/18  0450   ALT 15   AST 14   ALKPHOS 89   BILITOT 0.2   PROT 4.8*   ALBUMIN 2.1*     Coagulation: No results for input(s): LABPROT, INR, APTT in the last 168 hours.

## 2018-01-23 NOTE — PLAN OF CARE
LUC learned in morning huddle from NP, Jenn Barry, that Pt would be discharging home today with home health and home tube feeds. LUC notified Renaldo with [facility] ([phone]) and [facility] ([phone]) of Pt's discharge.    FELIPE SawyerW

## 2018-01-23 NOTE — DISCHARGE SUMMARY
[facility]  General Surgery  Discharge Summary      Patient Name: Vonnie Lees  MRN: [id number]  Admission Date: 1/12/2018  Hospital Length of Stay: 11 days  Discharge Date and Time:  01/23/2018 11:49 AM  Attending Physician: Jeovany Rangel MD   Discharging Provider: Jenn Durand NP  Primary Care Provider: Brenton Castro MD     HPI: Vonnie Lees is a 72 y.o. female with history of TIA, IBS, HTN, hepatitis, GERD, paroxysmal ventricular bigeminy who was recently diagnosed ampullar adenocarcinoma schedule for Whipple procedure tomorrow 1/12/18. Patient initially presented in late 2017 for mild RUQ pain and nausea. She also has noticed scleral icterus. She had elevated bilirubin and mildly elevated LFTs and was scheduled for abdominal US on 11/21. She was found to have intrahepatic biliary dilation and CBD dilatation. Since she has had MRI/MRCP on 12/2 revealing intra and extrahepatic biliary dilation to the level of the sphincter of oddi with no obvious masses. She underwent EUS/ERCP on 12/14 which showed dilated bile duct to 14mm, mass at the ampulla (17 mm x10 mm). Had stricture sphincterectomy and covered stent placed in the CBD. She has also had stent placed in duodenum. Since procedure patient reports decreased nausea. She no longer has scleral icterus and itching.   Denies fever, chills, fatigue, recent weight loss, chest pain, shortness of breath, vomiting, jaundice, change in bowel movements, diarrhea or constipation.  Has underwent cardiac clearance.   Had previous laparoscopic cholecystectomy and open hysterectomy with lower transverse incision.     Procedure(s) (LRB):  WHIPPLE (N/A)  EXPLORATION-COMMON BILE DUCT (N/A)  PLACEMENT-TUBE-JEJUNOSTOMY (N/A)     Date of Procedure: 1/12/2018      Surgeon(s) and Role:     * Jeovany Rangel MD - Primary     * Ross Olmedo MD - Resident - Assisting        Pre-Operative Diagnosis: Ampullary  Adenocarcinoma     Pre-Operative Variables:  Preoperative diagnosis: Ampullary adenoca  Going into surgery, the lesion was believed to be resectable  Chemotherapy within 90 Days: No  Radiation Therapy within 90 Days: No  Preoperative Obstructive Jaundice: Yes  Preoperative Biliary Stent: Yes, covered metal     Post-Operative Diagnosis:     1. Same     Anesthesia: General ETT     Operative Findings:    1. No evidence of distant intraperitoneal metastases   2. RO or R1 resection based on final pathology  3. Frozen sections of bile duct and pancreatic margins negative  4. Reconstruction via retrocolic joyce limb to PJ and HJ, antecolic gastro-J to proximal jejunum  5. Pancreas remnant: soft, duct: 2 mm  6. PD stent:  No  7. Bile duct:  12 mm, thick-walled, bile: clear     Procedures:  1. Pancreaticoduodenectomy (Whipple Procedure), non-pylorus preserving.  2. Open Common Bile Duct Exploration   3. Feeding Tube Jejunostomy    Hospital Course: The patient is tolerating a regular diet.  She is tolerating tube feeds Peptamen via j-tube at 70cc/hour from 8pm-8am.  She is having episodes of diarrhea with a c-diff negative on 1/19/2018.  She is voiding and ambulating without difficulty.  Patient with no complaints of nausea, vomiting, chest pain or shortness of breath.  Her vital signs stable. She is afebrile. She is positive for flatus and positive for bowel sounds.  CV: RRR  Lungs: CTA bilaterally  Abdomen:  Soft, non-tender, non-distended, positive for bowel sounds, incision clean, dry and intact.    Consults:   Consults         Status Ordering Provider     Inpatient consult to Midline team  Once     Provider:  (Not yet assigned)    Completed LUZ CATES     Inpatient consult to Registered Dietitian/Nutritionist  Once     Provider:  (Not yet assigned)    Completed SHAWANDA BULLOCK JR.     Inpatient consult to Registered Dietitian/Nutritionist  Once     Provider:  (Not yet assigned)    Completed KATELIN REYNAGA           Significant Diagnostic Studies: Labs:   CMP   Recent Labs  Lab 01/22/18 0450      K 3.0*      CO2 26   *   BUN 8   CREATININE 0.7   CALCIUM 8.0*   PROT 4.8*   ALBUMIN 2.1*   BILITOT 0.2   ALKPHOS 89   AST 14   ALT 15   ANIONGAP 7*   ESTGFRAFRICA >60.0   EGFRNONAA >60.0    and CBC   Recent Labs  Lab 01/22/18 0450   WBC 10.65   HGB 7.2*   HCT 22.1*          Pending Diagnostic Studies:     Procedure Component Value Units Date/Time    CBC auto differential [[id number]] Collected:  01/20/18 0431    Order Status:  Sent Lab Status:  In process Updated:  01/20/18 0432    Specimen:  Blood from Blood     CBC auto differential [[id number]] Collected:  01/12/18 1652    Order Status:  Sent Lab Status:  In process Updated:  01/12/18 1653    Specimen:  Blood from Blood     Calcium, ionized [[id number]] Collected:  01/20/18 0431    Order Status:  Sent Lab Status:  In process Updated:  01/20/18 0432    Specimen:  Blood from Blood     Comprehensive metabolic panel [[id number]] Collected:  01/20/18 0431    Order Status:  Sent Lab Status:  In process Updated:  01/20/18 0432    Specimen:  Blood from Blood     Magnesium [[id number]] Collected:  01/20/18 0431    Order Status:  Sent Lab Status:  In process Updated:  01/20/18 0432    Specimen:  Blood from Blood     Phosphorus [[id number]] Collected:  01/20/18 0431    Order Status:  Sent Lab Status:  In process Updated:  01/20/18 0432    Specimen:  Blood from Blood         Final Active Diagnoses:    Diagnosis Date Noted POA    PRINCIPAL PROBLEM:  Ampullary carcinoma [C24.1] 01/12/2018 Yes      Problems Resolved During this Admission:    Diagnosis Date Noted Date Resolved POA      Discharged Condition: good    Disposition: Home or Self Care    Follow Up:  Follow-up Information     [place]    Specialties:  Pharmacist, DME Provider, IV Infusion  Why:  Infusion  Contact information:  [address] [address]  [phone]        "      [facility] .    Contact information:  [phone]            Jeovany Rangel MD On 1/30/2018.    Specialties:  General Surgery, Surgical Oncology  Why:  SURGICAL FOLLOW UP with Dr Rangel 1/30/2018 @ 11:30am in the [facility]  Contact information:  [address]  [facility] [address]  [phone]                 Patient Instructions:     WALKER FOR HOME USE   Order Specific Question Answer Comments   Type of Walker: Adult (5'4"-6'6")    With wheels? Yes    Height: 5' 3" (1.6 m)    Weight: 73.5 kg (162 lb)    Length of need (1-99 months): 99    Does patient have medical equipment at home? none    Please check all that apply: Patient's condition impairs ambulation.    Please check all that apply: Patient is unable to safely ambulate without equipment.    Please check all that apply: Walker will be used for gait training.      Diet Adult Regular     Activity as tolerated     Lifting restrictions   Scheduling Instructions: No lifting > 10 pounds.  May shower.  Empty drain to right abdomen daily and record output.     Notify your health care provider if you experience any of the following:  temperature >100.4     Notify your health care provider if you experience any of the following:  persistent nausea and vomiting or diarrhea     Notify your health care provider if you experience any of the following:  severe uncontrolled pain     Notify your health care provider if you experience any of the following:  redness, tenderness, or signs of infection (pain, swelling, redness, odor or green/yellow discharge around incision site)     Notify your health care provider if you experience any of the following:  difficulty breathing or increased cough     Notify your health care provider if you experience any of the following:  severe persistent headache     Notify your health care provider if you experience any of the following:  worsening rash     Notify your health care provider if you experience any of " the following:  persistent dizziness, light-headedness, or visual disturbances     Notify your health care provider if you experience any of the following:  increased confusion or weakness     Notify your health care provider if you experience any of the following:     Change dressing (specify)   Order Comments: Dressing change: to jejunostomy tube site daily and as needed with gauze and paper tape.     Incentive spirometry   Standing Status: Future  Standing Exp. Date: 03/12/19       Medications:  Reconciled Home Medications:   Current Discharge Medication List      START taking these medications    Details   enoxaparin (LOVENOX) 40 mg/0.4 mL Syrg Inject 0.4 mLs (40 mg total) into the skin once daily.  Qty: 5.6 mL, Refills: 0    Comments: Please deliver to room 645a      hydrocodone-acetaminophen (HYCET) solution 7.5-325 mg/15mL 15 mLs by Per J Tube route every 6 (six) hours as needed.  Qty: 473 mL, Refills: 0      metoclopramide HCl (REGLAN) 10 MG tablet Take 1 tablet (10 mg total) by mouth 3 (three) times daily before meals.  Qty: 15 tablet, Refills: 0      psyllium husk, aspartame, 3.4 gram PwPk Take 1 packet by mouth once daily. May take over the counter.  Qty: 10 packet, Refills: 0         CONTINUE these medications which have NOT CHANGED    Details   ALPRAZolam (XANAX) 0.5 MG tablet Take 0.5 mg by mouth 3 (three) times daily as needed for Anxiety.      cyclobenzaprine (FLEXERIL) 10 MG tablet Take 10 mg by mouth 3 (three) times daily as needed for Muscle spasms.      diphenhydrAMINE (BENADRYL) 25 mg capsule Take 25 mg by mouth every 6 (six) hours as needed for Allergies.      flecainide (TAMBOCOR) 50 MG Tab Take 50 mg by mouth 2 (two) times daily.       furosemide (LASIX) 20 MG tablet Take 20 mg by mouth as needed.       hydrALAZINE (APRESOLINE) 10 MG tablet Take 10 mg by mouth 3 (three) times daily.      irbesartan (AVAPRO) 300 MG tablet Take 150 mg by mouth 2 (two) times daily.       lipase-protease-amylase  24,000-76,000-120,000 units (CREON) 24,000-76,000 -120,000 unit capsule Take 2 capsules by mouth 3 (three) times daily with meals. Take 1 capsule w snacks  Qty: 240 capsule, Refills: 0      magnesium 250 mg Tab Take 1 tablet by mouth 2 (two) times daily.       omeprazole (PRILOSEC) 40 MG capsule Take 40 mg by mouth once daily.      verapamil (CALAN) 80 MG tablet Take 80 mg by mouth once daily. Takes in the afternoon      aspirin (ECOTRIN) 81 MG EC tablet Take 81 mg by mouth once daily.      estradiol (ESTRACE) 0.5 MG tablet Take 0.5 mg by mouth once daily.             Jenn Durand, NP  General Surgery  [facility]

## 2018-01-23 NOTE — PLAN OF CARE
Problem: Patient Care Overview  Goal: Plan of Care Review  Outcome: Ongoing (interventions implemented as appropriate)  Pt A & O x 4, pain controlled with prn pain meds, adequate urine output.  Pt also had 4 x loose Bms.  Vital signs stable on room air.  Pt teaching of Lovenox use at home.  Pt returned demonstration of giving injection.

## 2018-01-23 NOTE — ASSESSMENT & PLAN NOTE
73 yo male s/p whipple, common bile duct exploration and j-tube placement.     -QOD labs   -replete lytes as needed  -pulm toilet- IS, duonebs   -PT/OT. Encourage ambulation and OOTBC  -Strict I/Os   -continue tube feeds tonight at 80cc from 8pto8a, at half strength. She is at goal rate  -Tolerating FLD, advance to regular diet today  -Hycet through Jtube, PRN IV morphine for breakthrough pain  -dvt ppx      Dispo: Plan to discharge home today with TF running at goal 80cc/hr and regular diet. Will discuss with staff prior to discharge

## 2018-01-23 NOTE — PROGRESS NOTES
[facility]  General Surgery  Progress Note    Subjective:     History of Present Illness:  Vonnie Lees is a 72 y.o. 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 Patient initially presented in late 2017 for mild RUQ pain and nausea. She also has noticed scleral icterus. She had elevated bilirubin and mildly elevated LFTs and was scheduled for abdominal US on 11/21. She was found to have intrahepatic biliary dilation and CBD dilatation. Since she has had MRI/MRCP on 12/2 revealing intra and extrahepatic biliary dilation to the level of the sphincter of oddi with no obvious masses. She underwent EUS/ERCP on 12/14 which showed dilated bile duct to 14mm, mass at the ampulla (17 mm x10 mm). Had stricture sphincterectomy and covered stent placed in the CBD. She has also had stent placed in duodenum. Since procedure patient reports decreased nausea. She no longer has scleral icterus and itching.   Denies fever, chills, fatigue, recent weight loss, chest pain, shortness of breath, vomiting, jaundice, change in bowel movements, diarrhea or constipation.  Has underwent cardiac clearance.   Had previous laparoscopic cholecystectomy and open hysterectomy with lower transverse incision.     Post-Op Info:  Procedure(s) (LRB):  WHIPPLE (N/A)  EXPLORATION-COMMON BILE DUCT (N/A)  PLACEMENT-TUBE-JEJUNOSTOMY (N/A)   11 Days Post-Op     Interval History: POD #11 whipple, common bile duct exploration and j-tube placement.     Tolerating tube feeds half strength at 80mL/hr. Continues to have diarrhea. Cdiff negative. Tolerated FLD with no nausea or vomit. Right abdominal drain with no recorded output per nursing.    Medications:  Continuous Infusions:    Scheduled Meds:   bisacodyl  10 mg Rectal Daily    enoxaparin  40 mg Subcutaneous Daily    erythromycin ethylsuccinate   200 mg Per J Tube Q6H    estradiol  0.5 mg Oral Daily    famotidine (PF)  20 mg Intravenous Daily    flecainide  50 mg Oral BID    hydrALAZINE  10 mg Oral TID    irbesartan  150 mg Oral BID    metoclopramide HCl  10 mg Per J Tube Q6H    white petrolatum-mineral oil   Both Eyes QHS     PRN Meds:ALPRAZolam, calcium carbonate, diphenhydrAMINE, hydrALAZINE, hydrocodone-apap 7.5-325 MG/15 ML, hydrocodone-apap 7.5-325 MG/15 ML, labetalol, morphine, ondansetron     Review of patient's allergies indicates:   Allergen Reactions    Aldoril d30 [methyldopa-hydrochlorothiazide]      Itching eyeball    Flagyl [metronidazole hcl] Other (See Comments)     Yeast infection.    Hydrochlorothiazide      Itching eyeball.    Keflex [cephalexin] Diarrhea    Zithromax [azithromycin] Diarrhea     Objective:     Vital Signs (Most Recent):  Temp: 97 °F (36.1 °C) (01/23/18 0757)  Pulse: 80 (01/23/18 0757)  Resp: 18 (01/23/18 0757)  BP: (!) 153/75 (01/23/18 0757)  SpO2: 95 % (01/23/18 0757) Vital Signs (24h Range):  Temp:  [97 °F (36.1 °C)-98.6 °F (37 °C)] 97 °F (36.1 °C)  Pulse:  [68-80] 80  Resp:  [16-18] 18  SpO2:  [92 %-98 %] 95 %  BP: (120-153)/(58-75) 153/75     Weight: 73.5 kg (161 lb 15.9 oz)  Body mass index is 28.7 kg/m².    Intake/Output - Last 3 Shifts       01/21 0700 - 01/22 0659 01/22 0700 - 01/23 0659 01/23 0700 - 01/24 0659    P.O. 365 1200     I.V. (mL/kg)  50 (0.7)     NG/ 1291     Total Intake(mL/kg) 1125 (15.3) 2541 (34.6)     Urine (mL/kg/hr) 1350 (0.8) 0 (0)     Emesis/NG output 0 (0)      Drains 92 (0.1) 0 (0)     Other 0 (0)      Stool 0 (0) 0 (0)     Blood 0 (0)      Total Output 1442 0      Net -317 +2541             Urine Occurrence 4 x 9 x     Stool Occurrence 0 x 6 x     Emesis Occurrence 0 x          Physical Exam   Constitutional: She is oriented to person, place, and time. She appears well-developed and well-nourished. No distress.   HENT:   Head: Normocephalic and atraumatic.   Eyes: EOM are  normal. Pupils are equal, round, and reactive to light.   Neck: Normal range of motion. Neck supple.   Cardiovascular: Normal rate, regular rhythm and intact distal pulses.    Pulmonary/Chest: Effort normal. No respiratory distress.   Abdominal: Soft. She exhibits no distension. There is tenderness (at incision site).   Midline incision c/d/i. Jtube in place.   Musculoskeletal: Normal range of motion.   Neurological: She is alert and oriented to person, place, and time.   Skin: Skin is warm and dry. She is not diaphoretic.     Significant Labs:  CBC:     Recent Labs  Lab 01/22/18 0450   WBC 10.65   RBC 2.47*   HGB 7.2*   HCT 22.1*      MCV 90   MCH 29.1   MCHC 32.6     BMP:     Recent Labs  Lab 01/22/18 0450   *      K 3.0*      CO2 26   BUN 8   CREATININE 0.7   CALCIUM 8.0*   MG 1.8     CMP:     Recent Labs  Lab 01/22/18 0450   *   CALCIUM 8.0*   ALBUMIN 2.1*   PROT 4.8*      K 3.0*   CO2 26      BUN 8   CREATININE 0.7   ALKPHOS 89   ALT 15   AST 14   BILITOT 0.2     LFTs:     Recent Labs  Lab 01/22/18 0450   ALT 15   AST 14   ALKPHOS 89   BILITOT 0.2   PROT 4.8*   ALBUMIN 2.1*     Coagulation: No results for input(s): LABPROT, INR, APTT in the last 168 hours.      Assessment/Plan:     * Ampullary carcinoma    73 yo male s/p whipple, common bile duct exploration and j-tube placement.     -QOD labs   -replete lytes as needed  -pulm toilet- IS, duonebs   -PT/OT. Encourage ambulation and OOTBC  -Strict I/Os   -continue tube feeds tonight at 80cc from 8pto8a, currently at goal-Tolerating FLD, advance to regular diet today  -Hycet through Jtube, PRN IV morphine for breakthrough pain  -dvt ppx      Dispo: Plan to discharge home today with TF running at goal 80cc/hr and regular diet. Will discuss with staff prior to discharge           Niki James MD  General Surgery  [facility]

## 2018-01-23 NOTE — PLAN OF CARE
Problem: Patient Care Overview  Goal: Plan of Care Review  Outcome: Ongoing (interventions implemented as appropriate)  POC reviewed with patient and daughter. Pt AAOx4, VSS, Afebrile, SpO2> 92% on rm air. Pt complains of intermittent pain, PRN meds given as needed. ML with dermabond CDI. R JANET drain to bulb suction, output monitored, CDI. LUQ Gtube with gauze around site, CDI. TF began from 2000 to 0800 at 80 cc/hr, tolerating well. Pt tolerating a full liquid diet, no complaints of nausea/vomiting. Pt urinating adequately per toilet, output monitored. Pt up with standby assist, remains free from falls and injuries, will continue to monitor.

## 2018-01-24 NOTE — PLAN OF CARE
Patient discharged home 1/23/2018 with [facility], rolling walker and tube feeds with CPP/Bioscript.      Future Appointments  Date Time Provider Department Center   1/30/2018 11:30 AM Jeovany Rangel MD [facility]          01/24/18 0926   Final Note   Assessment Type Final Discharge Note   Discharge Disposition Home-Health   Hospital Follow Up  Appt(s) scheduled? Yes   Right Care Referral Info   Post Acute Recommendation Home-care

## 2018-01-30 ENCOUNTER — OFFICE VISIT (OUTPATIENT)
Dept: SURGERY | Facility: CLINIC | Age: 73
End: 2018-01-30
Payer: MEDICARE

## 2018-01-30 ENCOUNTER — TELEPHONE (OUTPATIENT)
Dept: HEMATOLOGY/ONCOLOGY | Facility: CLINIC | Age: 73
End: 2018-01-30

## 2018-01-30 VITALS
HEIGHT: 63 IN | BODY MASS INDEX: 28.79 KG/M2 | SYSTOLIC BLOOD PRESSURE: 107 MMHG | HEART RATE: 69 BPM | DIASTOLIC BLOOD PRESSURE: 62 MMHG | WEIGHT: 162.5 LBS | TEMPERATURE: 97 F

## 2018-01-30 DIAGNOSIS — C24.1 AMPULLARY CARCINOMA: Primary | ICD-10-CM

## 2018-01-30 PROCEDURE — 99999 PR PBB SHADOW E&M-EST. PATIENT-LVL III: CPT | Mod: PBBFAC,,, | Performed by: SURGERY

## 2018-01-30 PROCEDURE — 99024 POSTOP FOLLOW-UP VISIT: CPT | Mod: POP,,, | Performed by: SURGERY

## 2018-01-30 PROCEDURE — 99213 OFFICE O/P EST LOW 20 MIN: CPT | Mod: PBBFAC | Performed by: SURGERY

## 2018-01-30 NOTE — PROGRESS NOTES
"Vonnie Lees is a 72 y.o. female patient.   No diagnosis found.  Past Medical History:   Diagnosis Date    Anxiety     Arrhythmia     paroxysmal venticular bigeminy    Biliary obstruction     with severe ductal dilation    Elevated liver enzymes     Endometriosis     GERD (gastroesophageal reflux disease)     Hepatitis     as a child    HTN (hypertension)     IBS (irritable bowel syndrome)     TIA (transient ischemic attack)      No past surgical history pertinent negatives on file.  Scheduled Meds:  Continuous Infusions:  PRN Meds:    Review of patient's allergies indicates:   Allergen Reactions    Metronidazole Other (See Comments)     Other reaction(s): Other (See Comments)  Rash on face and bloating  Yeast infection    Aldoril d30 [methyldopa-hydrochlorothiazide]      Itching eyeball    Flagyl [metronidazole hcl] Other (See Comments)     Yeast infection.    Hydrochlorothiazide      Itching eyeball.    Keflex [cephalexin] Diarrhea    Zithromax [azithromycin] Diarrhea     There are no hospital problems to display for this patient.    Blood pressure 107/62, pulse 69, temperature 97.4 °F (36.3 °C), temperature source Oral, height 5' 3" (1.6 m), weight 73.7 kg (162 lb 7.7 oz).    Subjective   Vonnie Lees is a 72 y.o. female who presents to clinic post Whipple procedure and J tube placement on 1/12/18.  She has been doing well.  Ambulating around her house and up a few stairs in her house without much difficulty.  Feels stable on her feet.  She is now tolerating more oral intake.  She reports eating 3 meals per day and eating at least 3/4 of the meal each time.  Drinking plenty of water.  Urinating well and having good bowel function.  She had a lot of gas with tube feeds and has cut back from 3 cans to 2 cans with resolution of the gas.  Her only complaint today is leaking around the JANET drain, which is only putting out <5 cc per day at this point; she has to change the bandage around it twice " per day.  Otherwise no fever, chills, nausea, vomiting.      Objective   NAD  RRR  CTA b/l  Incision well healed; J tube in place to LUQ with no drainage around the tube site; JANET drain in place on RLQ; some serous fluid draining from around the tube with minimal fluid inside the tube.      Pathology:  1. COMMON HEPATIC LYMPH NODE (EXCISION):  No metastatic carcinoma in one lymph node (0/1)  2. PANCREAS, DUODENUM AND PYLORUS (WHIPPLE, NON-PYLORUS PRESERVING):  Invasive adenocarcinoma, moderately differentiated, of the ampulla  TNM stage summary (AJCC 8th ed): pT1b N1  See cancer synoptic report below    CANCER SYNOPTIC REPORT  Procedure: Pancreaticoduodenectomy (Whipple resection), non-pylorus sparing  Tumor Site: Intra-ampullary and lucie-ampullary (mixed type)  Tumor Size: 1.4 centimeter villous adenoma with largest focus of invasion 0.9 cm  Histologic Type: Adenocarcinoma  Histologic Grade: G2: Moderately differentiated  Tumor Extension: Tumor invades into duodenal submucosa  Margins  Pancreatic Neck Margin: Uninvolved by invasive carcinoma and pancreatic high-grade intraepithelial neoplasia  Uncinate (Retroperitoneal/Superior Mesenteric Artery) Margin: Uninvolved by invasive carcinoma  Bile Duct Margin: Uninvolved by invasive carcinoma and high-grade intraepithelial neoplasia  Proximal Margin (Gastric): Uninvolved by invasive carcinoma and high-grade dysplasia  Distal Margin (Small intestine): Uninvolved by invasive carcinoma and high-grade dysplasia  Lymphovascular Invasion: Present  Perineural Invasion: Not identified  Regional Lymph Nodes  Number of Lymph Nodes Involved: 1  Number of Lymph Nodes Examined: 15  Pathologic Stage Classification (pTNM, AJCC 8th Edition)  Primary Tumor (pT)  pT1b: Tumor invades beyond the sphincter of Oddi (perisphincteric invasion) and/or into the duodenal  submucosa  Regional Lymph Nodes (pN)  pN1: Metastasis to one to three regional lymph nodes  Distant Metastasis (pM): none  submitted       Assessment & Plan  -Path reviewed with patient; she will need adjuvant therapy for which we will refer her to oncologist at [facility]  -JANET drain removed; told patient to expect some drainage for 2-4 days as the drain site heals up  -Stop J tube feeds; weigh daily and restart if losing a few pounds  -RTC 2 weeks for follow up and to check weight; (162 lbs today)    Vincenzo Castillo Jr., MD  1/30/2018       Agree with note above by Dr. Castillo, pt interviewed, examined, chart, labs, vitals, films reviewed.  I have reviewed and edited the note, the assessment/plan is my own.    Doing well, remove drain.  Hold off TF, remove if weight stable in a couple of weeks.    1/12/2018 s/p Whipple for ampullary adenocarcinoma (pT1bN1 (1 of 16), M0)    Jeovany Rangel MD, FACS  Surgical Oncology  [facility] [place] [place]  Office: [phone]  Fax: [phone]

## 2018-02-02 DIAGNOSIS — C24.1 AMPULLARY CARCINOMA: Primary | ICD-10-CM

## 2018-02-14 ENCOUNTER — TELEPHONE (OUTPATIENT)
Dept: SURGERY | Facility: CLINIC | Age: 73
End: 2018-02-14

## 2018-02-14 LAB — FUNGUS SPEC CULT: NORMAL

## 2018-02-14 NOTE — TELEPHONE ENCOUNTER
----- Message from Ilene Rod sent at 2/14/2018 12:51 PM CST -----  Contact: Yasmin/ home health nurse  Yasmin States that she needs a call returned by a nurse in reference to discharging ben from occupational therapy , Please call Yasmin @ [phone] . Thanks :)

## 2018-02-15 ENCOUNTER — OFFICE VISIT (OUTPATIENT)
Dept: SURGERY | Facility: CLINIC | Age: 73
End: 2018-02-15
Payer: MEDICARE

## 2018-02-15 VITALS
SYSTOLIC BLOOD PRESSURE: 138 MMHG | DIASTOLIC BLOOD PRESSURE: 65 MMHG | TEMPERATURE: 97 F | BODY MASS INDEX: 27.39 KG/M2 | HEART RATE: 67 BPM | HEIGHT: 63 IN | WEIGHT: 154.56 LBS

## 2018-02-15 DIAGNOSIS — C24.1 AMPULLARY CARCINOMA: Primary | ICD-10-CM

## 2018-02-15 PROCEDURE — 99212 OFFICE O/P EST SF 10 MIN: CPT | Mod: PBBFAC | Performed by: SURGERY

## 2018-02-15 PROCEDURE — 99024 POSTOP FOLLOW-UP VISIT: CPT | Mod: POP,,, | Performed by: SURGERY

## 2018-02-15 PROCEDURE — 99999 PR PBB SHADOW E&M-EST. PATIENT-LVL II: CPT | Mod: PBBFAC,,, | Performed by: SURGERY

## 2018-02-15 NOTE — PROGRESS NOTES
"Vonnie Lees is a 72 y.o. female patient.   No diagnosis found.  Past Medical History:   Diagnosis Date    Anxiety     Arrhythmia     paroxysmal venticular bigeminy    Biliary obstruction     with severe ductal dilation    Elevated liver enzymes     Endometriosis     GERD (gastroesophageal reflux disease)     Hepatitis     as a child    HTN (hypertension)     IBS (irritable bowel syndrome)     TIA (transient ischemic attack)      No past surgical history pertinent negatives on file.  Scheduled Meds:  Continuous Infusions:  PRN Meds:    Review of patient's allergies indicates:   Allergen Reactions    Metronidazole Other (See Comments)     Other reaction(s): Other (See Comments)  Rash on face and bloating  Yeast infection    Aldoril d30 [methyldopa-hydrochlorothiazide]      Itching eyeball    Flagyl [metronidazole hcl] Other (See Comments)     Yeast infection.    Hydrochlorothiazide      Itching eyeball.    Keflex [cephalexin] Diarrhea    Zithromax [azithromycin] Diarrhea     There are no hospital problems to display for this patient.    Blood pressure 138/65, pulse 67, temperature 97.3 °F (36.3 °C), temperature source Oral, height 5' 3" (1.6 m), weight 70.1 kg (154 lb 8.7 oz).    Subjective   Vonnie Lees is a 72 y.o. female who presents s/p Whipple procedure and J tube placement on 1/12/18.  She has been doing excellent.  Ambulating without difficultly and has been release from PT/OT . She is eating well with a good appetite. No nausea or vomiting. She has not used her Jtube in several weeks. JANET drain was removed at last office appointment. Otherwise no fever, chills, fatigue, weakness nausea, vomiting, change in bowel movements, diarrhea and constipation.    Objective:  Vital signs (most recent): Blood pressure 138/65, pulse 67, temperature 97.3 °F (36.3 °C), temperature source Oral, height 5' 3" (1.6 m), weight 70.1 kg (154 lb 8.7 oz).     NAD  RRR  CTA b/l  Incision well healed; J tube in " place to LUQ with no drainage around the tube site; previous JANTE drain site well healed     Pathology:  1. COMMON HEPATIC LYMPH NODE (EXCISION):  No metastatic carcinoma in one lymph node (0/1)  2. PANCREAS, DUODENUM AND PYLORUS (WHIPPLE, NON-PYLORUS PRESERVING):  Invasive adenocarcinoma, moderately differentiated, of the ampulla  TNM stage summary (AJCC 8th ed): pT1b N1  See cancer synoptic report below    CANCER SYNOPTIC REPORT  Procedure: Pancreaticoduodenectomy (Whipple resection), non-pylorus sparing  Tumor Site: Intra-ampullary and lucie-ampullary (mixed type)  Tumor Size: 1.4 centimeter villous adenoma with largest focus of invasion 0.9 cm  Histologic Type: Adenocarcinoma  Histologic Grade: G2: Moderately differentiated  Tumor Extension: Tumor invades into duodenal submucosa  Margins  Pancreatic Neck Margin: Uninvolved by invasive carcinoma and pancreatic high-grade intraepithelial neoplasia  Uncinate (Retroperitoneal/Superior Mesenteric Artery) Margin: Uninvolved by invasive carcinoma  Bile Duct Margin: Uninvolved by invasive carcinoma and high-grade intraepithelial neoplasia  Proximal Margin (Gastric): Uninvolved by invasive carcinoma and high-grade dysplasia  Distal Margin (Small intestine): Uninvolved by invasive carcinoma and high-grade dysplasia  Lymphovascular Invasion: Present  Perineural Invasion: Not identified  Regional Lymph Nodes  Number of Lymph Nodes Involved: 1  Number of Lymph Nodes Examined: 15  Pathologic Stage Classification (pTNM, AJCC 8th Edition)  Primary Tumor (pT)  pT1b: Tumor invades beyond the sphincter of Oddi (perisphincteric invasion) and/or into the duodenal  submucosa  Regional Lymph Nodes (pN)  pN1: Metastasis to one to three regional lymph nodes  Distant Metastasis (pM): none submitted       Assessment & Plan  -Patient set up to see oncologist on Monday to start adjuvant chemotherapy   -Jtube removed today.   -RTC prn    Niki James MD  2/15/2018     Agree with note above by  Dr. James, pt interviewed, examined, chart, labs, vitals, films reviewed.  I have reviewed and edited the note, the assessment/plan is my own.      Jeovany Rangel MD, FACS  Surgical Oncology  [facility] [place], [place]  Office: [phone]  Fax: [phone]

## 2018-02-20 ENCOUNTER — TELEPHONE (OUTPATIENT)
Dept: SURGERY | Facility: CLINIC | Age: 73
End: 2018-02-20

## 2018-02-20 NOTE — TELEPHONE ENCOUNTER
----- Message from Ilene Rod sent at 2/20/2018  8:53 AM CST -----  Contact: daughter/   Olga  States that she needsa call returned by a nurse in reference to her mother seeing her an oncologist at the home city and she has some additional questions from that visit.                       Please call Olga @ [phone] . Thanks :)

## 2018-02-22 ENCOUNTER — TELEPHONE (OUTPATIENT)
Dept: SURGERY | Facility: CLINIC | Age: 73
End: 2018-02-22
